# Patient Record
Sex: MALE | Race: WHITE | NOT HISPANIC OR LATINO | Employment: OTHER | URBAN - METROPOLITAN AREA
[De-identification: names, ages, dates, MRNs, and addresses within clinical notes are randomized per-mention and may not be internally consistent; named-entity substitution may affect disease eponyms.]

---

## 2017-01-05 ENCOUNTER — PATIENT MESSAGE (OUTPATIENT)
Dept: FAMILY MEDICINE | Facility: CLINIC | Age: 62
End: 2017-01-05

## 2017-01-05 RX ORDER — CODEINE PHOSPHATE AND GUAIFENESIN 10; 100 MG/5ML; MG/5ML
5 SOLUTION ORAL EVERY 6 HOURS PRN
Qty: 240 ML | Refills: 0 | Status: SHIPPED | OUTPATIENT
Start: 2017-01-05 | End: 2017-01-15

## 2017-03-11 ENCOUNTER — OFFICE VISIT (OUTPATIENT)
Dept: FAMILY MEDICINE | Facility: CLINIC | Age: 62
End: 2017-03-11
Payer: COMMERCIAL

## 2017-03-11 VITALS
TEMPERATURE: 98 F | OXYGEN SATURATION: 98 % | DIASTOLIC BLOOD PRESSURE: 88 MMHG | SYSTOLIC BLOOD PRESSURE: 136 MMHG | HEART RATE: 79 BPM | HEIGHT: 72 IN | WEIGHT: 227.06 LBS | BODY MASS INDEX: 30.75 KG/M2

## 2017-03-11 DIAGNOSIS — J06.9 UPPER RESPIRATORY TRACT INFECTION, UNSPECIFIED TYPE: Primary | ICD-10-CM

## 2017-03-11 PROCEDURE — 99213 OFFICE O/P EST LOW 20 MIN: CPT | Mod: S$GLB,,, | Performed by: FAMILY MEDICINE

## 2017-03-11 PROCEDURE — 99999 PR PBB SHADOW E&M-EST. PATIENT-LVL III: CPT | Mod: PBBFAC,,, | Performed by: FAMILY MEDICINE

## 2017-03-11 PROCEDURE — 1160F RVW MEDS BY RX/DR IN RCRD: CPT | Mod: S$GLB,,, | Performed by: FAMILY MEDICINE

## 2017-03-11 RX ORDER — MONTELUKAST SODIUM 10 MG/1
10 TABLET ORAL NIGHTLY
Qty: 90 TABLET | Refills: 4 | Status: SHIPPED | OUTPATIENT
Start: 2017-03-11 | End: 2018-09-13 | Stop reason: SDUPTHER

## 2017-03-11 NOTE — PROGRESS NOTES
Blayne Mendoza presents with moderate upper respiratory congestion,rhinnorhea,moderate cough past 2-3 days. OTC help slightly. Denies nausea,vomiting,diarrhea or significant fever.    Past Medical History:   Diagnosis Date    Allergy     Cancer     Prostate    Colon polyp     Elevated PSA      Past Surgical History:   Procedure Laterality Date    COLONOSCOPY N/A 12/1/2015    Procedure: COLONOSCOPY;  Surgeon: Rod Rayo MD;  Location: [facility];  Service: Endoscopy;  Laterality: N/A;    PROSTATE SURGERY  2008    radical prostatectomy    VASECTOMY       Review of patient's allergies indicates:   Allergen Reactions    Penicillins      Other reaction(s): Swelling    Sulfa (sulfonamide antibiotics)      Other reaction(s): Rash       Social History     Social History    Marital status:      Spouse name: N/A    Number of children: N/A    Years of education: N/A     Occupational History    Not on file.     Social History Main Topics    Smoking status: Former Smoker     Packs/day: 0.00     Types: Cigars     Quit date: 11/30/1982    Smokeless tobacco: Not on file    Alcohol use 7.0 oz/week     14 Standard drinks or equivalent per week    Drug use: No    Sexual activity: Yes     Partners: Female     Other Topics Concern    Not on file     Social History Narrative     No family history on file.      ROS:  SKIN: No rashes, itching or changes in color or texture of skin.  EYES: Visual acuity fine. No photophobia, ocular pain or diplopia.EARS: Denies ear pain, discharge or vertigo.NOSE: No loss of smell, no epistaxis some postnasal drip.MOUTH & THROAT: No hoarseness or change in voice. No excessive gum bleeding.CHEST: Denies SHAW, cyanosis, wheezing  CARDIOVASCULAR: Denies chest pain, PND, orthopnea or reduced exercise tolerance.  ABDOMEN:  No weight loss.No abdominal pain, no hematemesis or blood in stool.  URINARY: No flank pain, dysuria or hematuria.  PERIPHERAL VASCULAR: No claudication or  cyanosis.  MUSCULOSKELETAL: Negative   NEUROLOGIC: No history of seizures, paralysis, alteration of gait or coordination.    PE: Vital signs as noted  Heent:Normocephalic with no recent cranial trauma,PERRLA,EOMI,conjunctiva clear,fundi reveal no hemmorhage exudate or papilledema.Otic canals clear, tympanic membranes slightly dull bilaterally.Nasal mucosa slightly red and edematous.Posterior pharynx slightly red but without exudate.  Neck:Supple with minimal anterior cervical adenopathy.  Chest:Clear bilateral breath sounds with mild scattered ronchi  Heart:Regular rhthym without murmer  Abdomen:Soft, non tender,no masses, no hepatosplenomegalyExtremeties and Neurologic:Grossly within normal limits  Impression: Upper Respiratory Infection. 465.9  Plan:   Claritin/ Zantac Singulair

## 2017-05-18 ENCOUNTER — PATIENT MESSAGE (OUTPATIENT)
Dept: FAMILY MEDICINE | Facility: CLINIC | Age: 62
End: 2017-05-18

## 2017-05-18 ENCOUNTER — OFFICE VISIT (OUTPATIENT)
Dept: FAMILY MEDICINE | Facility: CLINIC | Age: 62
End: 2017-05-18
Payer: COMMERCIAL

## 2017-05-18 VITALS
HEIGHT: 72 IN | DIASTOLIC BLOOD PRESSURE: 76 MMHG | WEIGHT: 225.5 LBS | SYSTOLIC BLOOD PRESSURE: 133 MMHG | BODY MASS INDEX: 30.54 KG/M2 | HEART RATE: 70 BPM

## 2017-05-18 DIAGNOSIS — L30.9 DERMATITIS: Primary | ICD-10-CM

## 2017-05-18 PROCEDURE — 99213 OFFICE O/P EST LOW 20 MIN: CPT | Mod: S$GLB,,, | Performed by: FAMILY MEDICINE

## 2017-05-18 PROCEDURE — 1160F RVW MEDS BY RX/DR IN RCRD: CPT | Mod: S$GLB,,, | Performed by: FAMILY MEDICINE

## 2017-05-18 PROCEDURE — 99999 PR PBB SHADOW E&M-EST. PATIENT-LVL II: CPT | Mod: PBBFAC,,, | Performed by: FAMILY MEDICINE

## 2017-05-18 NOTE — PROGRESS NOTES
The patient presents with tender painful scalp for the past 3 months  but no fever.  ROS:  General: No fever or sig wt change  HEENT:No other PND eye pain or dc  Respiratory: No cough wheezing  PE: vital signs noted  HEENT: Normocephalic,with no recent trauma,PERRLA,EOMI,conjunctiva injected with no exudate.Nasopharynx is injected and edematous.External otic canal edematous and injected TM dull  Neck:Supple without adenopathy  Chest:Clear bilateral breath sounds    Heart:Regular rhthym without murmer  Abdomen:Soft, non tender,no masses, no hepatosplenomegaly  Extremeties and Neurologic:Grossly within normal limits     Impression:Scalp dermititis   Plan: tOP Steroid shampoo

## 2017-07-31 ENCOUNTER — PATIENT MESSAGE (OUTPATIENT)
Dept: FAMILY MEDICINE | Facility: CLINIC | Age: 62
End: 2017-07-31

## 2017-09-13 RX ORDER — METHYLPREDNISOLONE 4 MG/1
TABLET ORAL
Qty: 1 PACKAGE | Refills: 0 | Status: SHIPPED | OUTPATIENT
Start: 2017-09-13 | End: 2017-10-04

## 2017-09-13 RX ORDER — AZITHROMYCIN 250 MG/1
TABLET, FILM COATED ORAL
Qty: 6 TABLET | Refills: 0 | Status: SHIPPED | OUTPATIENT
Start: 2017-09-13 | End: 2017-12-21

## 2017-12-21 ENCOUNTER — OFFICE VISIT (OUTPATIENT)
Dept: FAMILY MEDICINE | Facility: CLINIC | Age: 62
End: 2017-12-21
Payer: COMMERCIAL

## 2017-12-21 VITALS
TEMPERATURE: 98 F | HEIGHT: 72 IN | SYSTOLIC BLOOD PRESSURE: 128 MMHG | BODY MASS INDEX: 30.93 KG/M2 | HEART RATE: 73 BPM | DIASTOLIC BLOOD PRESSURE: 77 MMHG | WEIGHT: 228.38 LBS

## 2017-12-21 DIAGNOSIS — J06.9 UPPER RESPIRATORY TRACT INFECTION, UNSPECIFIED TYPE: Primary | ICD-10-CM

## 2017-12-21 PROCEDURE — 99213 OFFICE O/P EST LOW 20 MIN: CPT | Mod: 25,S$GLB,, | Performed by: FAMILY MEDICINE

## 2017-12-21 PROCEDURE — 96372 THER/PROPH/DIAG INJ SC/IM: CPT | Mod: S$GLB,,, | Performed by: FAMILY MEDICINE

## 2017-12-21 PROCEDURE — 99999 PR PBB SHADOW E&M-EST. PATIENT-LVL III: CPT | Mod: PBBFAC,,, | Performed by: FAMILY MEDICINE

## 2017-12-21 RX ORDER — TACROLIMUS 0.3 MG/G
OINTMENT TOPICAL
COMMUNITY
Start: 2017-10-31 | End: 2018-05-03

## 2017-12-21 RX ORDER — SELENIUM SULFIDE 2.5 MG/100ML
LOTION TOPICAL
COMMUNITY
Start: 2017-10-31 | End: 2021-07-26

## 2017-12-21 RX ORDER — DOXYCYCLINE 100 MG/1
CAPSULE ORAL
COMMUNITY
Start: 2017-10-31 | End: 2018-05-03

## 2017-12-21 RX ORDER — DEXAMETHASONE SODIUM PHOSPHATE 4 MG/ML
8 INJECTION, SOLUTION INTRA-ARTICULAR; INTRALESIONAL; INTRAMUSCULAR; INTRAVENOUS; SOFT TISSUE ONCE
Status: COMPLETED | OUTPATIENT
Start: 2017-12-21 | End: 2017-12-21

## 2017-12-21 RX ORDER — CODEINE PHOSPHATE AND GUAIFENESIN 10; 100 MG/5ML; MG/5ML
5 SOLUTION ORAL EVERY 6 HOURS PRN
Qty: 240 ML | Refills: 0 | Status: SHIPPED | OUTPATIENT
Start: 2017-12-21 | End: 2017-12-31

## 2017-12-21 RX ORDER — HYDROXYZINE HYDROCHLORIDE 25 MG/1
TABLET, FILM COATED ORAL
COMMUNITY
Start: 2017-11-04 | End: 2019-01-09 | Stop reason: SDUPTHER

## 2017-12-21 RX ADMIN — DEXAMETHASONE SODIUM PHOSPHATE 8 MG: 4 INJECTION, SOLUTION INTRA-ARTICULAR; INTRALESIONAL; INTRAMUSCULAR; INTRAVENOUS; SOFT TISSUE at 11:12

## 2017-12-21 NOTE — PROGRESS NOTES
Blayne Mendoza presents with moderate upper respiratory congestion,rhinnorhea,moderate cough past 2-3 days. OTC help slightly. Denies nausea,vomiting,diarrhea or significant fever.    Past Medical History:   Diagnosis Date    Allergy     Cancer     Prostate    Colon polyp     Elevated PSA      Past Surgical History:   Procedure Laterality Date    COLONOSCOPY N/A 12/1/2015    Procedure: COLONOSCOPY;  Surgeon: Rod Rayo MD;  Location: [facility];  Service: Endoscopy;  Laterality: N/A;    PROSTATE SURGERY  2008    radical prostatectomy    VASECTOMY       Review of patient's allergies indicates:   Allergen Reactions    Penicillins      Other reaction(s): Swelling    Sulfa (sulfonamide antibiotics)      Other reaction(s): Rash       Social History     Social History    Marital status:      Spouse name: N/A    Number of children: N/A    Years of education: N/A     Occupational History    Not on file.     Social History Main Topics    Smoking status: Former Smoker     Packs/day: 0.00     Types: Cigars     Quit date: 11/30/1982    Smokeless tobacco: Not on file    Alcohol use 7.0 oz/week     14 Standard drinks or equivalent per week    Drug use: No    Sexual activity: Yes     Partners: Female     Other Topics Concern    Not on file     Social History Narrative    No narrative on file     History reviewed. No pertinent family history.      ROS:  SKIN: No rashes, itching or changes in color or texture of skin.  EYES: Visual acuity fine. No photophobia, ocular pain or diplopia.EARS: Denies ear pain, discharge or vertigo.NOSE: No loss of smell, no epistaxis some postnasal drip.MOUTH & THROAT: No hoarseness or change in voice. No excessive gum bleeding.CHEST: Denies SHAW, cyanosis, wheezing  CARDIOVASCULAR: Denies chest pain, PND, orthopnea or reduced exercise tolerance.  ABDOMEN:  No weight loss.No abdominal pain, no hematemesis or blood in stool.  URINARY: No flank pain, dysuria or  hematuria.  PERIPHERAL VASCULAR: No claudication or cyanosis.  MUSCULOSKELETAL: Negative   NEUROLOGIC: No history of seizures, paralysis, alteration of gait or coordination.    PE: Vital signs as noted  Heent:Normocephalic with no recent cranial trauma,PERRLA,EOMI,conjunctiva clear,fundi reveal no hemmorhage exudate or papilledema.Otic canals clear, tympanic membranes slightly dull bilaterally.Nasal mucosa slightly red and edematous.Posterior pharynx slightly red but without exudate.  Neck:Supple with minimal anterior cervical adenopathy.  Chest:Clear bilateral breath sounds with mild scattered ronchi  Heart:Regular rhthym without murmer  Abdomen:Soft, non tender,no masses, no hepatosplenomegalyExtremeties and Neurologic:Grossly within normal limits  Impression: Upper Respiratory Infection. 465.9  Plan: Doxy   Dexa 8

## 2018-02-07 ENCOUNTER — PATIENT MESSAGE (OUTPATIENT)
Dept: FAMILY MEDICINE | Facility: CLINIC | Age: 63
End: 2018-02-07

## 2018-05-03 ENCOUNTER — OFFICE VISIT (OUTPATIENT)
Dept: FAMILY MEDICINE | Facility: CLINIC | Age: 63
End: 2018-05-03
Payer: COMMERCIAL

## 2018-05-03 VITALS
SYSTOLIC BLOOD PRESSURE: 118 MMHG | HEART RATE: 78 BPM | WEIGHT: 228 LBS | BODY MASS INDEX: 30.92 KG/M2 | DIASTOLIC BLOOD PRESSURE: 77 MMHG

## 2018-05-03 DIAGNOSIS — J06.9 UPPER RESPIRATORY TRACT INFECTION, UNSPECIFIED TYPE: Primary | ICD-10-CM

## 2018-05-03 DIAGNOSIS — H10.9 CONJUNCTIVITIS, UNSPECIFIED CONJUNCTIVITIS TYPE, UNSPECIFIED LATERALITY: ICD-10-CM

## 2018-05-03 DIAGNOSIS — S61.412A LACERATION OF LEFT HAND WITHOUT FOREIGN BODY, INITIAL ENCOUNTER: ICD-10-CM

## 2018-05-03 PROCEDURE — 99214 OFFICE O/P EST MOD 30 MIN: CPT | Mod: S$GLB,,, | Performed by: FAMILY MEDICINE

## 2018-05-03 PROCEDURE — 3008F BODY MASS INDEX DOCD: CPT | Mod: CPTII,S$GLB,, | Performed by: FAMILY MEDICINE

## 2018-05-03 PROCEDURE — 99999 PR PBB SHADOW E&M-EST. PATIENT-LVL II: CPT | Mod: PBBFAC,,, | Performed by: FAMILY MEDICINE

## 2018-05-03 RX ORDER — TOBRAMYCIN AND DEXAMETHASONE 3; 1 MG/ML; MG/ML
1 SUSPENSION/ DROPS OPHTHALMIC EVERY 6 HOURS
Qty: 5 ML | Refills: 1 | Status: SHIPPED | OUTPATIENT
Start: 2018-05-03 | End: 2018-05-10 | Stop reason: SDUPTHER

## 2018-05-03 NOTE — PROGRESS NOTES
Blayne Mendoza presents co lac finger 1h ago. Also with moderate eye redness and dc wo  upper respiratory congestion,rhinnorhea,moderate cough past 2-3 days Denies nausea,vomiting,diarrhea or significant fever.    Past Medical History:   Diagnosis Date    Allergy     Cancer     Prostate    Colon polyp     Elevated PSA      Past Surgical History:   Procedure Laterality Date    COLONOSCOPY N/A 12/1/2015    Procedure: COLONOSCOPY;  Surgeon: Rod Rayo MD;  Location: [facility];  Service: Endoscopy;  Laterality: N/A;    PROSTATE SURGERY  2008    radical prostatectomy    VASECTOMY       Review of patient's allergies indicates:   Allergen Reactions    Penicillins      Other reaction(s): Swelling    Sulfa (sulfonamide antibiotics)      Other reaction(s): Rash       Social History     Social History    Marital status:      Spouse name: N/A    Number of children: N/A    Years of education: N/A     Occupational History    Not on file.     Social History Main Topics    Smoking status: Former Smoker     Packs/day: 0.00     Types: Cigars     Quit date: 11/30/1982    Smokeless tobacco: Not on file    Alcohol use 7.0 oz/week     14 Standard drinks or equivalent per week    Drug use: No    Sexual activity: Yes     Partners: Female     Other Topics Concern    Not on file     Social History Narrative    No narrative on file     History reviewed. No pertinent family history.      ROS:  SKIN: No rashes, itching or changes in color or texture of skin.  EYES: Visual acuity fine. No photophobia, ocular pain or diplopia.EARS: Denies ear pain, discharge or vertigo.NOSE: No loss of smell, no epistaxis some postnasal drip.MOUTH & THROAT: No hoarseness or change in voice. No excessive gum bleeding.CHEST: Denies SHAW, cyanosis, wheezing  CARDIOVASCULAR: Denies chest pain, PND, orthopnea or reduced exercise tolerance.  ABDOMEN:  No weight loss.No abdominal pain, no hematemesis or blood in stool.  URINARY: No  flank pain, dysuria or hematuria.  PERIPHERAL VASCULAR: No claudication or cyanosis.  MUSCULOSKELETAL: Negative   NEUROLOGIC: No history of seizures, paralysis, alteration of gait or coordination.  1.5 cm lac dorsal hand prox to pip index repaired 4-0 ethilon p sterile prep   PE: Vital signs as noted  Heent:Normocephalic with no recent cranial trauma,PERRLA,EOMI,conjunctiva clear,fundi reveal no hemmorhage exudate or papilledema.Otic canals clear, tympanic membranes slightly dull bilaterally.Nasal mucosa slightly red and edematous.Posterior pharynx slightly red but without exudate.  Neck:Supple with minimal anterior cervical adenopathy.  Chest:Clear bilateral breath sounds with mild scattered ronchi  Heart:Regular rhthym without murmer  Abdomen:Soft, non tender,no masses, no hepatosplenomegalyExtremeties and Neurologic:Grossly within normal limits  Impression: Upper Respiratory Infection. 465.9  Laceration  Plan: Tobradex opth gtts   Wound care and sym fu

## 2018-05-10 RX ORDER — TOBRAMYCIN AND DEXAMETHASONE 3; 1 MG/ML; MG/ML
SUSPENSION/ DROPS OPHTHALMIC
Qty: 5 ML | Refills: 1 | Status: SHIPPED | OUTPATIENT
Start: 2018-05-10 | End: 2019-01-09

## 2018-06-27 ENCOUNTER — LAB VISIT (OUTPATIENT)
Dept: LAB | Facility: HOSPITAL | Age: 63
End: 2018-06-27
Attending: FAMILY MEDICINE
Payer: COMMERCIAL

## 2018-06-27 ENCOUNTER — PATIENT MESSAGE (OUTPATIENT)
Dept: FAMILY MEDICINE | Facility: CLINIC | Age: 63
End: 2018-06-27

## 2018-06-27 DIAGNOSIS — N39.0 UTI (URINARY TRACT INFECTION), UNCOMPLICATED: Primary | ICD-10-CM

## 2018-06-27 DIAGNOSIS — R30.0 DYSURIA: Primary | ICD-10-CM

## 2018-06-27 DIAGNOSIS — R30.0 DYSURIA: ICD-10-CM

## 2018-06-27 LAB
BACTERIA #/AREA URNS HPF: ABNORMAL /HPF
BILIRUB UR QL STRIP: NEGATIVE
CLARITY UR: CLEAR
COLOR UR: YELLOW
GLUCOSE UR QL STRIP: NEGATIVE
HGB UR QL STRIP: ABNORMAL
HYALINE CASTS #/AREA URNS LPF: 0 /LPF
KETONES UR QL STRIP: NEGATIVE
LEUKOCYTE ESTERASE UR QL STRIP: ABNORMAL
MICROSCOPIC COMMENT: ABNORMAL
NITRITE UR QL STRIP: POSITIVE
PH UR STRIP: 6 [PH] (ref 5–8)
PROT UR QL STRIP: ABNORMAL
RBC #/AREA URNS HPF: >100 /HPF (ref 0–4)
SP GR UR STRIP: 1.02 (ref 1–1.03)
SQUAMOUS #/AREA URNS HPF: ABNORMAL /HPF
URN SPEC COLLECT METH UR: ABNORMAL
WBC #/AREA URNS HPF: >100 /HPF (ref 0–5)
WBC CLUMPS URNS QL MICRO: ABNORMAL

## 2018-06-27 PROCEDURE — 81000 URINALYSIS NONAUTO W/SCOPE: CPT | Mod: PO

## 2018-06-27 RX ORDER — CIPROFLOXACIN 500 MG/1
500 TABLET ORAL 2 TIMES DAILY
Qty: 14 TABLET | Refills: 0 | Status: SHIPPED | OUTPATIENT
Start: 2018-06-27 | End: 2018-08-20

## 2018-07-23 ENCOUNTER — PATIENT MESSAGE (OUTPATIENT)
Dept: FAMILY MEDICINE | Facility: CLINIC | Age: 63
End: 2018-07-23

## 2018-07-23 ENCOUNTER — TELEPHONE (OUTPATIENT)
Dept: FAMILY MEDICINE | Facility: CLINIC | Age: 63
End: 2018-07-23

## 2018-07-23 DIAGNOSIS — J30.89 NON-SEASONAL ALLERGIC RHINITIS, UNSPECIFIED TRIGGER: ICD-10-CM

## 2018-07-23 DIAGNOSIS — H91.90 HEARING LOSS, UNSPECIFIED HEARING LOSS TYPE, UNSPECIFIED LATERALITY: Primary | ICD-10-CM

## 2018-07-23 DIAGNOSIS — N52.8 OTHER MALE ERECTILE DYSFUNCTION: Primary | ICD-10-CM

## 2018-07-25 ENCOUNTER — TELEPHONE (OUTPATIENT)
Dept: UROLOGY | Facility: CLINIC | Age: 63
End: 2018-07-25

## 2018-08-08 ENCOUNTER — PATIENT MESSAGE (OUTPATIENT)
Dept: FAMILY MEDICINE | Facility: CLINIC | Age: 63
End: 2018-08-08

## 2018-08-09 ENCOUNTER — CLINICAL SUPPORT (OUTPATIENT)
Dept: AUDIOLOGY | Facility: CLINIC | Age: 63
End: 2018-08-09
Payer: COMMERCIAL

## 2018-08-09 ENCOUNTER — OFFICE VISIT (OUTPATIENT)
Dept: OTOLARYNGOLOGY | Facility: CLINIC | Age: 63
End: 2018-08-09
Payer: COMMERCIAL

## 2018-08-09 VITALS — WEIGHT: 224.44 LBS | BODY MASS INDEX: 30.4 KG/M2 | HEIGHT: 72 IN

## 2018-08-09 DIAGNOSIS — H90.3 BILATERAL SENSORINEURAL HEARING LOSS: Primary | ICD-10-CM

## 2018-08-09 DIAGNOSIS — H90.3 SENSORINEURAL HEARING LOSS (SNHL), BILATERAL: ICD-10-CM

## 2018-08-09 DIAGNOSIS — H91.90 HEARING DIFFICULTY, UNSPECIFIED LATERALITY: Primary | ICD-10-CM

## 2018-08-09 DIAGNOSIS — H69.91 ETD (EUSTACHIAN TUBE DYSFUNCTION), RIGHT: Primary | ICD-10-CM

## 2018-08-09 PROCEDURE — 92557 COMPREHENSIVE HEARING TEST: CPT | Mod: S$GLB,,, | Performed by: AUDIOLOGIST-HEARING AID FITTER

## 2018-08-09 PROCEDURE — 99243 OFF/OP CNSLTJ NEW/EST LOW 30: CPT | Mod: S$GLB,,, | Performed by: OTOLARYNGOLOGY

## 2018-08-09 PROCEDURE — 99999 PR PBB SHADOW E&M-EST. PATIENT-LVL II: CPT | Mod: PBBFAC,,, | Performed by: OTOLARYNGOLOGY

## 2018-08-09 PROCEDURE — 99999 PR PBB SHADOW E&M-EST. PATIENT-LVL I: CPT | Mod: PBBFAC,,,

## 2018-08-09 PROCEDURE — 92567 TYMPANOMETRY: CPT | Mod: S$GLB,,, | Performed by: AUDIOLOGIST-HEARING AID FITTER

## 2018-08-09 NOTE — PROGRESS NOTES
Blaynemegan Mendoza was seen 08/09/2018 for an audiological evaluation. Patient complains of hearing loss. Pt reports a strong Hx of noise exposure with firearms and loud music. He denies tinnitus and family Hx of hearing loss. His family and friends are complaining that he can't hear well. He has noticed that his hearing is particularly bad in the presence of background noise. He actually stated that he does not participate in conversation at a restaurant because it is too difficult for him. He is friends with Dr Christopher Méndez who has told him he needs a hearing test and he also noticed in a recent meeting with Paolo Cordova and Rep Karlo Jose that he repeatedly asked for repetition.     Results reveal a normal through 750Hz sloping to profound sensorineural hearing loss for the right ear, and  Normal through 750Hz sloping to severe sensorineural hearing loss for the left ear.    Speech Reception Thresholds were  25 dBHL for the right ear and 20 dBHL for the left ear.  Pt had poor PTA/SRT agreement despite reinstruction.  Word recognition scores were good for the right ear and fair for the left ear.   Tympanograms were Type A for the right ear and Type A for the left ear.    Audiogram results were reviewed in detail with patient and all questions were answered. Results will be reviewed by ENT at the completion of this note. Recommend binaural amplification pending medical clearance, annual hearing tests to monitor hearing and hearing protection in loud noise. Pt is interested in hearing aids and will call the clinic when he is ready to set up a hearing aid consult.

## 2018-08-09 NOTE — LETTER
August 9, 2018      Christopher Méndez MD  [id number] [facility] [address]           [place] [facility]  [address] [facility] [address] [place] [address]  Phone: [phone]  Fax: [phone]          Patient: Blayne Mendoza   MR Number: [id number]   YOB: 1955   Date of Visit: 8/9/2018       Dear Aaareferral Self:    Thank you for referring Blayne Mendoza to me for evaluation. Attached you will find relevant portions of my assessment and plan of care.    If you have questions, please do not hesitate to call me. I look forward to following Blayne Mendoza along with you.    Sincerely,    Raj Coughlin MD    Enclosure  CC:  No Recipients    If you would like to receive this communication electronically, please contact [email or web address] or [phone] to request more information on [employer] access.    For providers and/or their staff who would like to refer a patient to [facility], please contact us through our one-stop-shop provider referral line, [facility], at [phone].    If you feel you have received this communication in error or would no longer like to receive these types of communications, please e-mail [email or web address]

## 2018-08-09 NOTE — PROGRESS NOTES
Subjective:       Patient ID: Blayne Mendoza is a 62 y.o. male.    Chief Complaint: Hearing Loss and Ear Fullness    Blayne is here for ear issues. Right sided symptoms more than left.   Two issues: hearing loss and aural fullness. He feels increasing aural fullness over time and issues with pressure in the ears which he believe cause some intermittent hearing issues. He does have history of allergies but have been reasonably controlled. Symptoms have been slowly progressive. He does notice that when he swallows of insufflates his ears, he has improvement in hearing.   He has trouble in background noise. He is a busy  and finds himself asking people to repeat themselves often.  Denies fam history of hearing loss, but he does have noise exposure history.       Previous surgery: no ear surgrery, no vertigo.     History   Smoking Status    Former Smoker    Packs/day: 0.00    Types: Cigars    Quit date: 11/30/1982   Smokeless Tobacco    Not on file     History   Alcohol Use    7.0 oz/week    14 Standard drinks or equivalent per week          Review of Systems   Constitutional: Negative for activity change and appetite change.   Eyes: Negative for discharge.   Respiratory: Negative for difficulty breathing and wheezing   Cardiovascular: Negative for chest pain.   Gastrointestinal: Negative for abdominal distention and abdominal pain.   Endocrine: Negative for cold intolerance and heat intolerance.   Genitourinary: Negative for dysuria.   Musculoskeletal: Negative for gait problem and joint swelling.   Skin: Negative for color change and pallor.   Neurological: Negative for syncope and weakness.   Psychiatric/Behavioral: Negative for agitation and confusion.     Objective:        Constitutional:   He is oriented to person, place, and time. He appears well-developed and well-nourished. He appears alert. He is active. Normal speech.      Head:  Normocephalic and atraumatic. Head is without TMJ  tenderness. No scars. Salivary glands normal.  Facial strength is normal.      Ears:    Right Ear: No drainage or swelling. No middle ear effusion.   Left Ear: No drainage or swelling.  No middle ear effusion.     Nose:  Mucosal edema and rhinorrhea (mucoid, thin) present. No sinus tenderness. Turbinate hypertrophy.      Mouth/Throat  Oropharynx clear and moist without lesions or asymmetry, normal uvula midline and mirror exam normal. Normal dentition. No uvula swelling, lacerations or trismus. No oropharyngeal exudate. Tonsillar erythema, tonsillar exudate.      Neck:  Full range of motion with neck supple and no adenopathy. Thyroid tenderness is present. No tracheal deviation, no edema, no erythema, normal range of motion, no stridor, no crepitus and no neck rigidity present. No thyroid mass present.     Cardiovascular:   Intact distal pulses and normal pulses.      Pulmonary/Chest:   Effort normal and breath sounds normal. No stridor.     Psychiatric:   His speech is normal and behavior is normal. His mood appears not anxious. His affect is not labile.     Neurological:   He is alert and oriented to person, place, and time. No sensory deficit.     Skin:   No abrasions, lacerations, lesions, or rashes. No abrasion and no bruising noted.         Tests / Results:  Normal sloping to severe SNHL  Fair to good WRS            Assessment:       1. ETD (Eustachian tube dysfunction), right    2. Sensorineural hearing loss (SNHL), bilateral          Plan:         Suspect hearing loss (SN) related to majority of hearing issues. He is a candidate for hearing aid. Discused limitations regarding his WRS but I think he would do well overall.  His tympanograms and exam are normal, but I supect ETD, right. Discussed medical treatment first (INS and oral AH) given his intermittent allergic issues. I doubt a tube would help but depending on how he does, we can discuss  FU 4-6 weeks.

## 2018-08-20 ENCOUNTER — OFFICE VISIT (OUTPATIENT)
Dept: UROLOGY | Facility: CLINIC | Age: 63
End: 2018-08-20
Payer: COMMERCIAL

## 2018-08-20 ENCOUNTER — PATIENT MESSAGE (OUTPATIENT)
Dept: UROLOGY | Facility: CLINIC | Age: 63
End: 2018-08-20

## 2018-08-20 VITALS
SYSTOLIC BLOOD PRESSURE: 158 MMHG | HEART RATE: 76 BPM | HEIGHT: 72 IN | DIASTOLIC BLOOD PRESSURE: 85 MMHG | WEIGHT: 218.25 LBS | BODY MASS INDEX: 29.56 KG/M2

## 2018-08-20 DIAGNOSIS — N52.31 ERECTILE DYSFUNCTION FOLLOWING RADICAL PROSTATECTOMY: Primary | ICD-10-CM

## 2018-08-20 DIAGNOSIS — C61 PROSTATE CANCER: ICD-10-CM

## 2018-08-20 PROBLEM — N13.8 BPH WITH URINARY OBSTRUCTION: Status: ACTIVE | Noted: 2018-08-20

## 2018-08-20 PROBLEM — N40.1 BPH WITH URINARY OBSTRUCTION: Status: RESOLVED | Noted: 2018-08-20 | Resolved: 2018-08-20

## 2018-08-20 PROBLEM — N52.01 ERECTILE DYSFUNCTION DUE TO ARTERIAL INSUFFICIENCY: Status: RESOLVED | Noted: 2018-08-20 | Resolved: 2018-08-20

## 2018-08-20 PROBLEM — N13.8 BPH WITH URINARY OBSTRUCTION: Status: RESOLVED | Noted: 2018-08-20 | Resolved: 2018-08-20

## 2018-08-20 PROBLEM — N40.1 BPH WITH URINARY OBSTRUCTION: Status: ACTIVE | Noted: 2018-08-20

## 2018-08-20 PROBLEM — N52.01 ERECTILE DYSFUNCTION DUE TO ARTERIAL INSUFFICIENCY: Status: ACTIVE | Noted: 2018-08-20

## 2018-08-20 PROCEDURE — 99204 OFFICE O/P NEW MOD 45 MIN: CPT | Mod: S$GLB,,, | Performed by: UROLOGY

## 2018-08-20 PROCEDURE — 99999 PR PBB SHADOW E&M-EST. PATIENT-LVL III: CPT | Mod: PBBFAC,,, | Performed by: UROLOGY

## 2018-08-20 PROCEDURE — 3008F BODY MASS INDEX DOCD: CPT | Mod: CPTII,S$GLB,, | Performed by: UROLOGY

## 2018-08-20 RX ORDER — SILDENAFIL CITRATE 20 MG/1
TABLET ORAL
Qty: 50 TABLET | Refills: 11 | Status: SHIPPED | OUTPATIENT
Start: 2018-08-20 | End: 2018-08-24 | Stop reason: SDUPTHER

## 2018-08-20 RX ORDER — PAPAVERINE HYDROCHLORIDE 30 MG/ML
INJECTION INTRAMUSCULAR; INTRAVENOUS
Qty: 5 ML | Refills: 0 | Status: SHIPPED | OUTPATIENT
Start: 2018-08-20 | End: 2018-08-24 | Stop reason: ALTCHOICE

## 2018-08-20 NOTE — PROGRESS NOTES
CHIEF COMPLAINT:    Mr. Mendoza is a 62 y.o. male presenting with ED.    PRESENTING ILLNESS:    Blayne Mendoza is a 62 y.o. male with a history of prostate cancer s/p RALP ~  by Dr. Torres.  He was able to have return of his erections.  However, he notes that > 6 months ago, he started having trouble with his erections.  After his RALP, he used ICI with good results.  He can't remember his dose.    He is continent.      No bone pain or weight loss.    REVIEW OF SYSTEMS:    Blayne Mendoza denies headache, blurred vision, fever, nausea, vomiting, chills, abdominal pain, bleeding per rectum, cough, SOB, recent loss of consciousness, recent mental status changes, seizures, dizziness, or upper or lower extremity weakness.    GILLIAN  1. 1  2. 2  3. 1  4. 2  5. 1      PATIENT HISTORY:    Past Medical History:   Diagnosis Date    Allergy     Cancer     Prostate    Colon polyp        Past Surgical History:   Procedure Laterality Date    PROSTATE SURGERY      radical prostatectomy    VASECTOMY         History reviewed. No pertinent family history.    Social History     Socioeconomic History    Marital status:      Spouse name: Not on file    Number of children: Not on file    Years of education: Not on file    Highest education level: Not on file   Social Needs    Financial resource strain: Not on file    Food insecurity - worry: Not on file    Food insecurity - inability: Not on file    Transportation needs - medical: Not on file    Transportation needs - non-medical: Not on file   Occupational History    Not on file   Tobacco Use    Smoking status: Former Smoker     Packs/day: 0.00     Types: Cigars     Last attempt to quit: 1982     Years since quittin.7    Smokeless tobacco: Never Used   Substance and Sexual Activity    Alcohol use: Yes     Alcohol/week: 7.0 oz     Types: 14 Standard drinks or equivalent per week    Drug use: No    Sexual activity: Yes     Partners: Female    Other Topics Concern    Not on file   Social History Narrative    Not on file       Allergies:  Penicillins and Sulfa (sulfonamide antibiotics)    Medications:    Current Outpatient Medications:     alprazolam (XANAX) 0.25 MG tablet, TAKE 1 TO 2 TABLETS BY MOUTH EVERY 4 HOURS AS NEEDED  MAY CAUSEDROWSINESS , Disp: 60 tablet, Rfl: 2    fluocinolone (SYNALAR) 0.01 % Sham, Apply topically once daily at 6am., Disp: 120 mL, Rfl: 3    hydrOXYzine HCl (ATARAX) 25 MG tablet, Take 1/2 to 1 tablet for itching as needed., Disp: , Rfl:     selenium sulfide 2.5 % Lotn, Shampoo lather 3-5 mins nightly for itching., Disp: , Rfl:     tobramycin-dexamethasone 0.3-0.1% (TOBRADEX) 0.3-0.1 % DrpS, INSTILL ONE DROP INTO BOTH EYES EVERY 6 HOURS SHAKE GENTLY BEFORE USE, Disp: 5 mL, Rfl: 1    PHYSICAL EXAMINATION:    The patient generally appears in good health, is appropriately interactive, and is in no apparent distress.     Eyes: anicteric sclerae, moist conjunctivae; no lid-lag; PERRLA     HENT: Atraumatic; oropharynx clear with moist mucous membranes and no mucosal ulcerations;normal hard and soft palate.  No evidence of lymphadenopathy.    Neck: Trachea midline.  No thyromegaly.    Musculoskeletal: No abnormal gait.    Skin: No lesions.    Mental: Cooperative with normal affect.  Is oriented to time, place, and person.    Neuro: Grossly intact.    Chest: Normal inspiratory effort.   No accessory muscles.  No audible wheezes.  Respirations symmetric on inspiration and expiration.    Heart: Regular rhythm.      Abdomen:  Soft, non-tender. No masses or organomegaly. Bladder is not palpable. No evidence of flank discomfort. No evidence of inguinal hernia.    Genitourinary: The penis is not circumcised with no evidence of plaques or induration. The urethral meatus is normal. The testes, epididymides, and cord structures are normal in size and contour bilaterally. The scrotum is normal in size and contour.    Extremities: No  clubbing, cyanosis, or edema      LABS:      Lab Results   Component Value Date    PSA 0.04 12/15/2015    PSA 0.02 12/09/2014    PSA 0.02 01/03/2013       IMPRESSION:    Encounter Diagnoses   Name Primary?    Erectile dysfunction following radical prostatectomy Yes    Prostate cancer          PLAN:    1. Discussed options for his ED.  He'd like to try sildenafil and ICI. Side effects discussed.  A new Rx was given.  Will set up for teaching.  2. Will check a PSA today.  3. RTC 3 months.    Copy to:

## 2018-08-20 NOTE — PATIENT INSTRUCTIONS
Penile Self-Injection Procedure  Self-injection is a good option for many men with erectile dysfunction (ED). A tiny needle is used to inject medication into the penis. This helps your penis get hard and stay that way long enough for sex. And sex and orgasm will feel as good as always. You may be nervous about doing self-injection at first. But with practice, it will get easier. Your healthcare provider will show you how to do self-injection the first time. The simple steps are outlined on this sheet.  Preparing for Injection  · Wash your hands well with soap and water.  · Prepare the medication (if needed).  · Sit or  a comfortable position in a warm, well-lit room. If you need to, sit or  front of a mirror.  · Find an injection site on one side of your penis, in a place with no visible veins. (Dont inject into the top, bottom, or head of the penis.)  · Clean the injection site with an alcohol swab. Grasp the head of your penis firmly with your thumb and forefinger (dont just pinch the skin). Stretch the penis so the skin on the shaft is taut.  Injecting the Medication  · Rest your penis against your inner thigh and pull it gently toward your knee. Dont twist or rotate it. This way youll be sure to inject the medication into the spot you chose and cleaned before.  · Hold the syringe between your thumb and fingers, like youre holding a pen. Rest your forearm on your thigh for support.  · Insert the needle at a 90-degree angle (perpendicular) to the shaft. Do this quickly to reduce discomfort. (The needle should go in easily. If it doesnt, stop right away.)  · Move your thumb to the plunger. Press down to inject the medication, counting to 5.  · Remove the needle and dispose of it safely.     The injection site can be in any part of the shaded area.     Insert the needle straight into the penis.    Gaining an Erection  · Apply pressure to the injection site for a few minutes. This prevents  swelling and bruising and helps spread the medication.   · Stand up. This may help your erection develop. Foreplay often helps, too.  · Your penis should become firm within 10-20 minutes. The erection will last long enough for sex, and maybe longer.  Call Your Doctor If You Have:   · An erection that lasts longer than 3-4  hours  · Bleeding or bruising  · Severe pain  · Scarring or curvature of the penis       © 4227-7464 [facility], [address] [place] [address], [place] [address]. All rights reserved. This information is not intended as a substitute for professional medical care. Always follow your healthcare professional's instructions.

## 2018-08-20 NOTE — LETTER
August 20, 2018      Christopher Méndez MD  [address] [facility] [address]           [facility] - Urology 4th Floor  [address]  [place] [address]  Phone: [phone]          Patient: Blayne Mendoza   MR Number: [id number]   YOB: 1955   Date of Visit: 8/20/2018       Dear Dr. Christopher Méndez:    Thank you for referring Blayne Mendoza to me for evaluation. Attached you will find relevant portions of my assessment and plan of care.    If you have questions, please do not hesitate to call me. I look forward to following Blayne Mendoza along with you.    Sincerely,    Kal Sprague MD    Enclosure  CC:  No Recipients    If you would like to receive this communication electronically, please contact [email or web address] or [phone] to request more information on [employer] Link access.    For providers and/or their staff who would like to refer a patient to [facility], please contact us through our one-stop-shop provider referral line, [facility], at [phone].    If you feel you have received this communication in error or would no longer like to receive these types of communications, please e-mail [email or web address]

## 2018-08-24 ENCOUNTER — PATIENT MESSAGE (OUTPATIENT)
Dept: UROLOGY | Facility: CLINIC | Age: 63
End: 2018-08-24

## 2018-08-24 ENCOUNTER — OFFICE VISIT (OUTPATIENT)
Dept: UROLOGY | Facility: CLINIC | Age: 63
End: 2018-08-24
Payer: COMMERCIAL

## 2018-08-24 VITALS
DIASTOLIC BLOOD PRESSURE: 71 MMHG | HEART RATE: 62 BPM | SYSTOLIC BLOOD PRESSURE: 137 MMHG | BODY MASS INDEX: 29.6 KG/M2 | WEIGHT: 218.25 LBS

## 2018-08-24 DIAGNOSIS — N52.31 ERECTILE DYSFUNCTION FOLLOWING RADICAL PROSTATECTOMY: ICD-10-CM

## 2018-08-24 DIAGNOSIS — C61 PROSTATE CANCER: Primary | ICD-10-CM

## 2018-08-24 DIAGNOSIS — Z90.79 HISTORY OF RADICAL PROSTATECTOMY: ICD-10-CM

## 2018-08-24 PROCEDURE — 3008F BODY MASS INDEX DOCD: CPT | Mod: CPTII,S$GLB,, | Performed by: NURSE PRACTITIONER

## 2018-08-24 PROCEDURE — 99999 PR PBB SHADOW E&M-EST. PATIENT-LVL III: CPT | Mod: PBBFAC,,, | Performed by: NURSE PRACTITIONER

## 2018-08-24 PROCEDURE — 99215 OFFICE O/P EST HI 40 MIN: CPT | Mod: S$GLB,,, | Performed by: NURSE PRACTITIONER

## 2018-08-24 RX ORDER — SILDENAFIL CITRATE 20 MG/1
TABLET ORAL
Qty: 90 TABLET | Refills: 11 | Status: ON HOLD | OUTPATIENT
Start: 2018-08-24 | End: 2020-06-25 | Stop reason: HOSPADM

## 2018-08-24 RX ORDER — PAPAVERINE HYDROCHLORIDE 30 MG/ML
INJECTION INTRAMUSCULAR; INTRAVENOUS
Qty: 10 ML | Refills: 11 | Status: SHIPPED | OUTPATIENT
Start: 2018-08-24 | End: 2021-07-26

## 2018-08-24 RX ORDER — SYRINGE,SAFETY WITH NEEDLE,1ML 25GX1"
SYRINGE (EA) MISCELLANEOUS
Qty: 10 EACH | Refills: 12 | Status: SHIPPED | OUTPATIENT
Start: 2018-08-24 | End: 2021-07-26

## 2018-08-24 NOTE — PROGRESS NOTES
Subjective:       Patient ID: Blayne Mendoza is a 62 y.o. male.    Chief Complaint: Erectile Dysfunction (PEP)    Blayne Mendoza is a 62 y.o. male with a history of prostate cancer s/p RALP ~  by Dr. Torres.    He was able to have return of his erections.  However, he notes that > 6 months ago, he started having trouble with his erections.    After his RALP, he used ICI with good results.  He can't remember his dose.  Testosterone is normal (508 ).    He was last seen in clinic with Dr. Sprague 2018.  He tried the Revatio but got headache and only slight reaction.    Here today for ICI education and 1st injection  He brought in his own medication                   PSA                  0.03                2018                 He is continent.       No bone pain or weight loss.        Past Medical History:  No date: Allergy  No date: Cancer      Comment:  Prostate  No date: Colon polyp    Past Surgical History:  2008: PROSTATE SURGERY      Comment:  radical prostatectomy  No date: VASECTOMY    History reviewed.  No pertinent family history.      Social History    Socioeconomic History      Marital status:       Spouse name: Not on file      Number of children: Not on file      Years of education: Not on file      Highest education level: Not on file    Social Needs      Financial resource strain: Not on file      Food insecurity - worry: Not on file      Food insecurity - inability: Not on file      Transportation needs - medical: Not on file      Transportation needs - non-medical: Not on file    Occupational History      Not on file    Tobacco Use      Smoking status: Former Smoker        Packs/day: 0.00        Types: Cigars        Quit date: 1982        Years since quittin.7      Smokeless tobacco: Never Used    Substance and Sexual Activity      Alcohol use: Yes        Alcohol/week: 7.0 oz        Types: 14 Standard drinks or equivalent per week      Drug use: No      Sexual  activity: Yes        Partners: Female    Other Topics      Concerns:        Not on file    Social History Narrative      Not on file      Allergies:  Penicillins and Sulfa (sulfonamide antibiotics)    Medications:  Current Outpatient Medications:   fluocinolone (SYNALAR) 0.01 % Sham, Apply topically once daily at 6am., Disp: 120 mL, Rfl: 3  hydrOXYzine HCl (ATARAX) 25 MG tablet, Take 1/2 to 1 tablet for itching as needed., Disp: , Rfl:   papaverine 30 mg/mL injection, Add Phentolamine 1 mg/cc Add PGE1 10 mcg/cc  SIG: Bring to office for test dose in physician office, Disp: 5 mL, Rfl: 0  selenium sulfide 2.5 % Lotn, Shampoo lather 3-5 mins nightly for itching., Disp: , Rfl:   sildenafil (REVATIO) 20 mg Tab, Take 5 tablets by mouth 1 hour before sexual activity, Disp: 50 tablet, Rfl: 11  tobramycin-dexamethasone 0.3-0.1% (TOBRADEX) 0.3-0.1 % DrpS, INSTILL ONE DROP INTO BOTH EYES EVERY 6 HOURS SHAKE GENTLY BEFORE USE, Disp: 5 mL, Rfl: 1        Review of Systems   Constitutional: Negative for activity change, appetite change, chills and fever.   HENT: Negative for facial swelling and trouble swallowing.    Eyes: Negative for visual disturbance.   Respiratory: Negative for chest tightness and shortness of breath.    Cardiovascular: Negative for chest pain and palpitations.   Gastrointestinal: Negative.  Negative for abdominal pain, constipation, diarrhea, nausea and vomiting.   Genitourinary: Negative for difficulty urinating, dysuria, flank pain, hematuria, penile pain, penile swelling, scrotal swelling and testicular pain.        Ok with urination;   Musculoskeletal: Negative for back pain, gait problem, myalgias and neck stiffness.   Skin: Negative for rash.   Neurological: Negative for dizziness and speech difficulty.   Hematological: Does not bruise/bleed easily.   Psychiatric/Behavioral: Negative for behavioral problems.       Objective:      Physical Exam   Nursing note and vitals reviewed.  Constitutional:  He is oriented to person, place, and time. Vital signs are normal. He appears well-developed and well-nourished. He is active and cooperative.  Non-toxic appearance. He does not have a sickly appearance.   HENT:   Head: Normocephalic and atraumatic.   Right Ear: External ear normal.   Left Ear: External ear normal.   Nose: Nose normal.   Mouth/Throat: Mucous membranes are normal.   Eyes: Conjunctivae and lids are normal. No scleral icterus.   Neck: Trachea normal, normal range of motion and full passive range of motion without pain. Neck supple. No JVD present. No tracheal deviation present.   Cardiovascular: Normal rate, S1 normal and S2 normal.    Pulmonary/Chest: Effort normal. No respiratory distress. He exhibits no tenderness.   Abdominal: Soft. Normal appearance and bowel sounds are normal. There is no hepatosplenomegaly. There is no tenderness. There is no CVA tenderness.   Genitourinary: Testes normal and penis normal. No penile tenderness.       Musculoskeletal: Normal range of motion.   Neurological: He is alert and oriented to person, place, and time. He has normal strength.   Skin: Skin is warm, dry and intact.     Psychiatric: He has a normal mood and affect. His behavior is normal. Judgment and thought content normal.       Assessment:       1. Prostate cancer    2. History of radical prostatectomy    3. Erectile dysfunction following radical prostatectomy        Plan:         I spent 60 minutes with the patient of which more than half was spent in direct consultation with the patient in regards to our treatment and plan.    Education and recommendations of today's plan of care including home remedies.  Refilled Revatio.    We discussed ED and the contributing factors. We reviewed his personal factors that contribute to ED. Patient was educated on ED treatments. We discussed PDE-5 inhibitors, SARAHI, Muse, and IPP.    He is here today for the Intracorporal injection/ICI education and first injection.   Educational materials were supplied.    ICI is an injectable medication that consists of three different medications to produce an erection. It is known as a Trimix Compound or PEP. The medication is a compound medication and is only mixed up at certain pharmacies known as a compound pharmacy. The medication has to be stored in the refrigerator. Improper storage decreases the effectiveness of the medication.     He was educated that it is an injection. With any injection there is risk for possible pain at the injection site as well as risk for an infection. Clean technique must be followed to help prevent infection. Proper needle disposable is necessary. He voices understanding.    The injection is best given when standing up and the penis is positioned perpendicular to the body. The urethral opening should be facing away from the body. Injection is always given on the lateral or side of the penis. Never give the injection to the top of the penis to avoid possible trauma to arteries and veins. Never give the injection to the bottom of the penis to avoid trauma to the urethra. He should alternate the injection sites to avoid the build up of scar tissue due to multiple injections.    This medication can increase the risk of erections lasting longer than 4 hours. This is known as a priapism and is a medical emergency. This requires immediate medical attention. This is main reason we give the first dose in the office today. We start at low dose and see how well the patients reacts. We can increase the medication if needed depending on the reaction the patient receives today. We discussed the fine line between enough medication for penetration and too much leading to a priapism. He voices understanding.    He witnessed me draw up 10 units (0.10ml) and he injected him in the right lateral aspect of the penis. Within ~10 minutes, he achieved an erection firm enough for intercourse. There was no discomfort, he was pleased  would like a little firmer. We discussed risks with medication. Sexual stimulation helps. Will give more time to see how he does.    He was instructed to keep the dosage at 15 units. If noticing a decrease in reaction he can increase the dosage by 5 units or 0.05ml. He may also refill the Rx.     If have any questions, please give me a call. Educational materials were given to patient and all questions were answered. He voiced understanding and left the office without a priapism.

## 2018-09-05 ENCOUNTER — PATIENT MESSAGE (OUTPATIENT)
Dept: OTOLARYNGOLOGY | Facility: CLINIC | Age: 63
End: 2018-09-05

## 2018-09-13 RX ORDER — MONTELUKAST SODIUM 10 MG/1
10 TABLET ORAL NIGHTLY
Qty: 90 TABLET | Refills: 4 | Status: SHIPPED | OUTPATIENT
Start: 2018-09-13 | End: 2019-10-16 | Stop reason: SDUPTHER

## 2018-09-17 ENCOUNTER — PATIENT MESSAGE (OUTPATIENT)
Dept: FAMILY MEDICINE | Facility: CLINIC | Age: 63
End: 2018-09-17

## 2018-10-01 ENCOUNTER — PATIENT MESSAGE (OUTPATIENT)
Dept: FAMILY MEDICINE | Facility: CLINIC | Age: 63
End: 2018-10-01

## 2018-10-03 ENCOUNTER — OFFICE VISIT (OUTPATIENT)
Dept: OTOLARYNGOLOGY | Facility: CLINIC | Age: 63
End: 2018-10-03
Payer: COMMERCIAL

## 2018-10-03 VITALS — WEIGHT: 215.38 LBS | HEIGHT: 72 IN | BODY MASS INDEX: 29.17 KG/M2

## 2018-10-03 DIAGNOSIS — H69.91 ETD (EUSTACHIAN TUBE DYSFUNCTION), RIGHT: Primary | ICD-10-CM

## 2018-10-03 DIAGNOSIS — J30.1 SEASONAL ALLERGIC RHINITIS DUE TO POLLEN: ICD-10-CM

## 2018-10-03 PROCEDURE — 99999 PR PBB SHADOW E&M-EST. PATIENT-LVL II: CPT | Mod: PBBFAC,,, | Performed by: OTOLARYNGOLOGY

## 2018-10-03 PROCEDURE — 99213 OFFICE O/P EST LOW 20 MIN: CPT | Mod: S$GLB,,, | Performed by: OTOLARYNGOLOGY

## 2018-10-03 PROCEDURE — 3008F BODY MASS INDEX DOCD: CPT | Mod: CPTII,S$GLB,, | Performed by: OTOLARYNGOLOGY

## 2018-10-03 NOTE — PROGRESS NOTES
Subjective:       Patient ID: Blayne Mendoza is a 63 y.o. male.    Chief Complaint: Follow-up and Fluid in ears    Blayne is here for follow-up of bilateral SNHL and subclinical ETD. He has been taking allergy medications with improvement. He recently traveled to a dry environment and felt much better.     He does have a history of AR and had IT as a child.     Review of Systems   Constitutional: Negative for activity change and appetite change.   Respiratory: Negative for difficulty breathing and wheezing   Cardiovascular: Negative for chest pain.      Objective:        Constitutional:   Vital signs are normal. He appears well-developed and well-nourished.     Head:  Normocephalic and atraumatic.     Ears:  Hearing normal to normal and whispered voice; external ear normal without scars, lesions, or masses; ear canal, tympanic membrane, and middle ear normal..     Nose:  Mucosal edema and rhinorrhea (mild) present. Turbinate hypertrophy.          Tests / Results:  None    Assessment:       1. ETD (Eustachian tube dysfunction), right    2. Seasonal allergic rhinitis due to pollen          Plan:       Ears symptomatically improved with allergy regimen.  Continue INS and oral AH and wean as needed to see when meds are needed (seasonal or perennial)  HA candidate. He is declining for now.  Fu 1 year with audio or sooner prn

## 2018-11-21 ENCOUNTER — PATIENT MESSAGE (OUTPATIENT)
Dept: UROLOGY | Facility: CLINIC | Age: 63
End: 2018-11-21

## 2018-12-20 ENCOUNTER — PATIENT MESSAGE (OUTPATIENT)
Dept: FAMILY MEDICINE | Facility: CLINIC | Age: 63
End: 2018-12-20

## 2018-12-20 ENCOUNTER — TELEPHONE (OUTPATIENT)
Dept: FAMILY MEDICINE | Facility: CLINIC | Age: 63
End: 2018-12-20

## 2018-12-20 DIAGNOSIS — C61 PROSTATE CANCER: ICD-10-CM

## 2018-12-20 DIAGNOSIS — Z00.00 ROUTINE HEALTH MAINTENANCE: Primary | ICD-10-CM

## 2019-01-09 ENCOUNTER — OFFICE VISIT (OUTPATIENT)
Dept: FAMILY MEDICINE | Facility: CLINIC | Age: 64
End: 2019-01-09
Payer: COMMERCIAL

## 2019-01-09 VITALS
HEIGHT: 72 IN | HEART RATE: 80 BPM | TEMPERATURE: 99 F | WEIGHT: 218.63 LBS | SYSTOLIC BLOOD PRESSURE: 133 MMHG | BODY MASS INDEX: 29.61 KG/M2 | DIASTOLIC BLOOD PRESSURE: 77 MMHG

## 2019-01-09 DIAGNOSIS — J30.89 NON-SEASONAL ALLERGIC RHINITIS, UNSPECIFIED TRIGGER: Primary | ICD-10-CM

## 2019-01-09 DIAGNOSIS — H69.90 DYSFUNCTION OF EUSTACHIAN TUBE, UNSPECIFIED LATERALITY: ICD-10-CM

## 2019-01-09 DIAGNOSIS — L73.9 FOLLICULITIS: ICD-10-CM

## 2019-01-09 DIAGNOSIS — H91.90 HEARING LOSS, UNSPECIFIED HEARING LOSS TYPE, UNSPECIFIED LATERALITY: ICD-10-CM

## 2019-01-09 PROCEDURE — 3008F BODY MASS INDEX DOCD: CPT | Mod: CPTII,S$GLB,, | Performed by: FAMILY MEDICINE

## 2019-01-09 PROCEDURE — 99214 OFFICE O/P EST MOD 30 MIN: CPT | Mod: S$GLB,,, | Performed by: FAMILY MEDICINE

## 2019-01-09 PROCEDURE — 99999 PR PBB SHADOW E&M-EST. PATIENT-LVL III: CPT | Mod: PBBFAC,,, | Performed by: FAMILY MEDICINE

## 2019-01-09 PROCEDURE — 3008F PR BODY MASS INDEX (BMI) DOCUMENTED: ICD-10-PCS | Mod: CPTII,S$GLB,, | Performed by: FAMILY MEDICINE

## 2019-01-09 PROCEDURE — 99214 PR OFFICE/OUTPT VISIT, EST, LEVL IV, 30-39 MIN: ICD-10-PCS | Mod: S$GLB,,, | Performed by: FAMILY MEDICINE

## 2019-01-09 PROCEDURE — 99999 PR PBB SHADOW E&M-EST. PATIENT-LVL III: ICD-10-PCS | Mod: PBBFAC,,, | Performed by: FAMILY MEDICINE

## 2019-01-09 RX ORDER — MONTELUKAST SODIUM 10 MG/1
10 TABLET ORAL NIGHTLY
Status: ON HOLD | COMMUNITY
End: 2020-06-25 | Stop reason: HOSPADM

## 2019-01-09 RX ORDER — LORATADINE 10 MG/1
10 TABLET ORAL DAILY PRN
COMMUNITY
End: 2021-07-28

## 2019-01-09 RX ORDER — DOXYCYCLINE 100 MG/1
100 CAPSULE ORAL DAILY
Qty: 30 CAPSULE | Refills: 3 | Status: SHIPPED | OUTPATIENT
Start: 2019-01-09 | End: 2019-03-14

## 2019-01-09 RX ORDER — HYDROXYZINE HYDROCHLORIDE 25 MG/1
TABLET, FILM COATED ORAL
Qty: 60 TABLET | Refills: 1 | Status: SHIPPED | OUTPATIENT
Start: 2019-01-09 | End: 2020-04-21 | Stop reason: SDUPTHER

## 2019-01-09 NOTE — PROGRESS NOTES
Blayne Mendoza presents with moderate AR w rhinnorhea and pressure [place] rt ear. ENT eval noted. Has been on H1H2 and montelukast wo control. Hd desensitization as a child.   Also recurrent folliculitis back, prev partial response to topical AB     Past Medical History:   Diagnosis Date    Allergy     Cancer     Prostate    Colon polyp      Past Surgical History:   Procedure Laterality Date    COLONOSCOPY N/A 12/1/2015    [facility]    PROSTATE SURGERY  2008    radical prostatectomy    VASECTOMY       Review of patient's allergies indicates:   Allergen Reactions    Penicillins      Other reaction(s): Swelling    Sulfa (sulfonamide antibiotics)      Other reaction(s): Rash    Bactrim  [sulfamethoxazole-trimethoprim] Rash     Current Outpatient Medications on File Prior to Visit   Medication Sig Dispense Refill    fluocinolone (SYNALAR) 0.01 % Sham Apply topically once daily at 6am. 120 mL 3    hydrOXYzine HCl (ATARAX) 25 MG tablet Take 1/2 to 1 tablet for itching as needed.      insulin syringe-needle U-100 1 mL 31 gauge x 5/16 Syrg Use as directed with ICI Therapy 10 each 12    loratadine (CLARITIN) 10 mg tablet Take 10 mg by mouth once daily.      montelukast (SINGULAIR) 10 mg tablet Take 10 mg by mouth every evening.      papaverine 30 mg/mL injection Add: Phentolamine 10 mg  Add: PGE1 100 mcg    Sig:  Inject 15 units (0.15 mls) as directed 10 mL 11    ranitidine (ZANTAC) 300 MG tablet TAKE ONE TABLET BY MOUTH EVERY EVENING 90 tablet 4    selenium sulfide 2.5 % Lotn Shampoo lather 3-5 mins nightly for itching.      sildenafil (REVATIO) 20 mg Tab Take 5 tablets by mouth 1 hour before sexual activity 90 tablet 11    [DISCONTINUED] tobramycin-dexamethasone 0.3-0.1% (TOBRADEX) 0.3-0.1 % DrpS INSTILL ONE DROP INTO BOTH EYES EVERY 6 HOURS SHAKE GENTLY BEFORE USE 5 mL 1     No current facility-administered medications on file prior to visit.      Social History      Socioeconomic History    Marital status:      Spouse name: Not on file    Number of children: Not on file    Years of education: Not on file    Highest education level: Not on file   Social Needs    Financial resource strain: Not on file    Food insecurity - worry: Not on file    Food insecurity - inability: Not on file    Transportation needs - medical: Not on file    Transportation needs - non-medical: Not on file   Occupational History    Not on file   Tobacco Use    Smoking status: Former Smoker     Packs/day: 0.00     Types: Cigars     Last attempt to quit: 1982     Years since quittin.1    Smokeless tobacco: Never Used   Substance and Sexual Activity    Alcohol use: Yes     Alcohol/week: 7.0 oz     Types: 14 Standard drinks or equivalent per week    Drug use: No    Sexual activity: Yes     Partners: Female   Other Topics Concern    Not on file   Social History Narrative    Not on file     History reviewed. No pertinent family history.      ROS:  SKIN: No rashes, itching or changes in color or texture of skin.  EYES: Visual acuity fine. No photophobia, ocular pain or diplopia.EARS: Denies ear pain, discharge or vertigo.NOSE: No loss of smell, no epistaxis some postnasal drip.MOUTH & THROAT: No hoarseness or change in voice. No excessive gum bleeding.CHEST: Denies SHAW, cyanosis, wheezing  CARDIOVASCULAR: Denies chest pain, PND, orthopnea or reduced exercise tolerance.  ABDOMEN:  No weight loss.No abdominal pain, no hematemesis or blood in stool.  URINARY: No flank pain, dysuria or hematuria.  PERIPHERAL VASCULAR: No claudication or cyanosis.  MUSCULOSKELETAL: Negative   NEUROLOGIC: No history of seizures, paralysis, alteration of gait or coordination.    PE: Vital signs as noted  Heent:Normocephalic with no recent cranial trauma,PERRLA,EOMI,conjunctiva clear,fundi reveal no hemmorhage exudate or papilledema.Otic canals clear, tympanic membranes slightly dull  bilaterally.Nasal mucosa slightly red and edematous.Posterior pharynx slightly red but without exudate.  Neck:Supple with minimal anterior cervical adenopathy.  Chest:Clear bilateral breath sounds   Heart:Regular rhthym without murmer  Abdomen:Soft, non tender,no masses, no hepatosplenomegalyExtremeties and Neurologic:Grossly within normal limits  Skin: Gen papular/pustular eruptions back.   Impression:AR  ETD  Hearing loss  Folliculitis  Plan: Short trial afrin to see if hearing improves if ETD better  Allergy con  Doxy 100 for folliculitis  Hydroxyzine hs prn itching

## 2019-01-21 ENCOUNTER — PATIENT OUTREACH (OUTPATIENT)
Dept: ADMINISTRATIVE | Facility: HOSPITAL | Age: 64
End: 2019-01-21

## 2019-01-21 DIAGNOSIS — Z11.59 NEED FOR HEPATITIS C SCREENING TEST: Primary | ICD-10-CM

## 2019-01-28 ENCOUNTER — OFFICE VISIT (OUTPATIENT)
Dept: ALLERGY | Facility: CLINIC | Age: 64
End: 2019-01-28
Payer: COMMERCIAL

## 2019-01-28 ENCOUNTER — LAB VISIT (OUTPATIENT)
Dept: LAB | Facility: HOSPITAL | Age: 64
End: 2019-01-28
Attending: ALLERGY & IMMUNOLOGY
Payer: COMMERCIAL

## 2019-01-28 VITALS
HEART RATE: 74 BPM | BODY MASS INDEX: 29.77 KG/M2 | WEIGHT: 219.81 LBS | TEMPERATURE: 97 F | HEIGHT: 72 IN | RESPIRATION RATE: 15 BRPM

## 2019-01-28 DIAGNOSIS — J30.9 ALLERGIC RHINITIS, UNSPECIFIED SEASONALITY, UNSPECIFIED TRIGGER: ICD-10-CM

## 2019-01-28 DIAGNOSIS — L29.9 ITCHING: ICD-10-CM

## 2019-01-28 DIAGNOSIS — H69.91 DYSFUNCTION OF RIGHT EUSTACHIAN TUBE: ICD-10-CM

## 2019-01-28 DIAGNOSIS — C61 PROSTATE CANCER: ICD-10-CM

## 2019-01-28 DIAGNOSIS — R05.9 COUGH: ICD-10-CM

## 2019-01-28 DIAGNOSIS — L30.9 DERMATITIS: Primary | ICD-10-CM

## 2019-01-28 DIAGNOSIS — L71.9 ROSACEA: ICD-10-CM

## 2019-01-28 DIAGNOSIS — L30.9 DERMATITIS: ICD-10-CM

## 2019-01-28 LAB
IGA SERPL-MCNC: 150 MG/DL
IGE SERPL-ACNC: 75 IU/ML
IGG SERPL-MCNC: 1092 MG/DL
IGM SERPL-MCNC: 103 MG/DL

## 2019-01-28 PROCEDURE — 86162 COMPLEMENT TOTAL (CH50): CPT

## 2019-01-28 PROCEDURE — 99999 PR PBB SHADOW E&M-EST. PATIENT-LVL III: ICD-10-PCS | Mod: PBBFAC,,, | Performed by: ALLERGY & IMMUNOLOGY

## 2019-01-28 PROCEDURE — 3008F PR BODY MASS INDEX (BMI) DOCUMENTED: ICD-10-PCS | Mod: CPTII,S$GLB,, | Performed by: ALLERGY & IMMUNOLOGY

## 2019-01-28 PROCEDURE — 86003 ALLG SPEC IGE CRUDE XTRC EA: CPT

## 2019-01-28 PROCEDURE — 86774 TETANUS ANTIBODY: CPT

## 2019-01-28 PROCEDURE — 36415 COLL VENOUS BLD VENIPUNCTURE: CPT

## 2019-01-28 PROCEDURE — 99205 OFFICE O/P NEW HI 60 MIN: CPT | Mod: S$GLB,,, | Performed by: ALLERGY & IMMUNOLOGY

## 2019-01-28 PROCEDURE — 82787 IGG 1 2 3 OR 4 EACH: CPT

## 2019-01-28 PROCEDURE — 99205 PR OFFICE/OUTPT VISIT, NEW, LEVL V, 60-74 MIN: ICD-10-PCS | Mod: S$GLB,,, | Performed by: ALLERGY & IMMUNOLOGY

## 2019-01-28 PROCEDURE — 82784 ASSAY IGA/IGD/IGG/IGM EACH: CPT

## 2019-01-28 PROCEDURE — 99999 PR PBB SHADOW E&M-EST. PATIENT-LVL III: CPT | Mod: PBBFAC,,, | Performed by: ALLERGY & IMMUNOLOGY

## 2019-01-28 PROCEDURE — 3008F BODY MASS INDEX DOCD: CPT | Mod: CPTII,S$GLB,, | Performed by: ALLERGY & IMMUNOLOGY

## 2019-01-28 PROCEDURE — 82785 ASSAY OF IGE: CPT

## 2019-01-28 PROCEDURE — 86003 ALLG SPEC IGE CRUDE XTRC EA: CPT | Mod: 59

## 2019-01-28 NOTE — PROGRESS NOTES
Chief complaint:  Concerned about possible allergy, skin inflammation, and immune system    This note was dictated using voice recognition software and may contain errors.    History:    He had a 10:00 a.m. appointment on Monday January 28, 2019.    Information in his medical record regarding his past medical history family history and social history was reviewed and updated today.  Significant addition see if any are as noted below.    He has a number of health concerns.  He is concerned regarding possible respiratory allergy.  He has a history of nasal symptoms dating back to his childhood.  He stated probably around age 5 or 7 an allergy evaluation was performed.  He received allergy injections for 10 years.  In recent years he has been aware of the development of respiratory infections typically occurring in September in April.  He is concerned about a possible allergic contribution to some of his symptoms.    In the past his dermatologist diagnosed him with rosacea.  He stated he develops inflammatory skin lesions in his scalp and also on the skin of the back.    He experiences itching of the skin of the scrotum without the development of any dermatitis.    He stated in 2017 he had significant respiratory symptoms in developed pneumonia.    He has no history of osteomyelitis or meningitis.    Family history is positive for rhinitis.  He stated his brother received immunotherapy injections.    Social history:  He works for [employer] and has done so for the past 42 years.  He indoor room unit air filters, purifier hours, or ionizers.    Review of systems:  See above.  At this time he is taking antihistamines on a daily basis and also Singulair on a daily basis.  Upon occasion he experiences a sensation of his right ear feeling stopped.    Exam:    In general he is in no distress.  He is alert oriented well-developed in good mood and attentive  Gait steady  Skin small areas of erythema approximately 0.5  cm to 1 cm circular her oval in shape are present on the skin of the back.  No urticaria is noted inspection of the skin of the inguinal areas did not reveal inflammation or changes suggestive of a dermatophyte  Head no swelling noted  Eyes scleral white conjunctiva pink  Nose patent no polyp seen  Mouth no inflammation or swelling of the lips tongue or in the throat noted  Ears not inflamed tympanic membranes not inflamed  Neck no masses or thyromegaly noted  Lymph nodes no significant cervical or epitrochlear lymphadenopathy noted  Heart regular rhythm no murmurs heard  Lungs clear to auscultation  Extremities no swelling or inflammation of the hands legs noted    Impression:    1.  Rosacea  2.  Dermatitis of the skin of the back, exact cause uncertain  3.  Itching  4.  Chronic rhinitis, history of allergic rhinitis  5.  Prostate cancer status post treatment  6.  Other health concerns as noted in his medical record  7.  Eustachian tube dysfunction    Assessment and plan:    His appointment was 60 minutes in duration spent entirely in face-to-face contact.  More than 50% of the visit was spent in counseling and coordination of care.    He is taking oral antihistamines at this time.  I did not recommend immediate hypersensitivity skin testing be performed in view of his use of oral antihistamines.  I did suggest that blood be drawn for measurement of IgE directed against selected allergens and also for the performance of quantitative immunoglobulins and antigen specific antibody levels.  A total complement level will be measured.  When the results of the diagnostic tests are available to review with him I will do so.  Additional recommendations may be made at that time.    I recommended that he continue to take medications as directed.    I suggested that he consider using topical Hibiclens on the skin of his back.  I emphasized the importance of being certain that the Hibiclens, when used, be allowed to remain in  contact with his skin for at least 3 minutes prior to washing off the topical Hibiclens.  I discussed with him the rationale for using this topical product for treatment of the dermatitis of his back.    In view of his history of right-sided sensation of his ear being stopped up, in addition to possible eustachian tube dysfunction, I told him it would be important not to overlook pathology of the right temporomandibular joint.    He was given the office phone number.  Should he have additional questions or concerns she was instructed to call.

## 2019-01-28 NOTE — LETTER
January 28, 2019      Christopher Méndez MD  [address] [facility] [address]           [facility] Allergy/ Immunology  [address] [facility] [address]  Phone: [phone]  Fax: [phone]          Patient: Blayne Mendoza   MR Number: [id number]   YOB: 1955   Date of Visit: 1/28/2019       Dear :    Thank you for referring Blayne Mendoza to me for evaluation. Attached you will find relevant portions of my assessment and plan of care.    If you have questions, please do not hesitate to call me. I look forward to following Blayne Mendoza along with you.    Sincerely,    Josep Gregory MD    Enclosure  CC:  No Recipients    If you would like to receive this communication electronically, please contact [email or web address] or [phone] to request more information on [employer] Link access.    For providers and/or their staff who would like to refer a patient to [facility], please contact us through our one-stop-shop provider referral line, [facility], at [phone].    If you feel you have received this communication in error or would no longer like to receive these types of communications, please e-mail [email or web address]

## 2019-01-29 ENCOUNTER — PATIENT MESSAGE (OUTPATIENT)
Dept: ALLERGY | Facility: CLINIC | Age: 64
End: 2019-01-29

## 2019-01-31 LAB
A ALTERNATA IGE QN: <0.35 KU/L
A FUMIGATUS IGE QN: <0.35 KU/L
BAHIA GRASS IGE QN: <0.35 KU/L
BALD CYPRESS IGE QN: <0.35 KU/L
BERMUDA GRASS IGE QN: <0.35 KU/L
C HERBARUM IGE QN: <0.35 KU/L
COMMON RAGWEED IGE QN: <0.35 KU/L
D FARINAE IGE QN: 0.61 KU/L
D PTERONYSS IGE QN: 0.54 KU/L
DEPRECATED A ALTERNATA IGE RAST QL: NORMAL
DEPRECATED A FUMIGATUS IGE RAST QL: NORMAL
DEPRECATED BAHIA GRASS IGE RAST QL: NORMAL
DEPRECATED BALD CYPRESS IGE RAST QL: NORMAL
DEPRECATED BERMUDA GRASS IGE RAST QL: NORMAL
DEPRECATED C HERBARUM IGE RAST QL: NORMAL
DEPRECATED COMMON RAGWEED IGE RAST QL: NORMAL
DEPRECATED D FARINAE IGE RAST QL: ABNORMAL
DEPRECATED D PTERONYSS IGE RAST QL: ABNORMAL
DEPRECATED DOG DANDER IGE RAST QL: NORMAL
DEPRECATED ELDER IGE RAST QL: NORMAL
DEPRECATED PECAN/HICK TREE IGE RAST QL: NORMAL
DEPRECATED TIMOTHY IGE RAST QL: NORMAL
DEPRECATED WHITE OAK IGE RAST QL: NORMAL
DEPRECATED WILLOW IGE RAST QL: NORMAL
DOG DANDER IGE QN: <0.35 KU/L
ELDER IGE QN: <0.35 KU/L
IGG1 SER-MCNC: 482 MG/DL
IGG2 SER-MCNC: 524 MG/DL
IGG3 SER-MCNC: 58 MG/DL
IGG4 SER-MCNC: 31 MG/DL
PECAN/HICK TREE IGE QN: <0.35 KU/L
TIMOTHY IGE QN: <0.35 KU/L
WHITE OAK IGE QN: <0.35 KU/L
WILLOW IGE QN: <0.35 KU/L

## 2019-02-01 ENCOUNTER — LAB VISIT (OUTPATIENT)
Dept: LAB | Facility: HOSPITAL | Age: 64
End: 2019-02-01
Attending: FAMILY MEDICINE
Payer: COMMERCIAL

## 2019-02-01 DIAGNOSIS — Z00.00 ROUTINE HEALTH MAINTENANCE: ICD-10-CM

## 2019-02-01 DIAGNOSIS — C61 PROSTATE CANCER: ICD-10-CM

## 2019-02-01 DIAGNOSIS — Z11.59 NEED FOR HEPATITIS C SCREENING TEST: ICD-10-CM

## 2019-02-01 LAB
ALBUMIN SERPL BCP-MCNC: 4 G/DL
ALP SERPL-CCNC: 81 U/L
ALT SERPL W/O P-5'-P-CCNC: 22 U/L
ANION GAP SERPL CALC-SCNC: 6 MMOL/L
AST SERPL-CCNC: 18 U/L
BASOPHILS # BLD AUTO: 0.04 K/UL
BASOPHILS NFR BLD: 0.7 %
BILIRUB SERPL-MCNC: 0.5 MG/DL
BUN SERPL-MCNC: 20 MG/DL
CALCIUM SERPL-MCNC: 10.2 MG/DL
CH50 SERPL-ACNC: 74 U/ML
CHLORIDE SERPL-SCNC: 106 MMOL/L
CHOLEST SERPL-MCNC: 187 MG/DL
CHOLEST/HDLC SERPL: 4.2 {RATIO}
CO2 SERPL-SCNC: 30 MMOL/L
CREAT SERPL-MCNC: 0.9 MG/DL
DIFFERENTIAL METHOD: ABNORMAL
EOSINOPHIL # BLD AUTO: 0.5 K/UL
EOSINOPHIL NFR BLD: 8.7 %
ERYTHROCYTE [DISTWIDTH] IN BLOOD BY AUTOMATED COUNT: 13.6 %
EST. GFR  (AFRICAN AMERICAN): >60 ML/MIN/1.73 M^2
EST. GFR  (NON AFRICAN AMERICAN): >60 ML/MIN/1.73 M^2
GLUCOSE SERPL-MCNC: 105 MG/DL
HCT VFR BLD AUTO: 44.4 %
HDLC SERPL-MCNC: 45 MG/DL
HDLC SERPL: 24.1 %
HGB BLD-MCNC: 14.2 G/DL
IMM GRANULOCYTES # BLD AUTO: 0.04 K/UL
IMM GRANULOCYTES NFR BLD AUTO: 0.7 %
LDLC SERPL CALC-MCNC: 121.8 MG/DL
LYMPHOCYTES # BLD AUTO: 1.4 K/UL
LYMPHOCYTES NFR BLD: 23.6 %
MCH RBC QN AUTO: 31 PG
MCHC RBC AUTO-ENTMCNC: 32 G/DL
MCV RBC AUTO: 97 FL
MONOCYTES # BLD AUTO: 0.7 K/UL
MONOCYTES NFR BLD: 11.3 %
NEUTROPHILS # BLD AUTO: 3.2 K/UL
NEUTROPHILS NFR BLD: 55 %
NONHDLC SERPL-MCNC: 142 MG/DL
NRBC BLD-RTO: 0 /100 WBC
PLATELET # BLD AUTO: 220 K/UL
PMV BLD AUTO: 10.7 FL
POTASSIUM SERPL-SCNC: 4.8 MMOL/L
PROSTATE SPECIFIC ANTIGEN, TOTAL: 0.01 NG/ML
PROT SERPL-MCNC: 7.5 G/DL
PSA FREE MFR SERPL: 100 %
PSA FREE SERPL-MCNC: 0.01 NG/ML
RBC # BLD AUTO: 4.58 M/UL
SODIUM SERPL-SCNC: 142 MMOL/L
TRIGL SERPL-MCNC: 101 MG/DL
TSH SERPL DL<=0.005 MIU/L-ACNC: 1.61 UIU/ML
WBC # BLD AUTO: 5.73 K/UL

## 2019-02-01 PROCEDURE — 80053 COMPREHEN METABOLIC PANEL: CPT

## 2019-02-01 PROCEDURE — 80061 LIPID PANEL: CPT

## 2019-02-01 PROCEDURE — 36415 COLL VENOUS BLD VENIPUNCTURE: CPT | Mod: PO

## 2019-02-01 PROCEDURE — 84154 ASSAY OF PSA FREE: CPT

## 2019-02-01 PROCEDURE — 85025 COMPLETE CBC W/AUTO DIFF WBC: CPT

## 2019-02-01 PROCEDURE — 86803 HEPATITIS C AB TEST: CPT

## 2019-02-01 PROCEDURE — 84443 ASSAY THYROID STIM HORMONE: CPT

## 2019-02-02 ENCOUNTER — TELEPHONE (OUTPATIENT)
Dept: ALLERGY | Facility: CLINIC | Age: 64
End: 2019-02-02

## 2019-02-02 NOTE — TELEPHONE ENCOUNTER
I completed my telephone conversation with him at 4:26 p.m. on Saturday February 2, 2019.  I reviewed with him results of laboratory tests available to review at this time.  He stated the last 2 evenings he used Hibiclens.  When the outstanding lab tests are available to review with him I will do so.    This note was dictated using voice recognition software and may contain errors.

## 2019-02-04 ENCOUNTER — OFFICE VISIT (OUTPATIENT)
Dept: FAMILY MEDICINE | Facility: CLINIC | Age: 64
End: 2019-02-04
Payer: COMMERCIAL

## 2019-02-04 VITALS
DIASTOLIC BLOOD PRESSURE: 65 MMHG | SYSTOLIC BLOOD PRESSURE: 120 MMHG | WEIGHT: 221.19 LBS | TEMPERATURE: 98 F | HEIGHT: 72 IN | BODY MASS INDEX: 29.96 KG/M2 | HEART RATE: 71 BPM

## 2019-02-04 DIAGNOSIS — Z00.00 ROUTINE CHECK-UP: Primary | ICD-10-CM

## 2019-02-04 LAB
C DIPHTHERIAE AB SER IA-ACNC: 0.18 IU/ML
C TETANI AB SER-ACNC: 1.52 IU/ML
DEPRECATED S PNEUM 1 IGG SER-MCNC: 0.8 MCG/ML
DEPRECATED S PNEUM12 IGG SER-MCNC: <0.3 MCG/ML
DEPRECATED S PNEUM14 IGG SER-MCNC: 3.8 MCG/ML
DEPRECATED S PNEUM19 IGG SER-MCNC: 1.7 MCG/ML
DEPRECATED S PNEUM23 IGG SER-MCNC: <0.3 MCG/ML
DEPRECATED S PNEUM3 IGG SER-MCNC: 0.7 MCG/ML
DEPRECATED S PNEUM4 IGG SER-MCNC: <0.3 MCG/ML
DEPRECATED S PNEUM5 IGG SER-MCNC: 2.1 MCG/ML
DEPRECATED S PNEUM8 IGG SER-MCNC: 1 MCG/ML
DEPRECATED S PNEUM9 IGG SER-MCNC: <0.3 MCG/ML
HAEM INFLU B IGG SER-MCNC: >9 MG/L
HCV AB SERPL QL IA: NEGATIVE
S PNEUM DA 18C IGG SER-MCNC: 0.5 MCG/ML
S PNEUM DA 6B IGG SER-MCNC: 0.4 MCG/ML
S PNEUM DA 7F IGG SER-MCNC: <0.3 MCG/ML
S PNEUM DA 9V IGG SER-MCNC: <0.3 MCG/ML

## 2019-02-04 PROCEDURE — 90670 PCV13 VACCINE IM: CPT | Mod: S$GLB,,, | Performed by: FAMILY MEDICINE

## 2019-02-04 PROCEDURE — 99396 PR PREVENTIVE VISIT,EST,40-64: ICD-10-PCS | Mod: 25,S$GLB,, | Performed by: FAMILY MEDICINE

## 2019-02-04 PROCEDURE — 99999 PR PBB SHADOW E&M-EST. PATIENT-LVL III: ICD-10-PCS | Mod: PBBFAC,,, | Performed by: FAMILY MEDICINE

## 2019-02-04 PROCEDURE — 90670 PNEUMOCOCCAL CONJUGATE VACCINE 13-VALENT LESS THAN 5YO & GREATER THAN: ICD-10-PCS | Mod: S$GLB,,, | Performed by: FAMILY MEDICINE

## 2019-02-04 PROCEDURE — 90471 IMMUNIZATION ADMIN: CPT | Mod: S$GLB,,, | Performed by: FAMILY MEDICINE

## 2019-02-04 PROCEDURE — 90471 PNEUMOCOCCAL CONJUGATE VACCINE 13-VALENT LESS THAN 5YO & GREATER THAN: ICD-10-PCS | Mod: S$GLB,,, | Performed by: FAMILY MEDICINE

## 2019-02-04 PROCEDURE — 99396 PREV VISIT EST AGE 40-64: CPT | Mod: 25,S$GLB,, | Performed by: FAMILY MEDICINE

## 2019-02-04 PROCEDURE — 99999 PR PBB SHADOW E&M-EST. PATIENT-LVL III: CPT | Mod: PBBFAC,,, | Performed by: FAMILY MEDICINE

## 2019-02-04 NOTE — PROGRESS NOTES
The patient presents today for general health evaluation and counseling      Past Medical History:  Past Medical History:   Diagnosis Date    Allergy     Cancer     Prostate    Colon polyp      Past Surgical History:   Procedure Laterality Date    COLONOSCOPY N/A 12/1/2015    Performed by Rod Rayo MD at [facility]    PROSTATE SURGERY  2008    radical prostatectomy    VASECTOMY       Review of patient's allergies indicates:   Allergen Reactions    Penicillins      Other reaction(s): Swelling    Sulfa (sulfonamide antibiotics)      Other reaction(s): Rash    Bactrim  [sulfamethoxazole-trimethoprim] Rash     Current Outpatient Medications on File Prior to Visit   Medication Sig Dispense Refill    doxycycline (MONODOX) 100 MG capsule Take 1 capsule (100 mg total) by mouth once daily. 30 capsule 3    hydrOXYzine HCl (ATARAX) 25 MG tablet Take 1/2 to 1 tablet for itching as needed. 60 tablet 1    insulin syringe-needle U-100 1 mL 31 gauge x 5/16 Syrg Use as directed with ICI Therapy 10 each 12    loratadine (CLARITIN) 10 mg tablet Take 10 mg by mouth once daily.      montelukast (SINGULAIR) 10 mg tablet Take 10 mg by mouth every evening.      papaverine 30 mg/mL injection Add: Phentolamine 10 mg  Add: PGE1 100 mcg    Sig:  Inject 15 units (0.15 mls) as directed 10 mL 11    ranitidine (ZANTAC) 300 MG tablet TAKE ONE TABLET BY MOUTH EVERY EVENING 90 tablet 4    sildenafil (REVATIO) 20 mg Tab Take 5 tablets by mouth 1 hour before sexual activity 90 tablet 11    fluocinolone (SYNALAR) 0.01 % Sham Apply topically once daily at 6am. 120 mL 3    selenium sulfide 2.5 % Lotn Shampoo lather 3-5 mins nightly for itching.       No current facility-administered medications on file prior to visit.      Social History     Socioeconomic History    Marital status:      Spouse name: Not on file    Number of children: Not on file    Years of education: Not on file    Highest education level: Not on file    Social Needs    Financial resource strain: Not on file    Food insecurity - worry: Not on file    Food insecurity - inability: Not on file    Transportation needs - medical: Not on file    Transportation needs - non-medical: Not on file   Occupational History    Not on file   Tobacco Use    Smoking status: Former Smoker     Packs/day: 0.00     Types: Cigars     Last attempt to quit: 1982     Years since quittin.2    Smokeless tobacco: Never Used   Substance and Sexual Activity    Alcohol use: Yes     Alcohol/week: 7.0 oz     Types: 14 Standard drinks or equivalent per week    Drug use: No    Sexual activity: Yes     Partners: Female   Other Topics Concern    Not on file   Social History Narrative    Not on file     History reviewed. No pertinent family history.      ROS:GENERAL: No fever, chills, fatigability or weight loss.  SKIN: No rashes, itching or changes in color or texture of skin.  HEAD: No headaches or recent head trauma.EYES: Visual acuity fine. No photophobia, ocular pain or diplopia.EARS: Denies ear pain, discharge or vertigo.NOSE: No loss of smell, no epistaxis or postnasal drip.MOUTH & THROAT: No hoarseness or change in voice. No excessive gum bleeding.NODES: Denies swollen glands.  CHEST: Denies SHAW, cyanosis, wheezing, cough and sputum production.  CARDIOVASCULAR: Denies chest pain, PND, orthopnea or reduced exercise tolerance.  ABDOMEN: Appetite fine. No weight loss. Denies diarrhea, abdominal pain, hematemesis or blood in stool.  URINARY: No flank pain, dysuria or hematuria.  PERIPHERAL VASCULAR: No claudication or cyanosis.  MUSCULOSKELETAL: See above.  NEUROLOGIC: No history of seizures, paralysis, alteration of gait or coordination.  PE:   HEAD: Normocephalic, atraumatic.EYES: PERRL. EOMI.   EARS: TM's intact. Light reflex normal. No retraction or perforation.   NOSE: Mucosa pink. Airway clear.MOUTH & THROAT: No tonsillar enlargement. No pharyngeal erythema or exudate.  No stridor.  NODES: No cervical, axillary or inguinal lymph node enlargement.  CHEST: Lungs clear to auscultation.  CARDIOVASCULAR: Normal S1, S2. No rubs, murmurs or gallops.  ABDOMEN: Bowel sounds normal. Not distended. Soft. No tenderness or masses.  MUSCULOSKELETAL: No palpable abnormality  NEUROLOGIC: Cranial Nerves: II-XII grossly intact.  Motor: 5/5 strength major flexors/extensors.  DTR's: Knees, Ankles 2+ and equal bilaterally; downgoing toes.  Sensory: Intact to light touch distally.  Gait & Posture: Normal gait and fine motion. No cerebellar signs.     Impression:Routine health check  Plan:Lab eval  Rec diet and ex recs  Rev age appropriate screenings    Health Maintenance Due   Topic Date Due    Hepatitis C Screening  1955    Pneumococcal Vaccine (Highest Risk) (1 of 3 - PCV13) 09/27/1973    Zoster Vaccine  09/27/2014

## 2019-02-06 ENCOUNTER — TELEPHONE (OUTPATIENT)
Dept: FAMILY MEDICINE | Facility: CLINIC | Age: 64
End: 2019-02-06

## 2019-02-06 ENCOUNTER — PATIENT MESSAGE (OUTPATIENT)
Dept: FAMILY MEDICINE | Facility: CLINIC | Age: 64
End: 2019-02-06

## 2019-02-06 DIAGNOSIS — L98.9 SKIN LESION: Primary | ICD-10-CM

## 2019-02-07 ENCOUNTER — INITIAL CONSULT (OUTPATIENT)
Dept: DERMATOLOGY | Facility: CLINIC | Age: 64
End: 2019-02-07
Payer: COMMERCIAL

## 2019-02-07 DIAGNOSIS — L57.0 AK (ACTINIC KERATOSIS): ICD-10-CM

## 2019-02-07 DIAGNOSIS — D48.9 NEOPLASM OF UNCERTAIN BEHAVIOR: Primary | ICD-10-CM

## 2019-02-07 DIAGNOSIS — D22.9 MULTIPLE BENIGN NEVI: ICD-10-CM

## 2019-02-07 DIAGNOSIS — Z85.828 HISTORY OF NONMELANOMA SKIN CANCER: ICD-10-CM

## 2019-02-07 DIAGNOSIS — L70.2 ACNE NECROTICA: ICD-10-CM

## 2019-02-07 DIAGNOSIS — L98.9 SKIN EROSION: ICD-10-CM

## 2019-02-07 DIAGNOSIS — L73.9 FOLLICULITIS: ICD-10-CM

## 2019-02-07 DIAGNOSIS — L82.1 SEBORRHEIC KERATOSES: ICD-10-CM

## 2019-02-07 PROCEDURE — 99999 PR PBB SHADOW E&M-EST. PATIENT-LVL II: ICD-10-PCS | Mod: PBBFAC,,, | Performed by: DERMATOLOGY

## 2019-02-07 PROCEDURE — 17000 DESTRUCT PREMALG LESION: CPT | Mod: 59,S$GLB,, | Performed by: DERMATOLOGY

## 2019-02-07 PROCEDURE — 99203 OFFICE O/P NEW LOW 30 MIN: CPT | Mod: 25,S$GLB,, | Performed by: DERMATOLOGY

## 2019-02-07 PROCEDURE — 99203 PR OFFICE/OUTPT VISIT, NEW, LEVL III, 30-44 MIN: ICD-10-PCS | Mod: 25,S$GLB,, | Performed by: DERMATOLOGY

## 2019-02-07 PROCEDURE — 88305 TISSUE EXAM BY PATHOLOGIST: CPT | Performed by: PATHOLOGY

## 2019-02-07 PROCEDURE — 17003 DESTRUCT PREMALG LES 2-14: CPT | Mod: S$GLB,,, | Performed by: DERMATOLOGY

## 2019-02-07 PROCEDURE — 99999 PR PBB SHADOW E&M-EST. PATIENT-LVL II: CPT | Mod: PBBFAC,,, | Performed by: DERMATOLOGY

## 2019-02-07 PROCEDURE — 11102 PR TANGENTIAL BIOPSY, SKIN, SINGLE LESION: ICD-10-PCS | Mod: S$GLB,,, | Performed by: DERMATOLOGY

## 2019-02-07 PROCEDURE — 17000 PR DESTRUCTION(LASER SURGERY,CRYOSURGERY,CHEMOSURGERY),PREMALIGNANT LESIONS,FIRST LESION: ICD-10-PCS | Mod: 59,S$GLB,, | Performed by: DERMATOLOGY

## 2019-02-07 PROCEDURE — 88305 TISSUE SPECIMEN TO PATHOLOGY, DERMATOLOGY: ICD-10-PCS | Mod: 26,,, | Performed by: PATHOLOGY

## 2019-02-07 PROCEDURE — 17003 DESTRUCTION, PREMALIGNANT LESIONS; SECOND THROUGH 14 LESIONS: ICD-10-PCS | Mod: S$GLB,,, | Performed by: DERMATOLOGY

## 2019-02-07 PROCEDURE — 11102 TANGNTL BX SKIN SINGLE LES: CPT | Mod: S$GLB,,, | Performed by: DERMATOLOGY

## 2019-02-07 RX ORDER — CLINDAMYCIN PHOSPHATE 11.9 MG/ML
SOLUTION TOPICAL
Qty: 60 ML | Refills: 3 | Status: SHIPPED | OUTPATIENT
Start: 2019-02-07 | End: 2020-07-28

## 2019-02-07 NOTE — PROGRESS NOTES
Subjective:       Patient ID:  Blayne Mendoza is a 63 y.o. male who presents for   Chief Complaint   Patient presents with    Lesion     Lesion to center of forehead x 6 months. Cryo'd by Dr. Bangura about 1 week ago. Desires removal.  Allergist seen about 1 week ago. Has bumps to back of scalp and back x 1 year. Tx with ketoconazole shampoo for folliculitis with no relief. Allergist stated he believed it was bacterial - being treated with doxycyline.     Previously seen by Dr. Bangura  Previously hx of MOHS BCC on columella 2017        Review of Systems   Constitutional: Negative for fever and chills.   HENT: Negative for sore throat.    Respiratory: Negative for cough.    Gastrointestinal: Negative for nausea and vomiting.   Skin: Positive for activity-related sunscreen use. Negative for itching, rash, dry skin, daily sunscreen use and recent sunburn.   Hematologic/Lymphatic: Does not bruise/bleed easily.        Objective:    Physical Exam   Skin:   Areas Examined (abnormalities noted in diagram):   Scalp / Hair Palpated and Inspected  Head / Face Inspection Performed  Neck Inspection Performed  Chest / Axilla Inspection Performed  Abdomen Inspection Performed  Back Inspection Performed  RUE Inspected  LUE Inspection Performed                   Diagram Legend     Erythematous scaling macule/papule c/w actinic keratosis       Vascular papule c/w angioma      Pigmented verrucoid papule/plaque c/w seborrheic keratosis      Yellow umbilicated papule c/w sebaceous hyperplasia      Irregularly shaped tan macule c/w lentigo     1-2 mm smooth white papules consistent with Milia      Movable subcutaneous cyst with punctum c/w epidermal inclusion cyst      Subcutaneous movable cyst c/w pilar cyst      Firm pink to brown papule c/w dermatofibroma      Pedunculated fleshy papule(s) c/w skin tag(s)      Evenly pigmented macule c/w junctional nevus     Mildly variegated pigmented, slightly irregular-bordered macule c/w mildly  atypical nevus      Flesh colored to evenly pigmented papule c/w intradermal nevus       Pink pearly papule/plaque c/w basal cell carcinoma      Erythematous hyperkeratotic cursted plaque c/w SCC      Surgical scar with no sign of skin cancer recurrence      Open and closed comedones      Inflammatory papules and pustules      Verrucoid papule consistent consistent with wart     Erythematous eczematous patches and plaques     Dystrophic onycholytic nail with subungual debris c/w onychomycosis     Umbilicated papule    Erythematous-base heme-crusted tan verrucoid plaque consistent with inflamed seborrheic keratosis     Erythematous Silvery Scaling Plaque c/w Psoriasis     See annotation          Assessment / Plan:      Pathology Orders:     Normal Orders This Visit    Tissue Specimen To Pathology, Dermatology     Questions:    Directional Terms:  Other(comment)    Clinical Information:  BCC    Specific Site:  Left lower back        Neoplasm of uncertain behavior  -     Tissue Specimen To Pathology, Dermatology    Shave biopsy procedure note:    Shave biopsy performed after verbal consent including risk of infection, scar, recurrence, need for additional treatment of site. Area prepped with alcohol, anesthetized with approximately 1.0cc of 1% lidocaine with epinephrine. Lesional tissue shaved with razor blade. Hemostasis achieved with application of aluminum chloride followed by hyfrecation. No complications. Dressing applied. Wound care explained.    If biopsy positive, schedule procedure for definitive excision vs ED&C    AK (actinic keratosis) on nose and right dorsal hand  Premalignant nature discussed     Cryosurgery Procedure Note    Verbal consent from the patient is obtained including, but not limited to, risk of hypopigmentation/hyperpigmentation, scar, recurrence of lesion. The patient is aware of the precancerous quality and need for treatment of these lesions. Liquid nitrogen cryosurgery is applied to the  2 actinic keratoses, as detailed in the physical exam, to produce a freeze injury. The patient is aware that blisters may form and is instructed on wound care with gentle cleansing and use of vaseline ointment to keep moist until healed. The patient is supplied a handout on cryosurgery and is instructed to call if lesions do not completely resolve.    Seborrheic keratoses  These are benign inherited growths without a malignant potential. Reassurance given to patient. No treatment is necessary.     Multiple benign nevi  Upper body skin examination performed today including at least 6 points as noted in physical examination. Reassurance provided.  Instructed patient to observe lesion(s) for changes and follow up in clinic if changes are noted. Discussed ABCDE's of moles and brochure provided.    Folliculitis   I prescribed clindamycin 1% lotion to be used twice daily to affected areas.   Provided reassurance that this is a benign condition that may wax and wane and can be aggravated by friction and heat.   We also recommend benzoyl peroxide wash each morning.    Acne necrotica  -     clindamycin (CLEOCIN T) 1 % external solution; AAA bid  Dispense: 60 mL; Refill: 3    - The patient was counseled on the chronic waxing and waning nature for this condition.  - Initiate topical clindamycin lotion 1% at least twice daily up to six times per day.   - He can use Ketoconazole 2% shampoo to be applied to the affected areas once daily, lather, wait for 5 minutes, then rinse. Instructed to do this daily for 1 week. Then 1-2 times weekly thereafter as maintenance therapy.   - May alternate with Head & Shoulders, Selsun Blue, or Neutrogena T-Gel shampoo as desired.    Skin erosion on central forehead  - Lesion is healing well s/p cryotherapy one week ago. Recommend giving lesion time to completely heal before any additional procedures.     History of NMSC  Area(s) of previous NMSC evaluated with no signs of recurrence.    Upper  body skin examination performed today including at least 6 points as noted in physical examination. Suspicious lesions noted.             Follow-up in about 6 months (around 8/7/2019).       1. Skin, left lower back, shave biopsy:  - BASAL CELL CARCINOMA WITH NODULAR GROWTH PATTERN.  - THE TUMOR FOCALLY APPROACHES THE DEEP BIOPSY MARGIN.  MICROSCOPIC DESCRIPTION: Sections shows a nodular tumor composed of basaloid cells exhibiting peripheral  palisading, retraction artifact and apoptosis.  Diagnosed by: Sylwia Dunn M.D.    Schedule ED&C with me

## 2019-02-15 ENCOUNTER — TELEPHONE (OUTPATIENT)
Dept: DERMATOLOGY | Facility: CLINIC | Age: 64
End: 2019-02-15

## 2019-02-15 ENCOUNTER — PATIENT MESSAGE (OUTPATIENT)
Dept: DERMATOLOGY | Facility: CLINIC | Age: 64
End: 2019-02-15

## 2019-02-19 ENCOUNTER — PATIENT MESSAGE (OUTPATIENT)
Dept: FAMILY MEDICINE | Facility: CLINIC | Age: 64
End: 2019-02-19

## 2019-02-19 RX ORDER — FLUOCINONIDE GEL 0.5 MG/G
GEL TOPICAL DAILY
Qty: 60 G | Refills: 3 | Status: SHIPPED | OUTPATIENT
Start: 2019-02-19 | End: 2019-03-14 | Stop reason: SDUPTHER

## 2019-02-25 RX ORDER — ALPRAZOLAM 0.25 MG/1
TABLET ORAL
Qty: 60 TABLET | Refills: 1 | Status: SHIPPED | OUTPATIENT
Start: 2019-02-25 | End: 2020-07-28

## 2019-03-07 ENCOUNTER — PATIENT MESSAGE (OUTPATIENT)
Dept: FAMILY MEDICINE | Facility: CLINIC | Age: 64
End: 2019-03-07

## 2019-03-12 ENCOUNTER — PATIENT MESSAGE (OUTPATIENT)
Dept: FAMILY MEDICINE | Facility: CLINIC | Age: 64
End: 2019-03-12

## 2019-03-14 ENCOUNTER — OFFICE VISIT (OUTPATIENT)
Dept: FAMILY MEDICINE | Facility: CLINIC | Age: 64
End: 2019-03-14
Payer: COMMERCIAL

## 2019-03-14 ENCOUNTER — PATIENT MESSAGE (OUTPATIENT)
Dept: FAMILY MEDICINE | Facility: CLINIC | Age: 64
End: 2019-03-14

## 2019-03-14 VITALS
HEIGHT: 72 IN | HEART RATE: 74 BPM | BODY MASS INDEX: 30.34 KG/M2 | WEIGHT: 224 LBS | TEMPERATURE: 98 F | SYSTOLIC BLOOD PRESSURE: 125 MMHG | DIASTOLIC BLOOD PRESSURE: 67 MMHG

## 2019-03-14 DIAGNOSIS — L50.9 URTICARIA: Primary | ICD-10-CM

## 2019-03-14 PROCEDURE — 99999 PR PBB SHADOW E&M-EST. PATIENT-LVL III: ICD-10-PCS | Mod: PBBFAC,,, | Performed by: FAMILY MEDICINE

## 2019-03-14 PROCEDURE — 3008F PR BODY MASS INDEX (BMI) DOCUMENTED: ICD-10-PCS | Mod: CPTII,S$GLB,, | Performed by: FAMILY MEDICINE

## 2019-03-14 PROCEDURE — 99999 PR PBB SHADOW E&M-EST. PATIENT-LVL III: CPT | Mod: PBBFAC,,, | Performed by: FAMILY MEDICINE

## 2019-03-14 PROCEDURE — 3008F BODY MASS INDEX DOCD: CPT | Mod: CPTII,S$GLB,, | Performed by: FAMILY MEDICINE

## 2019-03-14 PROCEDURE — 99213 PR OFFICE/OUTPT VISIT, EST, LEVL III, 20-29 MIN: ICD-10-PCS | Mod: S$GLB,,, | Performed by: FAMILY MEDICINE

## 2019-03-14 PROCEDURE — 99213 OFFICE O/P EST LOW 20 MIN: CPT | Mod: S$GLB,,, | Performed by: FAMILY MEDICINE

## 2019-03-14 RX ORDER — FLUOCINONIDE GEL 0.5 MG/G
GEL TOPICAL DAILY
Qty: 60 G | Refills: 3 | Status: SHIPPED | OUTPATIENT
Start: 2019-03-14 | End: 2019-07-08 | Stop reason: SDUPTHER

## 2019-03-14 NOTE — PROGRESS NOTES
Blayne Mendoza presents with moderate itchy rash primarily trunk. Denies nausea,vomiting,diarrhea or significant fever.    Past Medical History:   Diagnosis Date    Allergy     Cancer     Prostate    Colon polyp      Past Surgical History:   Procedure Laterality Date    COLONOSCOPY N/A 2015    Performed by Rod Rayo MD at [facility]    PROSTATE SURGERY  2008    radical prostatectomy    VASECTOMY       Review of patient's allergies indicates:   Allergen Reactions    Penicillins      Other reaction(s): Swelling    Sulfa (sulfonamide antibiotics)      Other reaction(s): Rash    Bactrim  [sulfamethoxazole-trimethoprim] Rash       Social History     Socioeconomic History    Marital status:      Spouse name: Not on file    Number of children: Not on file    Years of education: Not on file    Highest education level: Not on file   Social Needs    Financial resource strain: Not on file    Food insecurity - worry: Not on file    Food insecurity - inability: Not on file    Transportation needs - medical: Not on file    Transportation needs - non-medical: Not on file   Occupational History    Not on file   Tobacco Use    Smoking status: Former Smoker     Packs/day: 0.00     Types: Cigars     Last attempt to quit: 1982     Years since quittin.3    Smokeless tobacco: Never Used   Substance and Sexual Activity    Alcohol use: Yes     Alcohol/week: 7.0 oz     Types: 14 Standard drinks or equivalent per week    Drug use: No    Sexual activity: Yes     Partners: Female   Other Topics Concern    Not on file   Social History Narrative    Not on file     History reviewed. No pertinent family history.      ROS:  SKIN: No rashes, itching or changes in color or texture of skin.  EYES: Visual acuity fine. No photophobia, ocular pain or diplopia.EARS: Denies ear pain, discharge or vertigo.NOSE: No loss of smell, no epistaxis some postnasal drip.MOUTH & THROAT: No hoarseness or change  in voice. No excessive gum bleeding.CHEST: Denies SHAW, cyanosis, wheezing  CARDIOVASCULAR: Denies chest pain, PND, orthopnea or reduced exercise tolerance.  ABDOMEN:  No weight loss.No abdominal pain, no hematemesis or blood in stool.  URINARY: No flank pain, dysuria or hematuria.  PERIPHERAL VASCULAR: No claudication or cyanosis.  MUSCULOSKELETAL: Negative   NEUROLOGIC: No history of seizures, paralysis, alteration of gait or coordination.    PE: Vital signs as noted  Heent:Normocephalic with no recent cranial trauma,PERRLA,EOMI,conjunctiva clear,fundi reveal no hemmorhage exudate or papilledema.Otic canals clear, tympanic membranes slightly dull bilaterally.Nasal mucosa slightly red and edematous.Posterior pharynx slightly red but without exudate.  Neck:Supple with minimal anterior cervical adenopathy.  Chest:Clear bilateral breath sounds   Heart:Regular rhthym without murmer  Abdomen:Soft, non tender,no masses, no hepatosplenomegalyExtremeties and Neurologic:Grossly within normal limits  Skin: Gen maily back urticaria, 5-6 hard palate lesions Impression:Urticaria   Plan: Incr zantac 300 to bid

## 2019-04-01 ENCOUNTER — PATIENT MESSAGE (OUTPATIENT)
Dept: DERMATOLOGY | Facility: CLINIC | Age: 64
End: 2019-04-01

## 2019-04-22 ENCOUNTER — PROCEDURE VISIT (OUTPATIENT)
Dept: DERMATOLOGY | Facility: CLINIC | Age: 64
End: 2019-04-22
Payer: COMMERCIAL

## 2019-04-22 DIAGNOSIS — C44.519 BASAL CELL CARCINOMA, TRUNK: Primary | ICD-10-CM

## 2019-04-22 DIAGNOSIS — L73.9 FOLLICULITIS: ICD-10-CM

## 2019-04-22 DIAGNOSIS — L57.0 AK (ACTINIC KERATOSIS): ICD-10-CM

## 2019-04-22 PROCEDURE — 99213 OFFICE O/P EST LOW 20 MIN: CPT | Mod: 25,S$GLB,, | Performed by: DERMATOLOGY

## 2019-04-22 PROCEDURE — 17003 DESTRUCT PREMALG LES 2-14: CPT | Mod: S$GLB,,, | Performed by: DERMATOLOGY

## 2019-04-22 PROCEDURE — 17000 PR DESTRUCTION(LASER SURGERY,CRYOSURGERY,CHEMOSURGERY),PREMALIGNANT LESIONS,FIRST LESION: ICD-10-PCS | Mod: 59,S$GLB,, | Performed by: DERMATOLOGY

## 2019-04-22 PROCEDURE — 17262 PR DESTR MALIG TRUNK,EXTREM 1.1-2 CM: ICD-10-PCS | Mod: 59,S$GLB,, | Performed by: DERMATOLOGY

## 2019-04-22 PROCEDURE — 17262 DSTRJ MAL LES T/A/L 1.1-2.0: CPT | Mod: 59,S$GLB,, | Performed by: DERMATOLOGY

## 2019-04-22 PROCEDURE — 17000 DESTRUCT PREMALG LESION: CPT | Mod: 59,S$GLB,, | Performed by: DERMATOLOGY

## 2019-04-22 PROCEDURE — 99213 PR OFFICE/OUTPT VISIT, EST, LEVL III, 20-29 MIN: ICD-10-PCS | Mod: 25,S$GLB,, | Performed by: DERMATOLOGY

## 2019-04-22 PROCEDURE — 17003 DESTRUCTION, PREMALIGNANT LESIONS; SECOND THROUGH 14 LESIONS: ICD-10-PCS | Mod: S$GLB,,, | Performed by: DERMATOLOGY

## 2019-04-22 RX ORDER — CLINDAMYCIN PHOSPHATE 11.9 MG/ML
SOLUTION TOPICAL 2 TIMES DAILY
Qty: 60 ML | Refills: 3 | Status: SHIPPED | OUTPATIENT
Start: 2019-04-22 | End: 2020-07-28

## 2019-04-22 NOTE — PROGRESS NOTES
Subjective:       Patient ID:  Blayne Mendoza is a 63 y.o. male who presents for   Chief Complaint   Patient presents with    Lesion     Blayne Mendoza a 62yo male presents today for ED&C to Left Lower Back d/t diagnosis of BCC    Lesion         Review of Systems   Constitutional: Negative for fever and chills.   HENT: Negative for sore throat.    Respiratory: Negative for cough.    Gastrointestinal: Negative for nausea and vomiting.   Skin: Positive for activity-related sunscreen use. Negative for itching, rash, dry skin, daily sunscreen use and recent sunburn.   Hematologic/Lymphatic: Does not bruise/bleed easily.        Objective:    Physical Exam       Diagram Legend     Erythematous scaling macule/papule c/w actinic keratosis       Vascular papule c/w angioma      Pigmented verrucoid papule/plaque c/w seborrheic keratosis      Yellow umbilicated papule c/w sebaceous hyperplasia      Irregularly shaped tan macule c/w lentigo     1-2 mm smooth white papules consistent with Milia      Movable subcutaneous cyst with punctum c/w epidermal inclusion cyst      Subcutaneous movable cyst c/w pilar cyst      Firm pink to brown papule c/w dermatofibroma      Pedunculated fleshy papule(s) c/w skin tag(s)      Evenly pigmented macule c/w junctional nevus     Mildly variegated pigmented, slightly irregular-bordered macule c/w mildly atypical nevus      Flesh colored to evenly pigmented papule c/w intradermal nevus       Pink pearly papule/plaque c/w basal cell carcinoma      Erythematous hyperkeratotic cursted plaque c/w SCC      Surgical scar with no sign of skin cancer recurrence      Open and closed comedones      Inflammatory papules and pustules      Verrucoid papule consistent consistent with wart     Erythematous eczematous patches and plaques     Dystrophic onycholytic nail with subungual debris c/w onychomycosis     Umbilicated papule    Erythematous-base heme-crusted tan verrucoid plaque consistent with inflamed  seborrheic keratosis     Erythematous Silvery Scaling Plaque c/w Psoriasis     See annotation      Assessment / Plan:        There are no diagnoses linked to this encounter.         No follow-ups on file.

## 2019-04-22 NOTE — PROGRESS NOTES
Subjective:       Patient ID:  Blayne Mendoza is a 63 y.o. male who presents for   Chief Complaint   Patient presents with    Lesion     Here for electrodesiccation and curettage of BCC on the left lower back. bx done on 2/27/19:    1. Skin, left lower back, shave biopsy:  - BASAL CELL CARCINOMA WITH NODULAR GROWTH PATTERN.  - THE TUMOR FOCALLY APPROACHES THE DEEP BIOPSY MARGIN.  MICROSCOPIC DESCRIPTION: Sections shows a nodular tumor composed of basaloid cells exhibiting peripheral  palisading, retraction artifact and apoptosis.  Diagnosed by: Sylwia Dunn M.D.          Review of Systems     Objective:    Physical Exam   Constitutional: He appears well-developed and well-nourished. No distress.   Neurological: He is alert and oriented to person, place, and time. He is not disoriented.   Psychiatric: He has a normal mood and affect.   Skin:   Areas Examined (abnormalities noted in diagram):   Scalp / Hair Palpated and Inspected  Abdomen Inspection Performed  Back Inspection Performed                   Diagram Legend     Erythematous scaling macule/papule c/w actinic keratosis       Vascular papule c/w angioma      Pigmented verrucoid papule/plaque c/w seborrheic keratosis      Yellow umbilicated papule c/w sebaceous hyperplasia      Irregularly shaped tan macule c/w lentigo     1-2 mm smooth white papules consistent with Milia      Movable subcutaneous cyst with punctum c/w epidermal inclusion cyst      Subcutaneous movable cyst c/w pilar cyst      Firm pink to brown papule c/w dermatofibroma      Pedunculated fleshy papule(s) c/w skin tag(s)      Evenly pigmented macule c/w junctional nevus     Mildly variegated pigmented, slightly irregular-bordered macule c/w mildly atypical nevus      Flesh colored to evenly pigmented papule c/w intradermal nevus       Pink pearly papule/plaque c/w basal cell carcinoma      Erythematous hyperkeratotic cursted plaque c/w SCC      Surgical scar with no sign of skin cancer  recurrence      Open and closed comedones      Inflammatory papules and pustules      Verrucoid papule consistent consistent with wart     Erythematous eczematous patches and plaques     Dystrophic onycholytic nail with subungual debris c/w onychomycosis     Umbilicated papule    Erythematous-base heme-crusted tan verrucoid plaque consistent with inflamed seborrheic keratosis     Erythematous Silvery Scaling Plaque c/w Psoriasis     See annotation      Assessment / Plan:        Basal cell carcinoma, trunk  Electrodessication and Curettage Procedure note:    Verbal consent obtained. Lesional tissue marked and prepped with alcohol. Lesion anesthetized with 1% lidocaine with epinephrine. Curettage and Desiccation x 3 cycles to base. Aluminum chloride for hemostasis. Lesion size after primary curettage: 1.4 cm    Area bandaged and wound care explained.    AK (actinic keratosis)  Premalignant nature discussed     Cryosurgery Procedure Note    Verbal consent from the patient is obtained including, but not limited to, risk of hypopigmentation/hyperpigmentation, scar, recurrence of lesion. The patient is aware of the precancerous quality and need for treatment of these lesions. Liquid nitrogen cryosurgery is applied to the 3 actinic keratoses, as detailed in the physical exam, to produce a freeze injury. The patient is aware that blisters may form and is instructed on wound care with gentle cleansing and use of vaseline ointment to keep moist until healed. The patient is supplied a handout on cryosurgery and is instructed to call if lesions do not completely resolve.      Folliculitis on trunk and acne necrotica on scalp  I prescribed clindamycin 1% solution to be used twice daily to affected areas.   Provided reassurance that this is a benign condition that may wax and wane and can be aggravated by friction and heat.   I also recommend benzoyl peroxide wash each morning.           Follow up in about 3 months (around  7/22/2019) for follow up ED&C .

## 2019-07-08 RX ORDER — FLUOCINONIDE GEL 0.5 MG/G
GEL TOPICAL
Qty: 60 G | Refills: 3 | Status: SHIPPED | OUTPATIENT
Start: 2019-07-08 | End: 2021-07-28

## 2019-07-22 ENCOUNTER — OFFICE VISIT (OUTPATIENT)
Dept: DERMATOLOGY | Facility: CLINIC | Age: 64
End: 2019-07-22
Payer: COMMERCIAL

## 2019-07-22 VITALS — WEIGHT: 224 LBS | RESPIRATION RATE: 18 BRPM | BODY MASS INDEX: 30.34 KG/M2 | HEIGHT: 72 IN

## 2019-07-22 DIAGNOSIS — L28.0 LICHEN SIMPLEX CHRONICUS: ICD-10-CM

## 2019-07-22 DIAGNOSIS — D18.00 ANGIOMA: ICD-10-CM

## 2019-07-22 DIAGNOSIS — Z85.828 HISTORY OF NONMELANOMA SKIN CANCER: ICD-10-CM

## 2019-07-22 DIAGNOSIS — L98.9 DISEASE OF SKIN AND SUBCUTANEOUS TISSUE: ICD-10-CM

## 2019-07-22 DIAGNOSIS — L57.0 AK (ACTINIC KERATOSIS): Primary | ICD-10-CM

## 2019-07-22 DIAGNOSIS — D22.9 MULTIPLE BENIGN NEVI: ICD-10-CM

## 2019-07-22 PROCEDURE — 17000 DESTRUCT PREMALG LESION: CPT | Mod: S$GLB,,, | Performed by: DERMATOLOGY

## 2019-07-22 PROCEDURE — 3008F PR BODY MASS INDEX (BMI) DOCUMENTED: ICD-10-PCS | Mod: CPTII,S$GLB,, | Performed by: DERMATOLOGY

## 2019-07-22 PROCEDURE — 99214 PR OFFICE/OUTPT VISIT, EST, LEVL IV, 30-39 MIN: ICD-10-PCS | Mod: 25,S$GLB,, | Performed by: DERMATOLOGY

## 2019-07-22 PROCEDURE — 99214 OFFICE O/P EST MOD 30 MIN: CPT | Mod: 25,S$GLB,, | Performed by: DERMATOLOGY

## 2019-07-22 PROCEDURE — 17003 DESTRUCT PREMALG LES 2-14: CPT | Mod: S$GLB,,, | Performed by: DERMATOLOGY

## 2019-07-22 PROCEDURE — 17000 PR DESTRUCTION(LASER SURGERY,CRYOSURGERY,CHEMOSURGERY),PREMALIGNANT LESIONS,FIRST LESION: ICD-10-PCS | Mod: S$GLB,,, | Performed by: DERMATOLOGY

## 2019-07-22 PROCEDURE — 17003 DESTRUCTION, PREMALIGNANT LESIONS; SECOND THROUGH 14 LESIONS: ICD-10-PCS | Mod: S$GLB,,, | Performed by: DERMATOLOGY

## 2019-07-22 PROCEDURE — 99999 PR PBB SHADOW E&M-EST. PATIENT-LVL II: CPT | Mod: PBBFAC,,, | Performed by: DERMATOLOGY

## 2019-07-22 PROCEDURE — 99999 PR PBB SHADOW E&M-EST. PATIENT-LVL II: ICD-10-PCS | Mod: PBBFAC,,, | Performed by: DERMATOLOGY

## 2019-07-22 PROCEDURE — 3008F BODY MASS INDEX DOCD: CPT | Mod: CPTII,S$GLB,, | Performed by: DERMATOLOGY

## 2019-07-22 RX ORDER — HYDROCORTISONE 25 MG/G
CREAM TOPICAL 2 TIMES DAILY
Qty: 28 G | Refills: 3 | Status: SHIPPED | OUTPATIENT
Start: 2019-07-22 | End: 2019-09-18 | Stop reason: SDUPTHER

## 2019-07-22 NOTE — PROGRESS NOTES
Subjective:       Patient ID:  Blayne Mendoza is a 63 y.o. male who presents for   Chief Complaint   Patient presents with    Skin Check     Present for FBSE. Last seen 4/22/19 for ED&C of BCC L lower back.  C/o lesion to left upper leg x 6 months. States area will occasionally get irritated especially when pants rub it. Not treating.    Folliculitis- tx with clindamycin 1% lotion with no improvement (per patient). Currently treating with hydroxyzine 25 mg and ketoconazole shampoo with minimal relief. Believes breakout to be stress related. Using Hibiclens once weekly    Phx AKs s/p cryotherapy  Phx of BCC L lower back- ED&C 4/22  No fhx of melanoma    Past Medical History:  No date: Allergy  No date: Basal cell carcinoma  No date: Cancer      Comment:  Prostate  No date: Colon polyp        Review of Systems   Constitutional: Negative for fever and chills.   HENT: Negative for sore throat.    Respiratory: Negative for cough.    Gastrointestinal: Negative for nausea and vomiting.   Skin: Positive for itching and activity-related sunscreen use. Negative for rash, dry skin, daily sunscreen use and recent sunburn.   Hematologic/Lymphatic: Does not bruise/bleed easily.        Objective:    Physical Exam   Constitutional: He appears well-developed and well-nourished. No distress.   Neurological: He is alert and oriented to person, place, and time. He is not disoriented.   Psychiatric: He has a normal mood and affect.   Skin:   Areas Examined (abnormalities noted in diagram):   Scalp / Hair Palpated and Inspected  Head / Face Inspection Performed  Neck Inspection Performed  Chest / Axilla Inspection Performed  Abdomen Inspection Performed  Genitals / Buttocks / Groin Inspection Performed  Back Inspection Performed  RUE Inspected  LUE Inspection Performed  RLE Inspected  LLE Inspection Performed  Nails and Digits Inspection Performed                   Diagram Legend     Erythematous scaling macule/papule c/w actinic  keratosis       Vascular papule c/w angioma      Pigmented verrucoid papule/plaque c/w seborrheic keratosis      Yellow umbilicated papule c/w sebaceous hyperplasia      Irregularly shaped tan macule c/w lentigo     1-2 mm smooth white papules consistent with Milia      Movable subcutaneous cyst with punctum c/w epidermal inclusion cyst      Subcutaneous movable cyst c/w pilar cyst      Firm pink to brown papule c/w dermatofibroma      Pedunculated fleshy papule(s) c/w skin tag(s)      Evenly pigmented macule c/w junctional nevus     Mildly variegated pigmented, slightly irregular-bordered macule c/w mildly atypical nevus      Flesh colored to evenly pigmented papule c/w intradermal nevus       Pink pearly papule/plaque c/w basal cell carcinoma      Erythematous hyperkeratotic cursted plaque c/w SCC      Surgical scar with no sign of skin cancer recurrence      Open and closed comedones      Inflammatory papules and pustules      Verrucoid papule consistent consistent with wart     Erythematous eczematous patches and plaques     Dystrophic onycholytic nail with subungual debris c/w onychomycosis     Umbilicated papule    Erythematous-base heme-crusted tan verrucoid plaque consistent with inflamed seborrheic keratosis     Erythematous Silvery Scaling Plaque c/w Psoriasis     See annotation      Assessment / Plan:        AK (actinic keratosis)  Premalignant nature discussed     Cryosurgery Procedure Note    Verbal consent from the patient is obtained including, but not limited to, risk of hypopigmentation/hyperpigmentation, scar, recurrence of lesion. The patient is aware of the precancerous quality and need for treatment of these lesions. Liquid nitrogen cryosurgery is applied to the 8 actinic keratoses, as detailed in the physical exam, to produce a freeze injury. The patient is aware that blisters may form and is instructed on wound care with gentle cleansing and use of vaseline ointment to keep moist until healed.  The patient is supplied a handout on cryosurgery and is instructed to call if lesions do not completely resolve.      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  Dispense: 28 g; Refill: 3      - I recommended a mild soap and to avoid anti-bacterial soaps which may be too irritating.   - The patient should also use fragrance-free, color-free laundry detergents and avoid dryer sheets which can be irritating.   - The patients skin should be well-moisturized, using a recommended moisturizer multiple times a day as needed.   - I prescribed HC 2.5% cream to be applied twice daily to affected areas for two weeks and then tapered to weekends only.   - Side effects of topical steroids were reviewed with the patient including skin atrophy, striae, telangectasias and tachyphylaxis.   - I recommend he not used any baby wipes in the area.       Disease of skin and subcutaneous tissue - Rash on back - Favor [place]'s with a component of folliculitis  - Dicussed possible punch biopsy vs trial of topical steroid. He will do a trial of topical steroid (already has fluocinonide cream). Recommend he apply this BID x 2 weeks then off x 2 weeks. Repeat as needed. Discussed benign nature of [place]'s but this can be difficult to treat.   - If no improvement, consider punch at next visit.     Angioma  This is a benign vascular lesion. Reassurance given. No treatment required.     Multiple benign nevi  total body skin examination performed today including at least 12 points as noted in physical examination. No lesions suspicious for malignancy noted.  Reassurance provided.  Instructed patient to observe lesion(s) for changes and follow up in clinic if changes are noted. Discussed ABCDE's of moles and brochure provided.      History of nonmelanoma skin cancer  Area of previous NMSC examined. Site well healed with no signs of recurrence.    Total body skin examination performed  today including at least 12 points as noted in physical examination. No lesions suspicious for malignancy noted.               Follow up in about 6 months (around 1/22/2020) for NMSC skin check.

## 2019-07-27 ENCOUNTER — PATIENT MESSAGE (OUTPATIENT)
Dept: UROLOGY | Facility: CLINIC | Age: 64
End: 2019-07-27

## 2019-07-29 ENCOUNTER — TELEPHONE (OUTPATIENT)
Dept: UROLOGY | Facility: CLINIC | Age: 64
End: 2019-07-29

## 2019-07-29 NOTE — TELEPHONE ENCOUNTER
----- Message from Susan Escalante MA sent at 7/29/2019  2:28 PM CDT -----  Contact: [phone]  Needs Advice    Reason for call: asking to speak with Lashonda about an appt. I offered to assist but he declined and  will wait to talk to lashonda     Communication Preference: [phone]    Additional Information:  Please call

## 2019-08-12 ENCOUNTER — OFFICE VISIT (OUTPATIENT)
Dept: UROLOGY | Facility: CLINIC | Age: 64
End: 2019-08-12
Payer: COMMERCIAL

## 2019-08-12 ENCOUNTER — PATIENT MESSAGE (OUTPATIENT)
Dept: UROLOGY | Facility: CLINIC | Age: 64
End: 2019-08-12

## 2019-08-12 VITALS
WEIGHT: 218.25 LBS | BODY MASS INDEX: 29.56 KG/M2 | DIASTOLIC BLOOD PRESSURE: 74 MMHG | HEIGHT: 72 IN | HEART RATE: 88 BPM | SYSTOLIC BLOOD PRESSURE: 144 MMHG

## 2019-08-12 DIAGNOSIS — C61 PROSTATE CANCER: ICD-10-CM

## 2019-08-12 DIAGNOSIS — N52.31 ERECTILE DYSFUNCTION FOLLOWING RADICAL PROSTATECTOMY: Primary | ICD-10-CM

## 2019-08-12 PROCEDURE — 99214 OFFICE O/P EST MOD 30 MIN: CPT | Mod: S$GLB,,, | Performed by: UROLOGY

## 2019-08-12 PROCEDURE — 99214 PR OFFICE/OUTPT VISIT, EST, LEVL IV, 30-39 MIN: ICD-10-PCS | Mod: S$GLB,,, | Performed by: UROLOGY

## 2019-08-12 PROCEDURE — 3008F BODY MASS INDEX DOCD: CPT | Mod: CPTII,S$GLB,, | Performed by: UROLOGY

## 2019-08-12 PROCEDURE — 99999 PR PBB SHADOW E&M-EST. PATIENT-LVL III: ICD-10-PCS | Mod: PBBFAC,,, | Performed by: UROLOGY

## 2019-08-12 PROCEDURE — 3008F PR BODY MASS INDEX (BMI) DOCUMENTED: ICD-10-PCS | Mod: CPTII,S$GLB,, | Performed by: UROLOGY

## 2019-08-12 PROCEDURE — 99999 PR PBB SHADOW E&M-EST. PATIENT-LVL III: CPT | Mod: PBBFAC,,, | Performed by: UROLOGY

## 2019-08-12 NOTE — PROGRESS NOTES
CHIEF COMPLAINT:    Mr. Mendoza is a 63 y.o. male presenting with ED.    PRESENTING ILLNESS:    Blayne Mendoza is a 63 y.o. male with a history of prostate cancer s/p RALP ~  by Dr. Torres.  He was able to have return of his erections.  However, he notes that > 1 year ago, he started having trouble with his erections.  He's tried and failed Viagra.  He's currently using ICI with mixed results.      He is continent.      No bone pain or weight loss.    REVIEW OF SYSTEMS:    Blayne Mendoza denies headache, blurred vision, fever, nausea, vomiting, chills, abdominal pain, bleeding per rectum, cough, SOB, recent loss of consciousness, recent mental status changes, seizures, dizziness, or upper or lower extremity weakness.    GILLIAN  1. 1  2. 2  3. 1  4. 2  5. 2      PATIENT HISTORY:    Past Medical History:   Diagnosis Date    Allergy     Basal cell carcinoma     Cancer     Prostate    Colon polyp        Past Surgical History:   Procedure Laterality Date    COLONOSCOPY N/A 2015    Performed by [facility] at [facility]    PROSTATE SURGERY      radical prostatectomy    VASECTOMY         History reviewed. No pertinent family history.    Social History     Socioeconomic History    Marital status:      Spouse name: Not on file    Number of children: Not on file    Years of education: Not on file    Highest education level: Not on file   Occupational History    Not on file   Social Needs    Financial resource strain: Not on file    Food insecurity:     Worry: Not on file     Inability: Not on file    Transportation needs:     Medical: Not on file     Non-medical: Not on file   Tobacco Use    Smoking status: Former Smoker     Packs/day: 0.00     Types: Cigars     Last attempt to quit: 1982     Years since quittin.7    Smokeless tobacco: Never Used   Substance and Sexual Activity    Alcohol use: Yes     Alcohol/week: 7.0 oz     Types: 14 Standard drinks or equivalent per  week    Drug use: No    Sexual activity: Yes     Partners: Female   Lifestyle    Physical activity:     Days per week: Not on file     Minutes per session: Not on file    Stress: Not on file   Relationships    Social connections:     Talks on phone: Not on file     Gets together: Not on file     Attends Mandaen service: Not on file     Active member of club or organization: Not on file     Attends meetings of clubs or organizations: Not on file     Relationship status: Not on file   Other Topics Concern    Not on file   Social History Narrative    Not on file       Allergies:  Penicillins; Sulfa (sulfonamide antibiotics); and Bactrim  [sulfamethoxazole-trimethoprim]    Medications:    Current Outpatient Medications:     alprazolam (XANAX) 0.25 MG tablet, TAKE 1 TO 2 TABLETS BY MOUTH EVERY 4 HOURS AS NEEDED  MAY CAUSEDROWSINESS , Disp: 60 tablet, Rfl: 2    fluocinolone (SYNALAR) 0.01 % Sham, Apply topically once daily at 6am., Disp: 120 mL, Rfl: 3    hydrOXYzine HCl (ATARAX) 25 MG tablet, Take 1/2 to 1 tablet for itching as needed., Disp: , Rfl:     selenium sulfide 2.5 % Lotn, Shampoo lather 3-5 mins nightly for itching., Disp: , Rfl:     tobramycin-dexamethasone 0.3-0.1% (TOBRADEX) 0.3-0.1 % DrpS, INSTILL ONE DROP INTO BOTH EYES EVERY 6 HOURS SHAKE GENTLY BEFORE USE, Disp: 5 mL, Rfl: 1    PHYSICAL EXAMINATION:    The patient generally appears in good health, is appropriately interactive, and is in no apparent distress.     Eyes: anicteric sclerae, moist conjunctivae; no lid-lag; PERRLA     HENT: Atraumatic; oropharynx clear with moist mucous membranes and no mucosal ulcerations;normal hard and soft palate.  No evidence of lymphadenopathy.    Neck: Trachea midline.  No thyromegaly.    Musculoskeletal: No abnormal gait.    Skin: No lesions.    Mental: Cooperative with normal affect.  Is oriented to time, place, and person.    Neuro: Grossly intact.    Chest: Normal inspiratory effort.   No accessory  muscles.  No audible wheezes.  Respirations symmetric on inspiration and expiration.    Heart: Regular rhythm.      Abdomen:  Soft, non-tender. No masses or organomegaly. Bladder is not palpable. No evidence of flank discomfort. No evidence of inguinal hernia.    Genitourinary: The penis is not circumcised with no evidence of plaques or induration. The urethral meatus is normal. The testes, epididymides, and cord structures are normal in size and contour bilaterally. The scrotum is normal in size and contour.    Extremities: No clubbing, cyanosis, or edema      LABS:    UA dipped negative today  Lab Results   Component Value Date    PSA 0.04 12/15/2015    PSA 0.02 12/09/2014    PSA 0.02 01/03/2013    PSADIAG 0.03 08/20/2018    PSATOTAL 0.01 02/01/2019    PSAFREE 0.01 02/01/2019    PSAFREEPCT 100.00 02/01/2019       IMPRESSION:    Encounter Diagnoses   Name Primary?    Erectile dysfunction following radical prostatectomy Yes    Prostate cancer          PLAN:    1. Discussed options for his ED.  He'd like an IPP.   2. Patient read the IPP handout and understands the risks associated with this procedure. He knows the risk of infection as well as surgery requirements if it were to become infected. He understands the impact on spontaneous and nocturnal erections, as well as need for replacement and repair, which was clearly outlined in verbal and written form. He was given the chance to ask questions and alternatives were discussed.  3. Risks discussed were You have elected to undergo placement of a penile prosthesis. Several devices are currently available in the marketplace, including those which are non-inflatable, versus two- or three-piece inflatable prostheses. All of these devices have the capacity to give you the opportunity to have a rigid penis on demand and to be used as frequently and as long as you would like. There are, therefore, many advantages to the use of the prosthesis which you have already  discussed with your physician. The goal of this informed consent form is to identify the potential problems that have been recognized to occur with penile prosthesis placement and that you understand the risks of undergoing prosthetic placement. It is important to recognize that as a result of improvements in design as well as better surgical technique, that all of the risks listed below are less than they were even 10 years ago. It is also important to recognize that most of the available studies report on historical data for devices which are now either not manufactured or have undergone structural improvements to reduce those side effects. The complications are simply noted in random order and do not reflect their frequency.    1. Prosthesis mechanical failure occurs as a result of leakage of fluid from the inflatable types of devices. This typically occurs as a result of a fracture in the tubing, most commonly as it emerges out of the scrotal pump, but it can also be due to a leak from the erectile cylinders in the penis. It is felt that this occurs as a result of repetitive mechanical trauma, which weakens the tubing and causes it to crack. When the fluid leaks, it is not something you would be aware of. It does not cause pain. The fluid contained within the device is typically a sterile saline solution that will simply be reabsorbed by the body. What you will recognize is that when you squeeze the pump, there will be no transfer of fluid and no rigidity or inadequate rigidity. The most recent long-term data looking at 5-10 year success reports mechanical failure in the 5-10% range over that period of time. Looked at another way, 90-95% of men will have a functional prosthesis in 10 years. These devices are designed to be used for life. Each company has its own warrantee which you should discuss with your physician.    2. Infection is a disastrous complication which usually requires the removal of the  "prosthesis, as it is rare to be able to clear infection so long as the prosthesis is within the body. Occasionally, the infected prosthesis can be removed, the location of the device can be washed out vigorously with a special antibiotic salvage procedure and then a new device may be able to be immediately placed. When this is not possible, the infected device is removed and then a period of healing will follow where the device can be replaced after a period of healing (6 weeks to 6 months). Delayed replacement of a prosthesis after initial removal is a more complicated operation associated with the potential for not being able to replace the device because of scarring but other problems such as shortening of the penis or change in sensation and shape may also occur.    As a result of improved surgical technique and device design, infection rates are now markedly reduced, typically being reported in the <1-2% range for new prosthesis placements and up to 3% when the prosthesis is uninfected and has mechanically failed.    3. Bleeding and hematoma are rarely a problem. There is likely to be localized swelling as well as "black and blue" of the penis, groin area, and scrotal sack. These will resolve without treatment over 1-3 weeks. Rarely a scrotal hematoma (collection of blood) will develop which can be treated with rest; it will reabsorb with time or, if it is growing in size or painful, it may need to be evacuated surgically (opened up and washed out). The risk of needing a blood transfusion after penile prosthesis placement is extremely rare to nonexistent.    4. Post-operative pain is variable and can be minimal, but in most men it is quite significant. Your physician will provide you with adequate pain medication to help control the pain, but not necessarily eliminate it entirely. As the prosthesis heals, there will be complete resolution of the pain, such that you will typically not even be aware that the " prosthesis is within your body unless you were to touch it directly. Complete pain resolution will most commonly occur within 4-12 weeks postoperatively. Post-operative pain is typically managed with oral narcotic agents. You should be aware that these drugs can cause constipation as well as sleepiness; therefore, you should not be in any position where you might need to make important decision or driving until the pain is under better control without the need for narcotic pain killers. It is recommended that during the first several days after the operation, that you spend as little time as possible on your feet, as this will encourage healing, reduce swelling, and result in more rapid resolution of pain.    5. Loss of length -  Penile shortening is probably the reason that most men are disappointed with the outcome from their prosthesis surgery. Studies have shown that when the flaccid penile stretched length is measured preoperatively, that the actual loss of length following placement of the prosthesis is on average no more than one centimeter (1/3 inch). To reduce the likelihood of loss of length, the surgeon will do his best to place the proper size device that fits your penis. You can expect that the length of your penis will be much like what you see when you grab the head of the penis and pull it straight out away from your body. Many men who believe they have lost length in their penis following prosthesis surgery in fact did not really lose length because of the surgery, but rather because they have not had had a full erection for some time during which there may have been some loss of tissue elasticity and thereby shortening of the penis. In addition, they may have gained some weight in the pubic area which will cover the penis and make it look shorter. Lastly, they may be expecting that the postoperative result will be like the erections they had when they were a much younger man. Although the goal is  to make the penis as long as possible, one can expect that the rigidity of the penis will be much like the erections obtained as a younger man.    6. Decreased girth - Girth is typically not substantially changed as most cylinders can expand and fill the penis satisfactorily, but if there has been scarring within the tissues of the penis, this can prevent expansion and result in a narrower appearing penis. The surgeon will do his best to put the largest cylinder in the penis, but there are limitations to which the tissues can expand. Decreased girth is not a common complaint.    7. Sensory loss - By and large the surgical techniques being used to avoid injury to the sensory nerves of the penis. Therefore, there is rarely any significant change in the sexual sensation of the penis. Some men find that it takes longer for them to experience orgasm. This may occur because they can simply inflate the penis without any sexuall arousal, and then it will seem to take longer for them to become aroused, resulting in the prolonged time to orgasm. Therefore, proper sexual stimulation is important to enjoy the entire sexual experience with a prosthesis.    8. Change in shape - The overall shape or configuration of the penis is rarely altered as a result of placement of any type of prosthesis, but if there is some unidentified scarring involving the penis such as that which occurs with Peyronie's disease, some curvature or indentations including hourglass narrowing can be identified along the shaft. Typically, in the postoperative period, the prosthesis will cause an internal tissue expansion which will correct these deformities. This may take 6-9 months occur.    9. Erosion or cylinder extrusion - If the tissues in the tips of the penis are weakened either by previous internal penile disease or as a result of the surgery, the prosthetic cylinder may migrate distally into the head of the penis and may appear to be ready to poke  "through the skin or the urethra. By all means, if such an irregularity is seen, one should address it with your doctor before the prosthesis is exposed so that it can be corrected without developing infection. This problem occurs in <1% of cases.    10. Pump problems - These include difficulty in activating or deactivating the pump as well as change of pump location due to migration or fixation of the pump to the scrotal skin. These problems are also quite unusual but can happen as a result of an altered healing process or a malposition of the pump by the implanting surgeon. If the pump were to migrate or become fixed or difficult to manipulate because of its position, a simple outpatient scrotal procedure can be performed to reposition the pump which is almost universally successful. This procedure is performed in no more than 1% of cases.    Prosthesis care - In the postoperative period, it is best to take the antibiotics prescribed by your physician, reduce your physical activity to reduce swelling and enhance healing, take pain medicine for your comfort, and avoid submergingthe incision during bathing for a minimum of 1-4 weeks. Specific bathing instructions will be issued by your physician. It is not uncommon to have an inflatable prosthesis partially fill during the postoperative period involuntarily. This is called "auto-inflation." To prevent this problem, it is important that once the prosthesis is activated, which is typically 4-6 weeks after surgery, that you perform what is known as "cycling" of the device. Cycling means complete inflation and then complete deflation of the penile cylinders twice per day for one month. In doing this, the tissues around the prosthesis are softened and stretched allowing complete deflation of the device into the reservoir. It also will allow you to become more familiar with operating the device and make it easier for you to activate it quickly and inconspicuously when you " want to engage in sexual relations. If you have an inflatable device with a scrotal pump, it is best to inflate it without twisting it. The repetitive twisting of the pump can weaken the connections between the tubing and the pump and is the most common cause for mechanical failure of the prosthesis.    It is hoped that this review will inform you of the potential problems associated with your prosthesis and as a result, will make for more realistic expectations regarding the outcome.      Copy to:

## 2019-09-18 ENCOUNTER — TELEPHONE (OUTPATIENT)
Dept: DERMATOLOGY | Facility: CLINIC | Age: 64
End: 2019-09-18

## 2019-09-18 DIAGNOSIS — L28.0 LICHEN SIMPLEX CHRONICUS: ICD-10-CM

## 2019-09-18 RX ORDER — HYDROCORTISONE 25 MG/G
CREAM TOPICAL
Qty: 28 G | Refills: 3 | Status: SHIPPED | OUTPATIENT
Start: 2019-09-18 | End: 2020-01-03

## 2019-09-18 NOTE — TELEPHONE ENCOUNTER
----- Message from Clau Estes sent at 9/18/2019  3:20 PM CDT -----  Contact: [facility]  Pharmacy need to know what area hydrocortisone 2.5 % cream is being applied        [facility] - [place] - [address] [place], [place] [address] [id number]  Phone: [phone] Fax: [phone]

## 2019-09-30 ENCOUNTER — PATIENT MESSAGE (OUTPATIENT)
Dept: FAMILY MEDICINE | Facility: CLINIC | Age: 64
End: 2019-09-30

## 2019-10-01 ENCOUNTER — OFFICE VISIT (OUTPATIENT)
Dept: FAMILY MEDICINE | Facility: CLINIC | Age: 64
End: 2019-10-01
Payer: COMMERCIAL

## 2019-10-01 VITALS
HEART RATE: 83 BPM | DIASTOLIC BLOOD PRESSURE: 72 MMHG | BODY MASS INDEX: 29.93 KG/M2 | HEIGHT: 72 IN | WEIGHT: 221 LBS | SYSTOLIC BLOOD PRESSURE: 131 MMHG

## 2019-10-01 DIAGNOSIS — S46.911A STRAIN OF RIGHT SHOULDER, INITIAL ENCOUNTER: Primary | ICD-10-CM

## 2019-10-01 PROCEDURE — 1101F PR PT FALLS ASSESS DOC 0-1 FALLS W/OUT INJ PAST YR: ICD-10-PCS | Mod: CPTII,S$GLB,, | Performed by: FAMILY MEDICINE

## 2019-10-01 PROCEDURE — 99999 PR PBB SHADOW E&M-EST. PATIENT-LVL III: CPT | Mod: PBBFAC,,, | Performed by: FAMILY MEDICINE

## 2019-10-01 PROCEDURE — 1101F PT FALLS ASSESS-DOCD LE1/YR: CPT | Mod: CPTII,S$GLB,, | Performed by: FAMILY MEDICINE

## 2019-10-01 PROCEDURE — 99213 PR OFFICE/OUTPT VISIT, EST, LEVL III, 20-29 MIN: ICD-10-PCS | Mod: S$GLB,,, | Performed by: FAMILY MEDICINE

## 2019-10-01 PROCEDURE — 99999 PR PBB SHADOW E&M-EST. PATIENT-LVL III: ICD-10-PCS | Mod: PBBFAC,,, | Performed by: FAMILY MEDICINE

## 2019-10-01 PROCEDURE — 3008F PR BODY MASS INDEX (BMI) DOCUMENTED: ICD-10-PCS | Mod: CPTII,S$GLB,, | Performed by: FAMILY MEDICINE

## 2019-10-01 PROCEDURE — 3008F BODY MASS INDEX DOCD: CPT | Mod: CPTII,S$GLB,, | Performed by: FAMILY MEDICINE

## 2019-10-01 PROCEDURE — 99213 OFFICE O/P EST LOW 20 MIN: CPT | Mod: S$GLB,,, | Performed by: FAMILY MEDICINE

## 2019-10-01 RX ORDER — METHYLPREDNISOLONE 4 MG/1
TABLET ORAL
Qty: 1 PACKAGE | Refills: 0 | Status: ON HOLD | OUTPATIENT
Start: 2019-10-01 | End: 2020-06-25 | Stop reason: HOSPADM

## 2019-10-01 NOTE — PROGRESS NOTES
The patient presents with tender painful shoulder for the past 3 weeks   with puklling ROS:  General: No fever or sig wt change  HEENT:No other PND eye pain or dc  Respiratory: No cough wheezing  PE: vital signs noted  HEENT: Normocephalic,with no recent trauma,PERRLA,EOMI,conjunctiva injected with no exudate.Nasopharynx is injected and edematous.External otic canal edematous and injected TM dull  Neck:Supple without adenopathy  Chest:Clear bilateral breath sounds    Heart:Regular rhthym without murmer  Abdomen:Soft, non tender,no masses, no hepatosplenomegaly  Extremeties: Tender rt post shoulder   Neurologic:Grossly within normal limits     Impression:Shoulder  Plan:

## 2019-10-16 RX ORDER — MONTELUKAST SODIUM 10 MG/1
TABLET ORAL
Qty: 90 TABLET | Refills: 4 | Status: SHIPPED | OUTPATIENT
Start: 2019-10-16 | End: 2021-07-26

## 2019-10-21 ENCOUNTER — PATIENT MESSAGE (OUTPATIENT)
Dept: FAMILY MEDICINE | Facility: CLINIC | Age: 64
End: 2019-10-21

## 2019-10-21 DIAGNOSIS — M77.40 METATARSALGIA, UNSPECIFIED LATERALITY: Primary | ICD-10-CM

## 2019-10-30 ENCOUNTER — PATIENT MESSAGE (OUTPATIENT)
Dept: FAMILY MEDICINE | Facility: CLINIC | Age: 64
End: 2019-10-30

## 2019-10-30 DIAGNOSIS — S43.401A SPRAIN OF RIGHT SHOULDER, UNSPECIFIED SHOULDER SPRAIN TYPE, INITIAL ENCOUNTER: Primary | ICD-10-CM

## 2019-11-06 ENCOUNTER — TELEPHONE (OUTPATIENT)
Dept: FAMILY MEDICINE | Facility: CLINIC | Age: 64
End: 2019-11-06

## 2019-11-06 ENCOUNTER — CLINICAL SUPPORT (OUTPATIENT)
Dept: FAMILY MEDICINE | Facility: CLINIC | Age: 64
End: 2019-11-06
Payer: COMMERCIAL

## 2019-11-06 DIAGNOSIS — J30.89 NON-SEASONAL ALLERGIC RHINITIS, UNSPECIFIED TRIGGER: Primary | ICD-10-CM

## 2019-11-06 PROCEDURE — 99999 PR PBB SHADOW E&M-EST. PATIENT-LVL III: ICD-10-PCS | Mod: PBBFAC,,,

## 2019-11-06 PROCEDURE — 96372 THER/PROPH/DIAG INJ SC/IM: CPT | Mod: S$GLB,,, | Performed by: FAMILY MEDICINE

## 2019-11-06 PROCEDURE — 96372 PR INJECTION,THERAP/PROPH/DIAG2ST, IM OR SUBCUT: ICD-10-PCS | Mod: S$GLB,,, | Performed by: FAMILY MEDICINE

## 2019-11-06 PROCEDURE — 99999 PR PBB SHADOW E&M-EST. PATIENT-LVL III: CPT | Mod: PBBFAC,,,

## 2019-11-06 RX ORDER — DEXAMETHASONE SODIUM PHOSPHATE 4 MG/ML
8 INJECTION, SOLUTION INTRA-ARTICULAR; INTRALESIONAL; INTRAMUSCULAR; INTRAVENOUS; SOFT TISSUE ONCE
Status: COMPLETED | OUTPATIENT
Start: 2019-11-06 | End: 2019-11-06

## 2019-11-06 RX ADMIN — DEXAMETHASONE SODIUM PHOSPHATE 8 MG: 4 INJECTION, SOLUTION INTRA-ARTICULAR; INTRALESIONAL; INTRAMUSCULAR; INTRAVENOUS; SOFT TISSUE at 09:11

## 2019-11-06 NOTE — TELEPHONE ENCOUNTER
----- Message from Loli Leonard MA sent at 11/6/2019  9:17 AM CST -----  Pt is requesting a deca inj for sinus infection. Pt would like to come in today.

## 2019-12-06 ENCOUNTER — PATIENT MESSAGE (OUTPATIENT)
Dept: FAMILY MEDICINE | Facility: CLINIC | Age: 64
End: 2019-12-06

## 2020-01-03 DIAGNOSIS — L28.0 LICHEN SIMPLEX CHRONICUS: ICD-10-CM

## 2020-01-03 RX ORDER — HYDROCORTISONE 25 MG/G
CREAM TOPICAL
Qty: 30 G | Refills: 3 | Status: SHIPPED | OUTPATIENT
Start: 2020-01-03 | End: 2021-07-28

## 2020-02-27 ENCOUNTER — PATIENT MESSAGE (OUTPATIENT)
Dept: FAMILY MEDICINE | Facility: CLINIC | Age: 65
End: 2020-02-27

## 2020-04-21 ENCOUNTER — OFFICE VISIT (OUTPATIENT)
Dept: FAMILY MEDICINE | Facility: CLINIC | Age: 65
End: 2020-04-21
Payer: COMMERCIAL

## 2020-04-21 ENCOUNTER — PATIENT MESSAGE (OUTPATIENT)
Dept: FAMILY MEDICINE | Facility: CLINIC | Age: 65
End: 2020-04-21

## 2020-04-21 ENCOUNTER — TELEPHONE (OUTPATIENT)
Dept: FAMILY MEDICINE | Facility: CLINIC | Age: 65
End: 2020-04-21

## 2020-04-21 DIAGNOSIS — M79.673 PAIN OF FOOT, UNSPECIFIED LATERALITY: Primary | ICD-10-CM

## 2020-04-21 PROCEDURE — 99213 OFFICE O/P EST LOW 20 MIN: CPT | Mod: 95,,, | Performed by: FAMILY MEDICINE

## 2020-04-21 PROCEDURE — 1101F PT FALLS ASSESS-DOCD LE1/YR: CPT | Mod: CPTII,,, | Performed by: FAMILY MEDICINE

## 2020-04-21 PROCEDURE — 99213 PR OFFICE/OUTPT VISIT, EST, LEVL III, 20-29 MIN: ICD-10-PCS | Mod: 95,,, | Performed by: FAMILY MEDICINE

## 2020-04-21 PROCEDURE — 1101F PR PT FALLS ASSESS DOC 0-1 FALLS W/OUT INJ PAST YR: ICD-10-PCS | Mod: CPTII,,, | Performed by: FAMILY MEDICINE

## 2020-04-21 RX ORDER — HYDROXYZINE HYDROCHLORIDE 25 MG/1
TABLET, FILM COATED ORAL
Qty: 60 TABLET | Refills: 11 | Status: SHIPPED | OUTPATIENT
Start: 2020-04-21 | End: 2022-03-01 | Stop reason: SDUPTHER

## 2020-04-21 RX ORDER — MELOXICAM 15 MG/1
15 TABLET ORAL DAILY
Qty: 30 TABLET | Refills: 1 | Status: ON HOLD | OUTPATIENT
Start: 2020-04-21 | End: 2020-06-25 | Stop reason: HOSPADM

## 2020-04-21 NOTE — PROGRESS NOTES
The patient location is: Home  The chief complaint leading to consultation is:foot painVisit type: Virtual visit with synchronous audio and video  Total time spent with patient: 10 minutes  Each patient to whom he or she provides medical services by telemedicine is:  (1) informed of the relationship between the physician and patient and the respective role of any other health care provider with respect to management of the patient; and (2) notified that he or she may decline to receive medical services by telemedicine and may withdraw from such care at any time.    Notes:   Blayne Mendoza presents with moderate full feeling for sevral months recent tingling in front of foot    Past Medical History:   Diagnosis Date    Allergy     Basal cell carcinoma     Cancer     Prostate    Colon polyp      Past Surgical History:   Procedure Laterality Date    COLONOSCOPY N/A 2015    Procedure: COLONOSCOPY;  Surgeon: Rod Rayo MD;  Location: [facility];  Service: Endoscopy;  Laterality: N/A;    PROSTATE SURGERY  2008    radical prostatectomy    VASECTOMY       Review of patient's allergies indicates:   Allergen Reactions    Penicillins      Other reaction(s): Swelling    Sulfa (sulfonamide antibiotics)      Other reaction(s): Rash    Bactrim  [sulfamethoxazole-trimethoprim] Rash       Social History     Socioeconomic History    Marital status:      Spouse name: Not on file    Number of children: Not on file    Years of education: Not on file    Highest education level: Not on file   Occupational History    Not on file   Social Needs    Financial resource strain: Not on file    Food insecurity:     Worry: Not on file     Inability: Not on file    Transportation needs:     Medical: Not on file     Non-medical: Not on file   Tobacco Use    Smoking status: Former Smoker     Packs/day: 0.00     Types: Cigars     Last attempt to quit: 1982     Years since quittin.4    Smokeless tobacco:  Never Used   Substance and Sexual Activity    Alcohol use: Yes     Alcohol/week: 11.7 standard drinks     Types: 14 Standard drinks or equivalent per week    Drug use: No    Sexual activity: Yes     Partners: Female   Lifestyle    Physical activity:     Days per week: Not on file     Minutes per session: Not on file    Stress: Not on file   Relationships    Social connections:     Talks on phone: Not on file     Gets together: Not on file     Attends Roman [employer] service: Not on file     Active member of club or organization: Not on file     Attends meetings of clubs or organizations: Not on file     Relationship status: Not on file   Other Topics Concern    Not on file   Social History Narrative    Not on file     No family history on file.      ROS:  SKIN: No rashes, itching or changes in color or texture of skin.  EYES: Visual acuity fine. No photophobia, ocular pain or diplopia.EARS: Denies ear pain, discharge or vertigo.NOSE: No loss of smell, no epistaxis some postnasal drip.MOUTH & THROAT: No hoarseness or change in voice. No excessive gum bleeding.CHEST: Denies SHAW, cyanosis, wheezing  CARDIOVASCULAR: Denies chest pain, PND, orthopnea or reduced exercise tolerance.  ABDOMEN:  No weight loss.No abdominal pain, no hematemesis or blood in stool.  URINARY: No flank pain, dysuria or hematuria.  PERIPHERAL VASCULAR: No claudication or cyanosis.  MUSCULOSKELETAL: Negative   NEUROLOGIC: No history of seizures, paralysis, alteration of gait or coordination.    PE: Heent:Normocephalic with no recent cranial trauma,PERRLA,EOMI,conjunctiva clear, Chest:No tachypnea. No wheezing, rhonchi on forced expiration   Ext: Area of discomfort non tender  Impression:MTP inflammation vs neuroma  Plan: Arch support meloxicam and if ftp Pod con

## 2020-05-16 ENCOUNTER — PATIENT MESSAGE (OUTPATIENT)
Dept: UROLOGY | Facility: CLINIC | Age: 65
End: 2020-05-16

## 2020-05-19 ENCOUNTER — OFFICE VISIT (OUTPATIENT)
Dept: FAMILY MEDICINE | Facility: CLINIC | Age: 65
End: 2020-05-19
Payer: COMMERCIAL

## 2020-05-19 DIAGNOSIS — K29.70 GASTRITIS, PRESENCE OF BLEEDING UNSPECIFIED, UNSPECIFIED CHRONICITY, UNSPECIFIED GASTRITIS TYPE: Primary | ICD-10-CM

## 2020-05-19 DIAGNOSIS — K63.5 POLYP OF COLON, UNSPECIFIED PART OF COLON, UNSPECIFIED TYPE: ICD-10-CM

## 2020-05-19 DIAGNOSIS — K62.5 RECTAL BLEEDING: ICD-10-CM

## 2020-05-19 PROCEDURE — 1101F PT FALLS ASSESS-DOCD LE1/YR: CPT | Mod: CPTII,,, | Performed by: FAMILY MEDICINE

## 2020-05-19 PROCEDURE — 1101F PR PT FALLS ASSESS DOC 0-1 FALLS W/OUT INJ PAST YR: ICD-10-PCS | Mod: CPTII,,, | Performed by: FAMILY MEDICINE

## 2020-05-19 PROCEDURE — 99214 PR OFFICE/OUTPT VISIT, EST, LEVL IV, 30-39 MIN: ICD-10-PCS | Mod: 95,,, | Performed by: FAMILY MEDICINE

## 2020-05-19 PROCEDURE — 99214 OFFICE O/P EST MOD 30 MIN: CPT | Mod: 95,,, | Performed by: FAMILY MEDICINE

## 2020-05-19 RX ORDER — PANTOPRAZOLE SODIUM 40 MG/1
40 TABLET, DELAYED RELEASE ORAL DAILY
Qty: 90 TABLET | Refills: 3 | Status: SHIPPED | OUTPATIENT
Start: 2020-05-19 | End: 2021-07-28

## 2020-05-19 NOTE — PROGRESS NOTES
The patient location is: Home  The chief complaint leading to consultation is:Upper rsp congestion   Visit type: Virtual visit with synchronous audio and video  Total time spent with patient: 20 minutes  Each patient to whom he or she provides medical services by telemedicine is:  (1) informed of the relationship between the physician and patient and the respective role of any other health care provider with respect to management of the patient; and (2) notified that he or she may decline to receive medical services by telemedicine and may withdraw from such care at any time.    Notes:   Blayne Mendoza presents with moderate upper abdominal pain incr belching,no reflux  past 2-3 weeks. No dyspnea Denies nausea,vomiting,diarrhea or fever.Also small amount brb per rectum and suspicious for int hem. Hx serrated cecal polyp 12/15    Past Medical History:   Diagnosis Date    Allergy     Basal cell carcinoma     Cancer     Prostate    Colon polyp      Past Surgical History:   Procedure Laterality Date    COLONOSCOPY N/A 12/1/2015    Procedure: COLONOSCOPY;  Surgeon: Rod Rayo MD;  Location: [facility];  Service: Endoscopy;  Laterality: N/A;    PROSTATE SURGERY  2008    radical prostatectomy    VASECTOMY       Review of patient's allergies indicates:   Allergen Reactions    Penicillins      Other reaction(s): Swelling    Sulfa (sulfonamide antibiotics)      Other reaction(s): Rash    Bactrim  [sulfamethoxazole-trimethoprim] Rash       Social History     Socioeconomic History    Marital status:      Spouse name: Not on file    Number of children: Not on file    Years of education: Not on file    Highest education level: Not on file   Occupational History    Not on file   Social Needs    Financial resource strain: Not on file    Food insecurity:     Worry: Not on file     Inability: Not on file    Transportation needs:     Medical: Not on file     Non-medical: Not on file   Tobacco Use     Smoking status: Former Smoker     Packs/day: 0.00     Types: Cigars     Last attempt to quit: 1982     Years since quittin.4    Smokeless tobacco: Never Used   Substance and Sexual Activity    Alcohol use: Yes     Alcohol/week: 11.7 standard drinks     Types: 14 Standard drinks or equivalent per week    Drug use: No    Sexual activity: Yes     Partners: Female   Lifestyle    Physical activity:     Days per week: Not on file     Minutes per session: Not on file    Stress: Not on file   Relationships    Social connections:     Talks on phone: Not on file     Gets together: Not on file     Attends Confucianist service: Not on file     Active member of club or organization: Not on file     Attends meetings of clubs or organizations: Not on file     Relationship status: Not on file   Other Topics Concern    Not on file   Social History Narrative    Not on file     No family history on file.      ROS:  SKIN: No rashes, itching or changes in color or texture of skin.  EYES: Visual acuity fine. No photophobia, ocular pain or diplopia.EARS: Denies ear pain, discharge or vertigo.NOSE: No loss of smell, no epistaxis some postnasal drip.MOUTH & THROAT: No hoarseness or change in voice. No excessive gum bleeding.CHEST: Denies SHAW, cyanosis, wheezing  CARDIOVASCULAR: Denies chest pain, PND, orthopnea or reduced exercise tolerance.  ABDOMEN:  No weight loss.No abdominal pain, no hematemesis or blood in stool.  URINARY: No flank pain, dysuria or hematuria.  PERIPHERAL VASCULAR: No claudication or cyanosis.  MUSCULOSKELETAL: Negative   NEUROLOGIC: No history of seizures, paralysis, alteration of gait or coordination.    PE: Heent:Normocephalic with no recent cranial trauma,PERRLA,EOMI,conjunctiva clear,Nasal mucosa slightly red and edematous.Posterior pharynx slightly red but without exudate.  Chest:No tachypnea. No wheezing, rhonchi on forced expiration  Abdomen:Soft, non tender to patient palpation  Impression:   Gastritis  Rectal bleeding  Hx serrated polyp 12/2015  Plan: DC zantac-if needed as H2 antagonist rec pepcd  Pantoprazole  Reduce cffeine  If rectal bleeding pesists rec c scope now otherwise will be due in Dec     Answers for HPI/ROS submitted by the patient on 5/19/2020   Abdominal pain  Chronicity: new  Onset: 1 to 4 weeks ago  Onset quality: gradual  Frequency: intermittently  Episode duration: 2 hours  Progression since onset: gradually worsening  Pain location: epigastric region, right flank  Pain quality: a sensation of fullness  Radiates to: periumbilical region  anorexia: No  arthralgias: No  belching: Yes  constipation: No  diarrhea: No  dysuria: No  fever: No  flatus: Yes  headaches: Yes  hematochezia: Yes  hematuria: No  melena: No  myalgias: No  nausea: Yes  weight loss: No  vomiting: No  Aggravated by: drinking alcohol  Relieved by: bowel movements  Pain severity: mild  Treatments tried: nothing  abdominal surgery: No  colon cancer: No  Crohn's disease: No  gallstones: No  GERD: No  irritable bowel syndrome: No  kidney stones: No  pancreatitis: No  PUD: No  ulcerative colitis: No  UTI: Yes

## 2020-05-20 RX ORDER — DICYCLOMINE HYDROCHLORIDE 10 MG/1
10 CAPSULE ORAL
Qty: 60 CAPSULE | Refills: 1 | Status: SHIPPED | OUTPATIENT
Start: 2020-05-20 | End: 2020-06-19

## 2020-05-30 ENCOUNTER — PATIENT MESSAGE (OUTPATIENT)
Dept: FAMILY MEDICINE | Facility: CLINIC | Age: 65
End: 2020-05-30

## 2020-06-01 NOTE — TELEPHONE ENCOUNTER
OK schedule EGD and colonoscopy dx rectal bleeding abdominal pain and history of serrated polyp-Dr GALLAGHER

## 2020-06-17 ENCOUNTER — TELEPHONE (OUTPATIENT)
Dept: ENDOSCOPY | Facility: HOSPITAL | Age: 65
End: 2020-06-17

## 2020-06-23 ENCOUNTER — LAB VISIT (OUTPATIENT)
Dept: LAB | Facility: HOSPITAL | Age: 65
End: 2020-06-23
Attending: FAMILY MEDICINE
Payer: COMMERCIAL

## 2020-06-23 ENCOUNTER — CLINICAL SUPPORT (OUTPATIENT)
Dept: FAMILY MEDICINE | Facility: CLINIC | Age: 65
End: 2020-06-23
Payer: COMMERCIAL

## 2020-06-23 DIAGNOSIS — K21.9 GASTROESOPHAGEAL REFLUX DISEASE, ESOPHAGITIS PRESENCE NOT SPECIFIED: ICD-10-CM

## 2020-06-23 DIAGNOSIS — K62.5 RECTAL BLEEDING: Primary | ICD-10-CM

## 2020-06-23 DIAGNOSIS — Z00.00 ROUTINE HEALTH MAINTENANCE: ICD-10-CM

## 2020-06-23 PROCEDURE — 36415 COLL VENOUS BLD VENIPUNCTURE: CPT | Mod: PO

## 2020-06-23 PROCEDURE — U0003 INFECTIOUS AGENT DETECTION BY NUCLEIC ACID (DNA OR RNA); SEVERE ACUTE RESPIRATORY SYNDROME CORONAVIRUS 2 (SARS-COV-2) (CORONAVIRUS DISEASE [COVID-19]), AMPLIFIED PROBE TECHNIQUE, MAKING USE OF HIGH THROUGHPUT TECHNOLOGIES AS DESCRIBED BY CMS-2020-01-R: HCPCS

## 2020-06-23 PROCEDURE — 80061 LIPID PANEL: CPT

## 2020-06-24 LAB
CHOLEST SERPL-MCNC: 209 MG/DL (ref 120–199)
CHOLEST/HDLC SERPL: 4.8 {RATIO} (ref 2–5)
HDLC SERPL-MCNC: 44 MG/DL (ref 40–75)
HDLC SERPL: 21.1 % (ref 20–50)
LDLC SERPL CALC-MCNC: 138.2 MG/DL (ref 63–159)
NONHDLC SERPL-MCNC: 165 MG/DL
SARS-COV-2 RNA RESP QL NAA+PROBE: NOT DETECTED
TRIGL SERPL-MCNC: 134 MG/DL (ref 30–150)

## 2020-06-25 ENCOUNTER — HOSPITAL ENCOUNTER (OUTPATIENT)
Facility: HOSPITAL | Age: 65
Discharge: HOME | End: 2020-06-25
Attending: FAMILY MEDICINE | Admitting: FAMILY MEDICINE
Payer: COMMERCIAL

## 2020-06-25 ENCOUNTER — ANESTHESIA EVENT (OUTPATIENT)
Dept: ENDOSCOPY | Facility: HOSPITAL | Age: 65
End: 2020-06-25
Payer: COMMERCIAL

## 2020-06-25 ENCOUNTER — ANESTHESIA (OUTPATIENT)
Dept: ENDOSCOPY | Facility: HOSPITAL | Age: 65
End: 2020-06-25
Payer: COMMERCIAL

## 2020-06-25 VITALS
WEIGHT: 220.44 LBS | BODY MASS INDEX: 29.86 KG/M2 | TEMPERATURE: 98 F | HEIGHT: 72 IN | RESPIRATION RATE: 15 BRPM | OXYGEN SATURATION: 97 % | SYSTOLIC BLOOD PRESSURE: 118 MMHG | DIASTOLIC BLOOD PRESSURE: 58 MMHG | HEART RATE: 61 BPM

## 2020-06-25 DIAGNOSIS — K62.5 RECTAL BLEEDING: ICD-10-CM

## 2020-06-25 DIAGNOSIS — R10.13 EPIGASTRIC PAIN: Primary | ICD-10-CM

## 2020-06-25 DIAGNOSIS — K63.5 POLYP OF COLON, UNSPECIFIED PART OF COLON, UNSPECIFIED TYPE: ICD-10-CM

## 2020-06-25 DIAGNOSIS — K52.9 COLITIS: ICD-10-CM

## 2020-06-25 DIAGNOSIS — K29.60 EROSIVE GASTRITIS: ICD-10-CM

## 2020-06-25 DIAGNOSIS — K57.30 DIVERTICULOSIS OF LARGE INTESTINE WITHOUT HEMORRHAGE: ICD-10-CM

## 2020-06-25 PROBLEM — K21.9 GERD (GASTROESOPHAGEAL REFLUX DISEASE): Status: ACTIVE | Noted: 2020-06-25

## 2020-06-25 PROCEDURE — 37000008 HC ANESTHESIA 1ST 15 MINUTES: Performed by: FAMILY MEDICINE

## 2020-06-25 PROCEDURE — 88342 IMHCHEM/IMCYTCHM 1ST ANTB: CPT | Mod: 26,,, | Performed by: PATHOLOGY

## 2020-06-25 PROCEDURE — 27201012 HC FORCEPS, HOT/COLD, DISP: Performed by: FAMILY MEDICINE

## 2020-06-25 PROCEDURE — 45380 COLONOSCOPY AND BIOPSY: CPT | Performed by: FAMILY MEDICINE

## 2020-06-25 PROCEDURE — 63600175 PHARM REV CODE 636 W HCPCS: Performed by: NURSE ANESTHETIST, CERTIFIED REGISTERED

## 2020-06-25 PROCEDURE — 88342 CHG IMMUNOCYTOCHEMISTRY: ICD-10-PCS | Mod: 26,,, | Performed by: PATHOLOGY

## 2020-06-25 PROCEDURE — 88342 IMHCHEM/IMCYTCHM 1ST ANTB: CPT | Performed by: PATHOLOGY

## 2020-06-25 PROCEDURE — 88305 TISSUE EXAM BY PATHOLOGIST: CPT | Mod: 59 | Performed by: PATHOLOGY

## 2020-06-25 PROCEDURE — 43239 EGD BIOPSY SINGLE/MULTIPLE: CPT | Mod: 51,,, | Performed by: FAMILY MEDICINE

## 2020-06-25 PROCEDURE — 88305 TISSUE EXAM BY PATHOLOGIST: CPT | Mod: 26,,, | Performed by: PATHOLOGY

## 2020-06-25 PROCEDURE — 43239 PR EGD, FLEX, W/BIOPSY, SGL/MULTI: ICD-10-PCS | Mod: 51,,, | Performed by: FAMILY MEDICINE

## 2020-06-25 PROCEDURE — 45380 COLONOSCOPY AND BIOPSY: CPT | Mod: ,,, | Performed by: FAMILY MEDICINE

## 2020-06-25 PROCEDURE — 37000009 HC ANESTHESIA EA ADD 15 MINS: Performed by: FAMILY MEDICINE

## 2020-06-25 PROCEDURE — 25000003 PHARM REV CODE 250: Performed by: NURSE ANESTHETIST, CERTIFIED REGISTERED

## 2020-06-25 PROCEDURE — 88305 TISSUE EXAM BY PATHOLOGIST: ICD-10-PCS | Mod: 26,,, | Performed by: PATHOLOGY

## 2020-06-25 PROCEDURE — 45380 PR COLONOSCOPY,BIOPSY: ICD-10-PCS | Mod: ,,, | Performed by: FAMILY MEDICINE

## 2020-06-25 PROCEDURE — 43239 EGD BIOPSY SINGLE/MULTIPLE: CPT | Performed by: FAMILY MEDICINE

## 2020-06-25 RX ORDER — LIDOCAINE HYDROCHLORIDE 10 MG/ML
INJECTION, SOLUTION EPIDURAL; INFILTRATION; INTRACAUDAL; PERINEURAL
Status: DISCONTINUED | OUTPATIENT
Start: 2020-06-25 | End: 2020-06-25

## 2020-06-25 RX ORDER — SODIUM CHLORIDE, SODIUM LACTATE, POTASSIUM CHLORIDE, CALCIUM CHLORIDE 600; 310; 30; 20 MG/100ML; MG/100ML; MG/100ML; MG/100ML
INJECTION, SOLUTION INTRAVENOUS CONTINUOUS PRN
Status: DISCONTINUED | OUTPATIENT
Start: 2020-06-25 | End: 2020-06-25

## 2020-06-25 RX ORDER — PROPOFOL 10 MG/ML
VIAL (ML) INTRAVENOUS
Status: DISCONTINUED | OUTPATIENT
Start: 2020-06-25 | End: 2020-06-25

## 2020-06-25 RX ORDER — SUCRALFATE 1 G/1
1 TABLET ORAL 4 TIMES DAILY
Qty: 120 TABLET | Refills: 1 | Status: SHIPPED | OUTPATIENT
Start: 2020-06-25 | End: 2020-08-24

## 2020-06-25 RX ORDER — SODIUM CHLORIDE 0.9 % (FLUSH) 0.9 %
10 SYRINGE (ML) INJECTION
Status: DISCONTINUED | OUTPATIENT
Start: 2020-06-25 | End: 2021-07-28

## 2020-06-25 RX ADMIN — SODIUM CHLORIDE, SODIUM LACTATE, POTASSIUM CHLORIDE, AND CALCIUM CHLORIDE: .6; .31; .03; .02 INJECTION, SOLUTION INTRAVENOUS at 01:06

## 2020-06-25 RX ADMIN — PROPOFOL 50 MG: 10 INJECTION, EMULSION INTRAVENOUS at 01:06

## 2020-06-25 RX ADMIN — PROPOFOL 150 MG: 10 INJECTION, EMULSION INTRAVENOUS at 01:06

## 2020-06-25 RX ADMIN — LIDOCAINE HYDROCHLORIDE 50 MG: 10 INJECTION, SOLUTION EPIDURAL; INFILTRATION; INTRACAUDAL; PERINEURAL at 01:06

## 2020-06-25 NOTE — ANESTHESIA POSTPROCEDURE EVALUATION
Anesthesia Post Evaluation    Patient: Blayne PRIYANKA Reji    Procedure(s) Performed: Procedure(s) (LRB):  ESOPHAGOGASTRODUODENOSCOPY (EGD) (N/A)  COLONOSCOPY (N/A)    Final Anesthesia Type: MAC    Patient location during evaluation: GI PACU  Patient participation: Yes- Able to Participate  Level of consciousness: awake and alert and oriented  Post-procedure vital signs: reviewed and stable  Pain management: adequate  Airway patency: patent    PONV status at discharge: No PONV  Anesthetic complications: no      Cardiovascular status: blood pressure returned to baseline and hemodynamically stable  Respiratory status: unassisted, room air and spontaneous ventilation  Hydration status: euvolemic  Follow-up not needed.          Vitals Value Taken Time   BP  06/25/20 1353   Temp  06/25/20 1353   Pulse  06/25/20 1353   Resp  06/25/20 1353   SpO2  06/25/20 1353         No case tracking events are documented in the log.      Pain/Maryellen Score: No data recorded

## 2020-06-25 NOTE — PROVATION PATIENT INSTRUCTIONS
Discharge Summary/Instructions after an Endoscopic Procedure  Patient Name: Blayne Mendoza  Patient MRN: [id number]  Patient YOB: 1955 Thursday, June 25, 2020 Rod Rayo MD  RESTRICTIONS:  During your procedure today, you received medications for sedation.  These   medications may affect your judgment, balance and coordination.  Therefore,   for 24 hours, you have the following restrictions:   - DO NOT drive a car, operate machinery, make legal/financial decisions,   sign important papers or drink alcohol.    ACTIVITY:  Today: no heavy lifting, straining or running due to procedural   sedation/anesthesia.  The following day: return to full activity including work.  DIET:  Eat and drink normally unless instructed otherwise.     TREATMENT FOR COMMON SIDE EFFECTS:  - Mild abdominal pain, nausea, belching, bloating or excessive gas:  rest,   eat lightly and use a heating pad.  - Sore Throat: treat with throat lozenges and/or gargle with warm salt   water.  - Because air was used during the procedure, expelling large amounts of air   from your rectum or belching is normal.  - If a bowel prep was taken, you may not have a bowel movement for 1-3 days.    This is normal.  SYMPTOMS TO WATCH FOR AND REPORT TO YOUR PHYSICIAN:  1. Abdominal pain or bloating, other than gas cramps.  2. Chest pain.  3. Back pain.  4. Signs of infection such as: chills or fever occurring within 24 hours   after the procedure.  5. Rectal bleeding, which would show as bright red, maroon, or black stools.   (A tablespoon of blood from the rectum is not serious, especially if   hemorrhoids are present.)  6. Vomiting.  7. Weakness or dizziness.  GO DIRECTLY TO THE NEAREST EMERGENCY ROOM IF YOU HAVE ANY OF THE FOLLOWING:      Difficulty breathing              Chills and/or fever over 101 F   Persistent vomiting and/or vomiting blood   Severe abdominal pain   Severe chest pain   Black, tarry stools   Bleeding- more than one  tablespoon   Any other symptom or condition that you feel may need urgent attention  Your doctor recommends these additional instructions:  If any biopsies were taken, your doctors clinic will contact you in 1 to 2   weeks with any results.  - Patient has a contact number available for emergencies.  The signs and   symptoms of potential delayed complications were discussed with the   patient.  Return to normal activities tomorrow.  Written discharge   instructions were provided to the patient.   - Resume previous diet.   - Continue present medications.   - No aspirin, ibuprofen, naproxen, or other non-steroidal anti-inflammatory   drugs.   - Return to my office in 2 weeks.   - Repeat colonoscopy in 5 years for surveillance.   - Discharge patient to home (via wheelchair).  For questions, problems or results please call your physician Rod Rayo MD at Work:  [phone]  If you have any questions about the above instructions, call the GI   department at [phone] or call the endoscopy unit at [phone]   from 7am until 3 pm.  [facility] [place], EMERGENCY ROOM PHONE NUMBER:   [phone]  IF A COMPLICATION OR EMERGENCY SITUATION ARISES AND YOU ARE UNABLE TO REACH   YOUR PHYSICIAN - GO DIRECTLY TO THE EMERGENCY ROOM.  I have read or have had read to me these discharge instructions for my   procedure and have received a written copy.  I understand these   instructions and will follow-up with my physician if I have any questions.     __________________________________       _____________________________________  Nurse Signature                                          Patient/Designated   Responsible Party Signature  Rod Rayo MD  6/25/2020 1:59:57 PM  This report has been verified and signed electronically.  [employer]ATION

## 2020-06-25 NOTE — H&P
Short Stay Endoscopy History and Physical    PCP - Christopher Méndez MD    Procedure - EGD and colonoscopy  ASA - 2  Mallampati - per anesthesia  History of Anesthesia problems - no  Family history Anesthesia problems -  no     HPI:  This is a 64 y.o. male here for evaluation of :   Active Hospital Problems    Diagnosis  POA    *Epigastric pain [R10.13]  Yes    Rectal bleeding [K62.5]  Yes      Resolved Hospital Problems   No resolved problems to display.         Health Maintenance       Date Due Completion Date    HIV Screening 09/27/1969 ---    Shingles Vaccine (1 of 2) 09/27/2004 ---    Pneumococcal Vaccine (65+ High/Highest Risk) (2 of 2 - PPSV23) 09/27/2019 2/4/2019    Abdominal Aortic Aneurysm Screening 09/27/2019 ---    Colorectal Cancer Screening 12/01/2020 12/1/2015    Override on 2/22/2011: Done    Override on 12/16/2008: Done    Override on 10/17/2005: Done    Lipid Panel 06/23/2021 6/23/2020    TETANUS VACCINE 11/25/2025 11/25/2015          EGD  Reflux - no  Dysphagia - no  Abdominal pain - 7yes  Diarrhea - no  Anemia - no  GI bleeding - no  Other - no    Colonoscopy  Screening - no  History of polyps - yes  Diarrhea - no  Anemia - no  Blood in stools - yes  Abdominal pain - no  Other - no    ROS:  CONSTITUTIONAL: Denies weight change,  fatigue, fevers, chills, night sweats.  CARDIOVASCULAR: Denies chest pain, shortness of breath, orthopnea and edema.  RESPIRATORY: Denies cough, hemoptysis, dyspnea, and wheezing.  GI: See HPI.    Medical History:   Past Medical History:   Diagnosis Date    Allergy     Basal cell carcinoma     Cancer     Prostate    Colon polyp        Surgical History:   Past Surgical History:   Procedure Laterality Date    COLONOSCOPY N/A 12/1/2015    Procedure: COLONOSCOPY;  Surgeon: Rod Rayo MD;  Location: [facility];  Service: Endoscopy;  Laterality: N/A;    PROSTATE SURGERY  2008    radical prostatectomy    VASECTOMY         Family History:   History reviewed. No  pertinent family history.    Social History:   Social History     Tobacco Use    Smoking status: Former Smoker     Packs/day: 0.00     Types: Cigars     Quit date: 1982     Years since quittin.5    Smokeless tobacco: Never Used   Substance Use Topics    Alcohol use: Yes     Alcohol/week: 11.7 standard drinks     Types: 14 Standard drinks or equivalent per week     Comment: couple cocktails/night    Drug use: No       Allergies:   Review of patient's allergies indicates:   Allergen Reactions    Penicillins      Other reaction(s): Swelling    Sulfa (sulfonamide antibiotics)      Other reaction(s): Rash    Bactrim  [sulfamethoxazole-trimethoprim] Rash       Medications:   No current facility-administered medications on file prior to encounter.      Current Outpatient Medications on File Prior to Encounter   Medication Sig Dispense Refill    fluocinolone (SYNALAR) 0.01 % Sham Apply topically once daily at 6am. 120 mL 3    hydrocortisone 2.5 % cream APPLY TOPICALLY TWICE DAILY 30 g 3    selenium sulfide 2.5 % Lotn Shampoo lather 3-5 mins nightly for itching.      ALPRAZolam (XANAX) 0.25 MG tablet TAKE 1 TO 2 TABLETS BY MOUTH EVERY 4 HOURS AS NEEDED   MAY CAUSE DROWSINESS   (Patient not taking: Reported on 10/1/2019) 60 tablet 1    clindamycin (CLEOCIN T) 1 % external solution AAA bid 60 mL 3    clindamycin (CLEOCIN T) 1 % external solution Apply topically 2 (two) times daily. To scalp and trunk 60 mL 3    fluocinonide (LIDEX) 0.05 % gel APPLY TO AFFECTED AREA ONCE DAILY 60 g 3    hydroxyzine HCL (ATARAX) 25 MG tablet Take 1/2 to 1 tablet for itching as needed. 60 tablet 11    insulin syringe-needle U-100 1 mL 31 gauge x 5/16 Syrg Use as directed with ICI Therapy 10 each 12    loratadine (CLARITIN) 10 mg tablet Take 10 mg by mouth once daily.      meloxicam (MOBIC) 15 MG tablet Take 1 tablet (15 mg total) by mouth once daily. 30 tablet 1    methylPREDNISolone (MEDROL DOSEPACK) 4 mg tablet As  directed 1 Package 0    montelukast (SINGULAIR) 10 mg tablet Take 10 mg by mouth every evening.      montelukast (SINGULAIR) 10 mg tablet TAKE 1 TABLET BY MOUTH EVERY EVENING 90 tablet 4    pantoprazole (PROTONIX) 40 MG tablet Take 1 tablet (40 mg total) by mouth once daily. 90 tablet 3    papaverine 30 mg/mL injection Add: Phentolamine 10 mg  Add: PGE1 100 mcg    Sig:  Inject 15 units (0.15 mls) as directed 10 mL 11    ranitidine (ZANTAC) 300 MG tablet TAKE ONE TABLET BY MOUTH EVERY EVENING 90 tablet 4    sildenafil (REVATIO) 20 mg Tab Take 5 tablets by mouth 1 hour before sexual activity 90 tablet 11    sodium,potassium,mag sulfates (SUPREP BOWEL PREP KIT) 17.5-3.13-1.6 gram SolR Take as instructed on prep sheet 354 mL 0       Physical Exam:  Vital Signs:   Vitals:    06/25/20 1211   BP: (!) 143/75   Pulse: 66   Resp: 16   Temp: 97.7 °F (36.5 °C)     General Appearance: Well appearing in no acute distress  ENT: OP clear  Chest: CTA B  CV: RRR, no m/r/g  Abd: s/nt/nd/nabs  Ext: no edema    Labs:Reviewed    IMP:  Active Hospital Problems    Diagnosis  POA    *Epigastric pain [R10.13]  Yes    Rectal bleeding [K62.5]  Yes      Resolved Hospital Problems   No resolved problems to display.         Plan:   I have explained the risks and benefits of upper endoscopy and colonoscopy to the patient including but not limited to bleeding, perforation, infection, and death. The patient wishes to proceed.

## 2020-06-25 NOTE — PROVATION PATIENT INSTRUCTIONS
Discharge Summary/Instructions after an Endoscopic Procedure  Patient Name: Blayne Mendoza  Patient MRN: [id number]  Patient YOB: 1955 Thursday, June 25, 2020 Rod Rayo MD  RESTRICTIONS:  During your procedure today, you received medications for sedation.  These   medications may affect your judgment, balance and coordination.  Therefore,   for 24 hours, you have the following restrictions:   - DO NOT drive a car, operate machinery, make legal/financial decisions,   sign important papers or drink alcohol.    ACTIVITY:  Today: no heavy lifting, straining or running due to procedural   sedation/anesthesia.  The following day: return to full activity including work.  DIET:  Eat and drink normally unless instructed otherwise.     TREATMENT FOR COMMON SIDE EFFECTS:  - Mild abdominal pain, nausea, belching, bloating or excessive gas:  rest,   eat lightly and use a heating pad.  - Sore Throat: treat with throat lozenges and/or gargle with warm salt   water.  - Because air was used during the procedure, expelling large amounts of air   from your rectum or belching is normal.  - If a bowel prep was taken, you may not have a bowel movement for 1-3 days.    This is normal.  SYMPTOMS TO WATCH FOR AND REPORT TO YOUR PHYSICIAN:  1. Abdominal pain or bloating, other than gas cramps.  2. Chest pain.  3. Back pain.  4. Signs of infection such as: chills or fever occurring within 24 hours   after the procedure.  5. Rectal bleeding, which would show as bright red, maroon, or black stools.   (A tablespoon of blood from the rectum is not serious, especially if   hemorrhoids are present.)  6. Vomiting.  7. Weakness or dizziness.  GO DIRECTLY TO THE NEAREST EMERGENCY ROOM IF YOU HAVE ANY OF THE FOLLOWING:      Difficulty breathing              Chills and/or fever over 101 F   Persistent vomiting and/or vomiting blood   Severe abdominal pain   Severe chest pain   Black, tarry stools   Bleeding- more than one  tablespoon   Any other symptom or condition that you feel may need urgent attention  Your doctor recommends these additional instructions:  If any biopsies were taken, your doctors clinic will contact you in 1 to 2   weeks with any results.  - Patient has a contact number available for emergencies.  The signs and   symptoms of potential delayed complications were discussed with the   patient.  Return to normal activities tomorrow.  Written discharge   instructions were provided to the patient.   - The patient should eat a bland diet and avoid caffeine, carbonation,   alcohol, nicotine, aspirin and NSAID's including ibuprofen and advil.    - Use sucralfate tablets 1 gram PO QID for 2 months.   - Return to my office in 2 weeks.   - Discharge patient to home (via wheelchair).  For questions, problems or results please call your physician Rod Rayo MD at Work:  [phone]  If you have any questions about the above instructions, call the GI   department at [phone] or call the endoscopy unit at [phone]   from 7am until 3 pm.  [facility] [place], EMERGENCY ROOM PHONE NUMBER:   [phone]  IF A COMPLICATION OR EMERGENCY SITUATION ARISES AND YOU ARE UNABLE TO REACH   YOUR PHYSICIAN - GO DIRECTLY TO THE EMERGENCY ROOM.  I have read or have had read to me these discharge instructions for my   procedure and have received a written copy.  I understand these   instructions and will follow-up with my physician if I have any questions.     __________________________________       _____________________________________  Nurse Signature                                          Patient/Designated   Responsible Party Signature  Rod Rayo MD  6/25/2020 2:03:14 PM  This report has been verified and signed electronically.  [employer]

## 2020-06-25 NOTE — DISCHARGE INSTRUCTIONS
Ischemic Colitis     Ischemic colitis happens if blood flow to a part of the colon is reduced.   Ischemic colitis happens when blood flow to the colon is reduced or blocked. Bloody diarrhea and severe belly pain are the most common symptoms. Other symptoms include vomiting, fever, and fainting. Diarrhea can lead to severe dehydration. This is the rapid loss of the fluids your body needs to function. Because of the severe pain and the risk for dehydration, ischemic colitis should be treated right away.  Causes of ischemic colitis  The cause of the reduction or blockage of blood flow to the colon is not well understood. In some cases, a sudden drop in blood pressure, or dehydration, leads to an episode. Ischemic colitis is more likely in people with blood clotting problems or heart and blood vessel disease.  Diagnosing ischemic colitis  The presence of severe belly pain and bloody diarrhea is often enough to diagnose ischemic colitis. After these symptoms are treated, a test called a colonoscopy will likely be done. This helps rule out other colon problems. The test uses a thin, flexible scope with a light and camera on the end. The scope is inserted through the rectum into the colon. The scope sends pictures from inside the colon to a video screen. A small sample of tissue (biopsy) from the colon may be taken for further testing in a lab.  Treating ischemic colitis  Episodes are treated in the hospital. You may remain in the hospital for several days or longer:  · An IV line is put into a vein in your hand or arm. You will be given fluids through the IV to treat dehydration. You are also given IV pain medicines if you need them.  · You may be given IV antibiotics (medicines that treat infection).  · To rest the bowel, you will not eat or drink for a few days. In rare cases, when symptoms are very severe, you will be given nutrition through the IV.  · If you lost a lot of blood during the episode, you may receive a  blood transfusion.  · In rare cases, an episode causes severe damage to the colon. In this case, surgery may need to be done to remove the damaged section. Your healthcare provider can tell you more if this is needed.  Follow-up  While you are being treated, your healthcare provider will work to find the cause of your ischemic colitis. After you recover, you may need to make lifestyle changes, such as quitting smoking, or take medicines to decrease your risk of another episode. Call 911 or emergency services or go to the emergency room right away if your symptoms return.  Date Last Reviewed: 7/1/2016  © 6776-3117 [employer]. [address] [place] [address], [place] [address]. All rights reserved. This information is not intended as a substitute for professional medical care. Always follow your healthcare professional's instructions.        Diverticulosis    Diverticulosis means that small pouches have formed in the wall of your large intestine (colon). Most often, this problem causes no symptoms and is common as people age. But the pouches in the colon are at risk of becoming infected. When this happens, the condition is called diverticulitis. Although most people with diverticulosis never develop diverticulitis, it is still not uncommon. Rectal bleeding can also occur and in less common situations, a type of colon inflammation called colitis.  While most people do not have symptoms, some people with diverticulosis may have:  · Abdominal cramps and pain  · Bloating  · Constipation  · Change in bowel habits  Causes  The exact cause of diverticulosis (and diverticulitis) has not been proved, but a few things are associated with the condition:  · Low-fiber diet  · Constipation  · Lack of exercise  Your healthcare provider will talk with you about how to manage your condition. Diet changes may be all that are needed to help control diverticulosis and prevent progression to diverticulitis. If you develop  diverticulitis, you will likely need other treatments.  Home care  You may be told to take fiber supplements daily. Fiber adds bulk to the stool so that it passes through the colon more easily. Stool softeners may be recommended. You may also be given medications for pain relief. Be sure to take all medications as directed.  In the past, people were told to avoid corn, nuts, and seeds. This is no longer necessary.  Follow these guidelines when caring for yourself at home:  · Eat unprocessed foods that are high in fiber. Whole grains, fruits, and vegetables are good choices.  · Drink 6 to 8 glasses of water every day unless your healthcare provider has you limit how much fluid you should have.  · Watch for changes in your bowel movements. Tell your provider if you notice any changes.  · Begin an exercise program. Ask your provider how to get started. Generally, walking is the best.  · Get plenty of rest and sleep.  Follow-up care  Follow up with your healthcare provider, or as advised. Regular visits may be needed to check on your health. Sometimes special procedures such as colonoscopy, are needed after an episode of diverticulitis or blooding. Be sure to keep all your appointments.  If a stool sample was taken, or cultures were done, you should be told if they are positive, or if your treatment needs to be changed. You can call as directed for the results.  If X-rays were done, a radiologist will look at them. You will be told if there is a change in your treatment.  If antibiotics were prescribed, be sure to finish them all.  When to seek medical advice  Call your healthcare provider right away if any of these occur:  · Fever of 100.4°F (38°C) or higher, or as directed by your healthcare provider  · Severe cramps in the lower left side of the abdomen or pain that is getting worse  · Tenderness in the lower left side of the abdomen or worsening pain throughout the abdomen  · Diarrhea or constipation that doesn't  get better within 24 hours  · Nausea and vomiting  · Bleeding from the rectum  Call 911  Call emergency services if any of the following occur:  · Trouble breathing  · Confusion  · Very drowsy or trouble awakening  · Fainting or loss of consciousness  · Rapid heart rate  · Chest pain  Date Last Reviewed: 12/30/2015  © 4166-2348 [employer]. [address] [place], [place] [id number]. All rights reserved. This information is not intended as a substitute for professional medical care. Always follow your healthcare professional's instructions.        Understanding Colon and Rectal Polyps    The colon (also called the large intestine) is a muscular tube that forms the last part of the digestive tract. It absorbs water and stores food waste. The colon is about 4 to 6 feet long. The rectum is the last 6 inches of the colon. The colon and rectum have a smooth lining composed of millions of cells. Changes in these cells can lead to growths in the colon that can become cancerous and should be removed. Multiple tests are available to screen for colon cancer, but the colonoscopy is the most recommended test. During colonoscopy, these polyps can be removed. How often you need this test depends on many things including your condition, your family history, symptoms, and what the findings were at the previous colonoscopy.   When the colon lining changes  Changes that happen in the cells that line the colon or rectum can lead to growths called polyps. Over a period of years, polyps can turn cancerous. Removing polyps early may prevent cancer from ever forming.  Polyps  Polyps are fleshy clumps of tissue that form on the lining of the colon or rectum. Small polyps are usually benign (not cancerous). However, over time, cells in a polyp can change and become cancerous. Certain types of polyps known as adenomatous polyps are premalignant. The risk for invasive cancer increases with the size of the polyp and certain cell  and gene features. This means that they can become cancerous if they're not removed. Hyperplastic polyps are benign. They can grow quite large and not turn cancerous.   Cancer  Almost all colorectal cancers start when polyp cells begin growing abnormally. As a cancerous tumor grows, it may involve more and more of the colon or rectum. In time, cancer can also grow beyond the colon or rectum and spread to nearby organs or to glands called lymph nodes. The cells can also travel to other parts of the body. This is known as metastasis. The earlier a cancerous tumor is removed, the better the chance of preventing its spread.    Date Last Reviewed: 8/1/2016  © 0771-5501 [employer]. [address] [place] [address], [place] [address]. All rights reserved. This information is not intended as a substitute for professional medical care. Always follow your healthcare professional's instructions.        Gastritis (Adult)    Gastritis is inflammation and irritation of the stomach lining. It can be present for a short time (acute) or be long lasting (chronic). Gastritis is often caused by infection with bacteria called H pylori. More than a third of people in the  have this bacteria in their bodies. In many cases, H pylori causes no problems or symptoms. In some people, though, the infection irritates the stomach lining and causes gastritis. Other causes of stomach irritation include drinking alcohol or taking pain-relieving medicines called NSAIDs (such as aspirin or ibuprofen).   Symptoms of gastritis can include:  · Abdominal pain or bloating  · Loss of appetite  · Nausea or vomiting  · Vomiting blood or having black stools  · Feeling more tired than usual  An inflamed and irritated stomach lining is more likely to develop a sore called an ulcer. To help prevent this, gastritis should be treated.  Home care  If needed, medicines may be prescribed. If you have H pylori infection, treating it will likely relieve your  symptoms. Other changes can help reduce stomach irritation and help it heal.  · If you have been prescribed medicines for H pylori infection, take them as directed. Take all of the medicine until it is finished or your healthcare provider tells you to stop, even if you feel better.  · Your healthcare provider may recommend avoiding NSAIDs. If you take daily aspirin for your heart or other medical reasons, do not stop without talking to your healthcare provider first.  · Avoid drinking alcohol.  · Stop smoking. Smoking can irritate the stomach and delay healing. As much as possible, stay away from second hand smoke.  Follow-up care  Follow up with your healthcare provider, or as advised by our staff. Testing may be needed to check for inflammation or an ulcer.  When to seek medical advice  Call your healthcare provider for any of the following:  · Stomach pain that gets worse or moves to the lower right abdomen (appendix area)  · Chest pain that appears or gets worse, or spreads to the back, neck, shoulder, or arm  · Frequent vomiting (cant keep down liquids)  · Blood in the stool or vomit (red or black in color)  · Feeling weak or dizzy  · Fever of 100.4ºF (38ºC) or higher, or as directed by your healthcare provider  Date Last Reviewed: 6/22/2015  © 8146-8020 [employer]. [address] [place] [address], [place] [address]. All rights reserved. This information is not intended as a substitute for professional medical care. Always follow your healthcare professional's instructions.

## 2020-06-25 NOTE — TRANSFER OF CARE
Anesthesia Transfer of Care Note    Patient: Blayne Mendoza    Procedure(s) Performed: Procedure(s) (LRB):  ESOPHAGOGASTRODUODENOSCOPY (EGD) (N/A)  COLONOSCOPY (N/A)    Patient location: PACU    Anesthesia Type: MAC    Transport from OR: Transported from OR on room air with adequate spontaneous ventilation    Post pain: adequate analgesia    Post assessment: no apparent anesthetic complications and tolerated procedure well    Post vital signs: stable    Level of consciousness: alert, awake and oriented    Nausea/Vomiting: no nausea/vomiting    Complications: none    Transfer of care protocol was followed      Last vitals:   Visit Vitals  BP (!) 143/75 (BP Location: Left arm, Patient Position: Lying)   Pulse 66   Temp 36.5 °C (97.7 °F)   Resp 16   Ht 6' (1.829 m)   Wt 100 kg (220 lb 7.4 oz)   BMI 29.90 kg/m²

## 2020-06-25 NOTE — ANESTHESIA PREPROCEDURE EVALUATION
06/25/2020  Blayne Mendoza is a 64 y.o., male.    Anesthesia Evaluation    I have reviewed the Patient Summary Reports.    I have reviewed the Nursing Notes. I have reviewed the NPO Status.   I have reviewed the Medications.     Review of Systems  Anesthesia Hx:  No problems with previous Anesthesia    Social:  Former Smoker, Alcohol Use    Hematology/Oncology:         -- Cancer in past history: surgery  Oncology Comments: Prostate cancer - radical prostatectomy     Hepatic/GI:   Bowel prep:    GERD, well controlled Diverticulosis    Colon polyps    Rectal bleeding       Physical Exam  General:  Well nourished    Airway/Jaw/Neck:  Airway Findings: Mouth Opening: Normal Tongue: Normal  General Airway Assessment: Adult  Mallampati: II  Improves to II with phonation.  TM Distance: Normal, at least 6 cm  Jaw/Neck Findings:  Neck ROM: Normal ROM      Dental:  Dental Findings: In tact        Mental Status:  Mental Status Findings:  Cooperative, Alert and Oriented         Anesthesia Plan  Type of Anesthesia, risks & benefits discussed:  Anesthesia Type:  MAC  Patient's Preference:   Intra-op Monitoring Plan:   Intra-op Monitoring Plan Comments:   Post Op Pain Control Plan: multimodal analgesia  Post Op Pain Control Plan Comments:   Induction:   IV  Beta Blocker:  Patient is not currently on a Beta-Blocker (No further documentation required).       Informed Consent: Patient understands risks and agrees with Anesthesia plan.  Questions answered. Anesthesia consent signed with patient.  ASA Score: 3     Day of Surgery Review of History & Physical: I have interviewed and examined the patient. I have reviewed the patient's H&P dated:            Ready For Surgery From Anesthesia Perspective.

## 2020-06-25 NOTE — DISCHARGE SUMMARY
Endoscopy Discharge Summary      Admit Date: 6/25/2020    Discharge Date and Time:  6/25/2020 2:05 PM    Attending Physician: Rod Rayo MD     Discharge Physician: Rod Rayo MD     Principal Admitting Diagnoses: Epigastric pain         Discharge Diagnosis: The primary encounter diagnosis was Epigastric pain. Diagnoses of Polyp of colon, unspecified part of colon, unspecified type, Diverticulosis of large intestine without hemorrhage, Rectal bleeding, Erosive gastritis, and Colitis were also pertinent to this visit.     Discharged Condition: Good    Indication for Admission: Epigastric pain     Hospital Course: Patient was admitted for an inpatient procedure and tolerated the procedure well with no complications.    Significant Diagnostic Studies: EGD with cold biopsy, Colonoscopy with cold biopsy and Colonoscopy with cold biopsy polypectomy    Pathology (if any):  Specimen (12h ago, onward)    None          Estimated Blood Loss: 1 ml.    Discussed with: patient and family.    Disposition: Home.    Follow Up/Patient Instructions:   Current Discharge Medication List      START taking these medications    Details   sucralfate (CARAFATE) 1 gram tablet Take 1 tablet (1 g total) by mouth 4 (four) times daily.  Qty: 120 tablet, Refills: 1         CONTINUE these medications which have NOT CHANGED    Details   fluocinolone (SYNALAR) 0.01 % Sham Apply topically once daily at 6am.  Qty: 120 mL, Refills: 3      hydrocortisone 2.5 % cream APPLY TOPICALLY TWICE DAILY  Qty: 30 g, Refills: 3    Comments: This prescription was filled on 1/3/2020. Any refills authorized will be placed on file.  Associated Diagnoses: Lichen simplex chronicus      selenium sulfide 2.5 % Lotn Shampoo lather 3-5 mins nightly for itching.      ALPRAZolam (XANAX) 0.25 MG tablet TAKE 1 TO 2 TABLETS BY MOUTH EVERY 4 HOURS AS NEEDED   MAY CAUSE DROWSINESS    Qty: 60 tablet, Refills: 1      !! clindamycin (CLEOCIN T) 1 % external solution AAA  bid  Qty: 60 mL, Refills: 3    Associated Diagnoses: Acne necrotica      !! clindamycin (CLEOCIN T) 1 % external solution Apply topically 2 (two) times daily. To scalp and trunk  Qty: 60 mL, Refills: 3    Comments: Generic okay  Associated Diagnoses: Folliculitis      fluocinonide (LIDEX) 0.05 % gel APPLY TO AFFECTED AREA ONCE DAILY  Qty: 60 g, Refills: 3    Comments: PT REQUESTING THE SAME MEDICATION BUT IN A CREAM DUE TO THE GEL STAINING HIS CLOTHES      hydroxyzine HCL (ATARAX) 25 MG tablet Take 1/2 to 1 tablet for itching as needed.  Qty: 60 tablet, Refills: 11      insulin syringe-needle U-100 1 mL 31 gauge x 5/16 Syrg Use as directed with [facility] Therapy  Qty: 10 each, Refills: 12    Associated Diagnoses: Prostate cancer; History of radical prostatectomy; Erectile dysfunction following radical prostatectomy      loratadine (CLARITIN) 10 mg tablet Take 10 mg by mouth once daily.      montelukast (SINGULAIR) 10 mg tablet TAKE 1 TABLET BY MOUTH EVERY EVENING  Qty: 90 tablet, Refills: 4      pantoprazole (PROTONIX) 40 MG tablet Take 1 tablet (40 mg total) by mouth once daily.  Qty: 90 tablet, Refills: 3      papaverine 30 mg/mL injection Add: Phentolamine 10 mg  Add: PGE1 100 mcg    Sig:  Inject 15 units (0.15 mls) as directed  Qty: 10 mL, Refills: 11    Associated Diagnoses: Prostate cancer; History of radical prostatectomy; Erectile dysfunction following radical prostatectomy      ranitidine (ZANTAC) 300 MG tablet TAKE ONE TABLET BY MOUTH EVERY EVENING  Qty: 90 tablet, Refills: 4       !! - Potential duplicate medications found. Please discuss with provider.      STOP taking these medications       meloxicam (MOBIC) 15 MG tablet Comments:   Reason for Stopping:         methylPREDNISolone (MEDROL DOSEPACK) 4 mg tablet Comments:   Reason for Stopping:         sildenafil (REVATIO) 20 mg Tab Comments:   Reason for Stopping:         sodium,potassium,mag sulfates (SUPREP BOWEL PREP KIT) 17.5-3.13-1.6 gram SolR Comments:    Reason for Stopping:               Discharge Procedure Orders   Diet general     Diet general     Call MD for:  temperature >100.4     Call MD for:  persistent nausea and vomiting     Call MD for:  severe uncontrolled pain     Call MD for:  difficulty breathing, headache or visual disturbances     Call MD for:  redness, tenderness, or signs of infection (pain, swelling, redness, odor or green/yellow discharge around incision site)     Call MD for:  hives     Call MD for:  persistent dizziness or light-headedness     No dressing needed     Call MD for:  temperature >100.4     Call MD for:  persistent nausea and vomiting     Call MD for:  severe uncontrolled pain     Call MD for:  difficulty breathing, headache or visual disturbances     Call MD for:  redness, tenderness, or signs of infection (pain, swelling, redness, odor or green/yellow discharge around incision site)     Call MD for:  hives     Call MD for:  persistent dizziness or light-headedness     No dressing needed       Follow-up Information     Rod Rayo MD. Call in 2 weeks.    Specialty: Family Medicine  Why: To receive pathology results.  Contact information:  [id number] [facility] [address] [place] [address] [phone]

## 2020-06-25 NOTE — ANESTHESIA RELEASE NOTE
Anesthesia Release from PACU Note    Patient: Blayne Mendoza    Procedure(s) Performed: Procedure(s) (LRB):  ESOPHAGOGASTRODUODENOSCOPY (EGD) (N/A)  COLONOSCOPY (N/A)    Anesthesia type: MAC    Post pain: Adequate analgesia    Post assessment: no apparent anesthetic complications, tolerated procedure well and no evidence of recall    Last Vitals:   Visit Vitals  BP (!) 143/75 (BP Location: Left arm, Patient Position: Lying)   Pulse 66   Temp 36.5 °C (97.7 °F)   Resp 16   Ht 6' (1.829 m)   Wt 100 kg (220 lb 7.4 oz)   BMI 29.90 kg/m²       Post vital signs: stable    Level of consciousness: awake, alert  and oriented    Nausea/Vomiting: no nausea/no vomiting    Complications: none    Airway Patency: patent    Respiratory: unassisted, spontaneous ventilation, room air    Cardiovascular: stable and blood pressure at baseline    Hydration: euvolemic

## 2020-07-01 LAB
FINAL PATHOLOGIC DIAGNOSIS: ABNORMAL
GROSS: ABNORMAL

## 2020-07-06 NOTE — PROGRESS NOTES
Primary Care Telemedicine Note   The patient location is:  Patient Home    The chief complaint leading to consultation is: home   Total time spent with patient: 15 min   Visit type: Virtual visit with synchronous audio only and video   Each patient to whom he or she provides medical services by telemedicine is:  (1) informed of the relationship between the physician and patient and the respective role of any other health care provider with respect to management of the patient; and (2) notified that he or she may decline to receive medical services by telemedicine and may withdraw from such care at any time.       CC:As Above   HPI:   Problem List Items Addressed This Visit     None       he has had problems with GERD and he has had had some rectal bleeding noted.  It is not a lot but it has occurred. He is not having pain now. He has not had fever or chills noted.  No weakness noted.  No melena noted.  He is not on blood thinners right now.  eh has had gerd for a long time.  He has no nausea or vomiting noted.      The patient's Health Maintenance was reviewed and the following appears to be due at this time:   Health Maintenance Due   Topic Date Due    HIV Screening  09/27/1970    Shingles Vaccine (1 of 2) 09/27/2004    Pneumococcal Vaccine (65+ High/Highest Risk) (2 of 2 - PPSV23) 09/27/2019    Abdominal Aortic Aneurysm Screening  09/27/2019          Outpatient Medications Prior to Visit   Medication Sig Dispense Refill    ALPRAZolam (XANAX) 0.25 MG tablet TAKE 1 TO 2 TABLETS BY MOUTH EVERY 4 HOURS AS NEEDED   MAY CAUSE DROWSINESS   (Patient not taking: Reported on 10/1/2019) 60 tablet 1    clindamycin (CLEOCIN T) 1 % external solution AAA bid 60 mL 3    clindamycin (CLEOCIN T) 1 % external solution Apply topically 2 (two) times daily. To scalp and trunk 60 mL 3    fluocinolone (SYNALAR) 0.01 % Sham Apply topically once daily at 6am. 120 mL 3    fluocinonide (LIDEX) 0.05 % gel APPLY TO AFFECTED AREA ONCE  DAILY 60 g 3    hydrocortisone 2.5 % cream APPLY TOPICALLY TWICE DAILY 30 g 3    hydroxyzine HCL (ATARAX) 25 MG tablet Take 1/2 to 1 tablet for itching as needed. 60 tablet 11    insulin syringe-needle U-100 1 mL 31 gauge x 5/16 Syrg Use as directed with [facility] Therapy 10 each 12    loratadine (CLARITIN) 10 mg tablet Take 10 mg by mouth once daily.      montelukast (SINGULAIR) 10 mg tablet TAKE 1 TABLET BY MOUTH EVERY EVENING 90 tablet 4    pantoprazole (PROTONIX) 40 MG tablet Take 1 tablet (40 mg total) by mouth once daily. 90 tablet 3    papaverine 30 mg/mL injection Add: Phentolamine 10 mg  Add: PGE1 100 mcg    Sig:  Inject 15 units (0.15 mls) as directed 10 mL 11    ranitidine (ZANTAC) 300 MG tablet TAKE ONE TABLET BY MOUTH EVERY EVENING 90 tablet 4    selenium sulfide 2.5 % Lotn Shampoo lather 3-5 mins nightly for itching.      meloxicam (MOBIC) 15 MG tablet Take 1 tablet (15 mg total) by mouth once daily. 30 tablet 1    methylPREDNISolone (MEDROL DOSEPACK) 4 mg tablet As directed 1 Package 0    montelukast (SINGULAIR) 10 mg tablet Take 10 mg by mouth every evening.      sildenafil (REVATIO) 20 mg Tab Take 5 tablets by mouth 1 hour before sexual activity 90 tablet 11    sodium,potassium,mag sulfates (SUPREP BOWEL PREP KIT) 17.5-3.13-1.6 gram SolR Take as instructed on prep sheet 354 mL 0     No facility-administered medications prior to visit.        PMH:  Past Medical History:   Diagnosis Date    Allergy     Basal cell carcinoma     Cancer     Prostate    Colon polyp     Erosive gastritis 6/25/2020     Past Surgical History:   Procedure Laterality Date    COLONOSCOPY N/A 12/1/2015    Procedure: COLONOSCOPY;  Surgeon: Rod Rayo MD;  Location: [facility];  Service: Endoscopy;  Laterality: N/A;    COLONOSCOPY N/A 6/25/2020    Procedure: COLONOSCOPY;  Surgeon: Rod Rayo MD;  Location: [facility];  Service: Endoscopy;  Laterality: N/A;    ESOPHAGOGASTRODUODENOSCOPY N/A 6/25/2020     Procedure: ESOPHAGOGASTRODUODENOSCOPY (EGD);  Surgeon: Rod Rayo MD;  Location[facility];  Service: Endoscopy;  Laterality: N/A;    PROSTATE SURGERY  2008    radical prostatectomy    VASECTOMY       Review of patient's allergies indicates:   Allergen Reactions    Penicillins      Other reaction(s): Swelling    Sulfa (sulfonamide antibiotics)      Other reaction(s): Rash    Bactrim  [sulfamethoxazole-trimethoprim] Rash     Social History     Socioeconomic History    Marital status:      Spouse name: Not on file    Number of children: Not on file    Years of education: Not on file    Highest education level: Not on file   Occupational History    Not on file   Social Needs    Financial resource strain: Not on file    Food insecurity     Worry: Not on file     Inability: Not on file    Transportation needs     Medical: Not on file     Non-medical: Not on file   Tobacco Use    Smoking status: Former Smoker     Packs/day: 0.00     Types: Cigars     Quit date: 1982     Years since quittin.6    Smokeless tobacco: Never Used   Substance and Sexual Activity    Alcohol use: Yes     Alcohol/week: 11.7 standard drinks     Types: 14 Standard drinks or equivalent per week     Comment: couple cocktails/night    Drug use: No    Sexual activity: Yes     Partners: Female   Lifestyle    Physical activity     Days per week: Not on file     Minutes per session: Not on file    Stress: Not on file   Relationships    Social connections     Talks on phone: Not on file     Gets together: Not on file     Attends Yazidism service: Not on file     Active member of club or organization: Not on file     Attends meetings of clubs or organizations: Not on file     Relationship status: Not on file   Other Topics Concern    Not on file   Social History Narrative    Not on file     No family history on file.        ROS   Constitutional: Negative.  Negative for chills, diaphoresis and fever.    HENT:  Negative for congestion, hearing loss, mouth sores, postnasal drip and sore throat.     Eyes: Negative for pain and visual disturbance.    Respiratory: Negative for cough, chest tightness, shortness of breath and wheezing.     Cardiovascular: Negative for chest pain.    Gastrointestinal: Negative for abdominal pain, anal bleeding, blood in stool, constipation, diarrhea, nausea and vomiting.    Genitourinary: Negative for dysuria and hematuria.    Musculoskeletal: Negative for back pain, neck pain and neck stiffness.    Skin: Negative for rash.    Neurological: Negative for dizziness and weakness.        Physical Exam    (Vitals taken at home and reported to us and documented)   Pulse If Self Reported:No flowsheet data found.    Last 5 Patient Entered Readings                                      Current 30 Day Average:      There is no flowsheet data to display.         BP If Self Reported:No flowsheet data found.   Pulse Readings from Last 3 Encounters:   06/25/20 61   10/01/19 83   08/12/19 88      Weight If Self Reported:No flowsheet data found.   Glucose If Self Reported:No flowsheet data found.   Last 6 Patient Entered Readings                                          Most Recent A1c:      There is no flowsheet data to display.         There were no vitals taken for this visit. if verbally reported and entered.    Constitutional: He is oriented to person, place, and time. He appears well-developed and well-nourished. No distress.    Eyes: EOM are normal.    Pulmonary/Chest: Effort normal. No respiratory distress.    Neurological: He is alert and oriented to person, place, and time.    Skin: He is not diaphoretic.    Psychiatric: He has a normal mood and affect. His behavior is normal. Judgment and thought content normal.        DIAGNOSIS  Encounter Diagnoses   Name Primary?    Rectal bleeding Yes    Gastroesophageal reflux disease, esophagitis presence not specified           PLAN:     I am having Isaac PETERSEN  Reji maintain his fluocinolone, selenium sulfide, papaverine, insulin syringe-needle U-100, loratadine, clindamycin, ALPRAZolam, clindamycin, fluocinonide, ranitidine, montelukast, hydrocortisone, hydrOXYzine HCL, and pantoprazole.       Diagnoses and all orders for this visit:    Rectal bleeding    Gastroesophageal reflux disease, esophagitis presence not specified    Other orders  -     COVID-19 Routine Screening    he is to get a colonoscopy and an EGD in the near future.           No orders of the defined types were placed in this encounter.

## 2020-07-14 ENCOUNTER — OFFICE VISIT (OUTPATIENT)
Dept: FAMILY MEDICINE | Facility: CLINIC | Age: 65
End: 2020-07-14
Payer: COMMERCIAL

## 2020-07-14 DIAGNOSIS — K29.60 EROSIVE GASTRITIS: Primary | ICD-10-CM

## 2020-07-14 DIAGNOSIS — K52.9 COLITIS: ICD-10-CM

## 2020-07-14 DIAGNOSIS — K62.5 RECTAL BLEEDING: ICD-10-CM

## 2020-07-14 PROCEDURE — 99213 PR OFFICE/OUTPT VISIT, EST, LEVL III, 20-29 MIN: ICD-10-PCS | Mod: 95,,, | Performed by: FAMILY MEDICINE

## 2020-07-14 PROCEDURE — 1101F PT FALLS ASSESS-DOCD LE1/YR: CPT | Mod: CPTII,,, | Performed by: FAMILY MEDICINE

## 2020-07-14 PROCEDURE — 99213 OFFICE O/P EST LOW 20 MIN: CPT | Mod: 95,,, | Performed by: FAMILY MEDICINE

## 2020-07-14 PROCEDURE — 1101F PR PT FALLS ASSESS DOC 0-1 FALLS W/OUT INJ PAST YR: ICD-10-PCS | Mod: CPTII,,, | Performed by: FAMILY MEDICINE

## 2020-07-14 NOTE — PROGRESS NOTES
The patient presents with hx rectal bleeding-had EGD and c scope showing gastric erosions but no active bleeding since and 2 hyperplastic colon polyps and inflammation. He did not have any fever or purulent stool  ROS:  General: No fever or sig wt change  HEENT:No other PND eye pain or dc  Respiratory: No cough wheezing  PE: vital signs noted  HEENT: Normocephalic,with no recent trauma,PERRLA,EOMI,conjunctiva clear  Neck:Supple without adenopathy  Chest:Clear bilateral breath sounds with mild scattered ronchi  Heart:Regular rhthym without murmer  Abdomen:Soft, non tender,no masses, no hepatosplenomegaly  Extremeties and Neurologic:Grossly within normal limits     Impression:Gastritis  HP polyp  Small area of colitis  Plan: Reviewed above w patient, he can complete the prescribed carafate and report any new onset of bleeding.                          Answers for HPI/ROS submitted by the patient on 7/10/2020   activity change: No  unexpected weight change: No  neck pain: No  hearing loss: No  rhinorrhea: No  trouble swallowing: No  eye discharge: No  visual disturbance: No  chest tightness: No  wheezing: No  chest pain: No  palpitations: No  blood in stool: Yes  constipation: No  vomiting: No  diarrhea: No  polydipsia: No  polyuria: No  difficulty urinating: No  urgency: No  hematuria: No  joint swelling: No  arthralgias: No  headaches: No  weakness: No  confusion: No  dysphoric mood: No

## 2020-07-28 ENCOUNTER — OFFICE VISIT (OUTPATIENT)
Dept: FAMILY MEDICINE | Facility: CLINIC | Age: 65
End: 2020-07-28
Payer: COMMERCIAL

## 2020-07-28 ENCOUNTER — TELEPHONE (OUTPATIENT)
Dept: FAMILY MEDICINE | Facility: CLINIC | Age: 65
End: 2020-07-28

## 2020-07-28 ENCOUNTER — HOSPITAL ENCOUNTER (OUTPATIENT)
Dept: RADIOLOGY | Facility: HOSPITAL | Age: 65
Discharge: HOME | End: 2020-07-28
Attending: FAMILY MEDICINE
Payer: COMMERCIAL

## 2020-07-28 VITALS
DIASTOLIC BLOOD PRESSURE: 81 MMHG | BODY MASS INDEX: 30.59 KG/M2 | SYSTOLIC BLOOD PRESSURE: 155 MMHG | HEIGHT: 72 IN | HEART RATE: 79 BPM | WEIGHT: 225.81 LBS | TEMPERATURE: 98 F

## 2020-07-28 DIAGNOSIS — C61 PROSTATE CANCER: ICD-10-CM

## 2020-07-28 DIAGNOSIS — M25.561 CHRONIC PAIN OF RIGHT KNEE: ICD-10-CM

## 2020-07-28 DIAGNOSIS — M75.101 ROTATOR CUFF SYNDROME, RIGHT: ICD-10-CM

## 2020-07-28 DIAGNOSIS — G89.29 CHRONIC PAIN OF RIGHT KNEE: ICD-10-CM

## 2020-07-28 DIAGNOSIS — Z79.899 ENCOUNTER FOR LONG-TERM (CURRENT) USE OF MEDICATIONS: ICD-10-CM

## 2020-07-28 DIAGNOSIS — G89.29 CHRONIC RIGHT SHOULDER PAIN: ICD-10-CM

## 2020-07-28 DIAGNOSIS — Z00.01 ENCOUNTER FOR GENERAL ADULT MEDICAL EXAMINATION WITH ABNORMAL FINDINGS: Primary | ICD-10-CM

## 2020-07-28 DIAGNOSIS — M25.511 CHRONIC RIGHT SHOULDER PAIN: ICD-10-CM

## 2020-07-28 DIAGNOSIS — Z13.220 ENCOUNTER FOR LIPID SCREENING FOR CARDIOVASCULAR DISEASE: ICD-10-CM

## 2020-07-28 DIAGNOSIS — M19.019 AC JOINT ARTHROPATHY: ICD-10-CM

## 2020-07-28 DIAGNOSIS — Z13.6 ENCOUNTER FOR LIPID SCREENING FOR CARDIOVASCULAR DISEASE: ICD-10-CM

## 2020-07-28 PROCEDURE — 73030 XR SHOULDER COMPLETE 2 OR MORE VIEWS RIGHT: ICD-10-PCS | Mod: 26,RT,, | Performed by: RADIOLOGY

## 2020-07-28 PROCEDURE — 99999 PR PBB SHADOW E&M-EST. PATIENT-LVL IV: CPT | Mod: PBBFAC,,, | Performed by: FAMILY MEDICINE

## 2020-07-28 PROCEDURE — 99397 PER PM REEVAL EST PAT 65+ YR: CPT | Mod: S$GLB,,, | Performed by: FAMILY MEDICINE

## 2020-07-28 PROCEDURE — 73030 X-RAY EXAM OF SHOULDER: CPT | Mod: TC,PO,RT

## 2020-07-28 PROCEDURE — 99397 PR PREVENTIVE VISIT,EST,65 & OVER: ICD-10-PCS | Mod: S$GLB,,, | Performed by: FAMILY MEDICINE

## 2020-07-28 PROCEDURE — 99999 PR PBB SHADOW E&M-EST. PATIENT-LVL IV: ICD-10-PCS | Mod: PBBFAC,,, | Performed by: FAMILY MEDICINE

## 2020-07-28 PROCEDURE — 73030 X-RAY EXAM OF SHOULDER: CPT | Mod: 26,RT,, | Performed by: RADIOLOGY

## 2020-07-28 RX ORDER — KETOCONAZOLE 20 MG/ML
SHAMPOO, SUSPENSION TOPICAL
COMMUNITY
Start: 2020-05-16

## 2020-07-28 NOTE — PATIENT INSTRUCTIONS
Follow up in about 1 year (around 7/28/2021), or if symptoms worsen or fail to improve, for Annual Wellness Exam.     If no improvement in symptoms or symptoms worsen, please be advised to call MD, follow-up at clinic and/or go to ER if becomes severe.    Dustin Reyes M.D.        We Offer TELEHEALTH & Same Day Appointments!   Book your Telehealth appointment with me through my nurse or   Clinic appointments on [facility]!    [address] [place] [address]    Office: [phone]   FAX: [phone]    Check out my Facebook Page and Follow Me at: [email or web address]    Check out my website at [email or web address] by clicking on: [email or web address]    To Schedule appointments online, go to [email or web address]: [email or web address]

## 2020-07-28 NOTE — TELEPHONE ENCOUNTER
Called and notified patient of results and recommendations of Dr. Reyes see below.  Patent verbalized understanding of this and states he is leaving to go on vacation with his family and will schedule the injection once he returns.    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     I recommend proceeding with steroid injection and/or physical therapy.  If no improvement with these to treatments then we can proceed with MRI/orthopedics referral

## 2020-07-28 NOTE — PROGRESS NOTES
======================================================  GOAL of current visit:  Est Care/Right Shoulder pain and Right Meniscus Pain    Previous PCP: Dr. Christopher Méndez  Specialists: all within [facility]  Recent lab work: 2020  Recent imagin  Colonoscopy History: 2020    Health Maintenance Due   Topic Date Due    Shingles Vaccine (1 of 2) 2004    Pneumococcal Vaccine (65+ High/Highest Risk) (2 of 2 - PPSV23) 2019    Abdominal Aortic Aneurysm Screening  2019     ======================================================    PLAN:      Problem List Items Addressed This Visit     Right knee pain (Chronic)    Right shoulder pain (Chronic)    Relevant Orders    X-ray Shoulder 2 or More Views Right (Completed)    Encounter for long-term (current) use of medications (Chronic)     Complete history and physical was completed today.  Complete and thorough medication reconciliation was performed.  Discussed risks and benefits of medications.  Advised patient on orders and health maintenance.  We discussed old records and old labs if available.  Will request any records not available through epic.  Continue current medications listed on your summary sheet.           Relevant Orders    CBC Without Differential    TSH    Comprehensive metabolic panel    Hemoglobin A1C    Lipid Panel    AC joint arthropathy    Rotator cuff syndrome, right     X-ray reviewed.  Will plan for steroid injection of the right shoulder in the next 1 to 2 weeks.  Physical therapy also an option versus MRI and referral to Orthopedics if no improvement.           Other Visit Diagnoses     Encounter for general adult medical examination with abnormal findings    -  Primary    Relevant Orders    CBC Without Differential    TSH    Comprehensive metabolic panel    Hemoglobin A1C    Lipid Panel    Encounter for lipid screening for cardiovascular disease        Relevant Orders    Lipid Panel        Future Appointments     Date  Provider Specialty Appt Notes    1/28/2021  Lab     7/28/2021 Dustin Reyes MD Family Medicine Annual           Medication List with Changes/Refills   Current Medications    FLUOCINOLONE (SYNALAR) 0.01 % SHAM    Apply topically once daily at 6am.    FLUOCINONIDE (LIDEX) 0.05 % GEL    APPLY TO AFFECTED AREA ONCE DAILY    HYDROCORTISONE 2.5 % CREAM    APPLY TOPICALLY TWICE DAILY    HYDROXYZINE HCL (ATARAX) 25 MG TABLET    Take 1/2 to 1 tablet for itching as needed.    INSULIN SYRINGE-NEEDLE U-100 1 ML 31 GAUGE X 5/16 SYRG    Use as directed with ICI Therapy    KETOCONAZOLE (NIZORAL) 2 % SHAMPOO        LORATADINE (CLARITIN) 10 MG TABLET    Take 10 mg by mouth once daily.    MONTELUKAST (SINGULAIR) 10 MG TABLET    TAKE 1 TABLET BY MOUTH EVERY EVENING    PANTOPRAZOLE (PROTONIX) 40 MG TABLET    Take 1 tablet (40 mg total) by mouth once daily.    PAPAVERINE 30 MG/ML INJECTION    Add: Phentolamine 10 mg  Add: PGE1 100 mcg    Sig:  Inject 15 units (0.15 mls) as directed    RANITIDINE (ZANTAC) 300 MG TABLET    TAKE ONE TABLET BY MOUTH EVERY EVENING    SELENIUM SULFIDE 2.5 % LOTN    Shampoo lather 3-5 mins nightly for itching.    SUCRALFATE (CARAFATE) 1 GRAM TABLET    Take 1 tablet (1 g total) by mouth 4 (four) times daily.   Discontinued Medications    ALPRAZOLAM (XANAX) 0.25 MG TABLET    TAKE 1 TO 2 TABLETS BY MOUTH EVERY 4 HOURS AS NEEDED * MAY CAUSE DROWSINESS *    CLINDAMYCIN (CLEOCIN T) 1 % EXTERNAL SOLUTION    AAA bid    CLINDAMYCIN (CLEOCIN T) 1 % EXTERNAL SOLUTION    Apply topically 2 (two) times daily. To scalp and trunk       Dustin Reyes M.D.     ==========================================================================  Subjective:      Patient ID: Blayne Mendoza is a 64 y.o. male.  has a past medical history of Allergy, Basal cell carcinoma, Cancer, Colon polyp, and Erosive gastritis (6/25/2020).     Chief Complaint: Establish Care (Right Shoulder pain and Right Meniscus Pain)      Problem List Items  Addressed This Visit     Right knee pain (Chronic)    Right shoulder pain (Chronic)    Overview     X-ray Shoulder 2 or More Views Right  Narrative: EXAMINATION:  XR SHOULDER COMPLETE 2 OR MORE VIEWS RIGHT    CLINICAL HISTORY:  pain;Pain in right shoulder    TECHNIQUE:  Two or three views of the right shoulder were performed.    COMPARISON:  None    FINDINGS:  Advanced degenerative changes at the AC joint with marginal spurring including along the inferior aspect of the joint.  Mild degenerative changes at the glenohumeral articulation noted as well.  No acute fracture or dislocation.  Soft tissues appear normal.  Impression: As above    Electronically signed by: Matty Small MD  Date:    07/28/2020  Time:    11:20             Encounter for long-term (current) use of medications (Chronic)    Overview     CHRONIC. Stable. Compliant with medications for managed conditions. See medication list. No SE reported.   Routine lab analysis is being monitored. Refills were addressed.  Lab Results   Component Value Date    WBC 5.73 02/01/2019    HGB 14.2 02/01/2019    HCT 44.4 02/01/2019    MCV 97 02/01/2019     02/01/2019         Chemistry        Component Value Date/Time     02/01/2019 0848    K 4.8 02/01/2019 0848     02/01/2019 0848    CO2 30 (H) 02/01/2019 0848    BUN 20 02/01/2019 0848    CREATININE 0.9 02/01/2019 0848     02/01/2019 0848        Component Value Date/Time    CALCIUM 10.2 02/01/2019 0848    ALKPHOS 81 02/01/2019 0848    AST 18 02/01/2019 0848    ALT 22 02/01/2019 0848    BILITOT 0.5 02/01/2019 0848    ESTGFRAFRICA >60.0 02/01/2019 0848    EGFRNONAA >60.0 02/01/2019 0848          Lab Results   Component Value Date    TSH 1.607 02/01/2019              Current Assessment & Plan     Complete history and physical was completed today.  Complete and thorough medication reconciliation was performed.  Discussed risks and benefits of medications.  Advised patient on orders and health  maintenance.  We discussed old records and old labs if available.  Will request any records not available through epic.  Continue current medications listed on your summary sheet.           AC joint arthropathy    Rotator cuff syndrome, right    Overview     X-ray shows degenerative change of AC joint and glenohumeral joint.  Patient with overuse of the right arm with extracurricular activities and at work.  Has not had any steroid injection.  Patient takes Tylenol or anti-inflammatory as needed for the pain.         Current Assessment & Plan     X-ray reviewed.  Will plan for steroid injection of the right shoulder in the next 1 to 2 weeks.  Physical therapy also an option versus MRI and referral to Orthopedics if no improvement.           Other Visit Diagnoses     Encounter for general adult medical examination with abnormal findings    -  Primary    Encounter for lipid screening for cardiovascular disease               Past Medical History:  Past Medical History:   Diagnosis Date    Allergy     Basal cell carcinoma     Cancer     Prostate    Colon polyp     Erosive gastritis 6/25/2020     Past Surgical History:   Procedure Laterality Date    COLONOSCOPY N/A 12/1/2015    Procedure: COLONOSCOPY;  Surgeon: Rod Rayo MD;  Location: [facility];  Service: Endoscopy;  Laterality: N/A;    COLONOSCOPY N/A 6/25/2020    Procedure: COLONOSCOPY;  Surgeon: Rod Rayo MD;  Location: [facility];  Service: Endoscopy;  Laterality: N/A;    ESOPHAGOGASTRODUODENOSCOPY N/A 6/25/2020    Procedure: ESOPHAGOGASTRODUODENOSCOPY (EGD);  Surgeon: Rod Rayo MD;  Location: [facility];  Service: Endoscopy;  Laterality: N/A;    PROSTATE SURGERY  2008    radical prostatectomy    TONSILLECTOMY      Child    VASECTOMY       Review of patient's allergies indicates:   Allergen Reactions    Penicillins      Other reaction(s): Swelling    Sulfa (sulfonamide antibiotics)      Other reaction(s): Rash    Bactrim   [sulfamethoxazole-trimethoprim] Rash     Medication List with Changes/Refills   Current Medications    FLUOCINOLONE (SYNALAR) 0.01 % SHAM    Apply topically once daily at 6am.    FLUOCINONIDE (LIDEX) 0.05 % GEL    APPLY TO AFFECTED AREA ONCE DAILY    HYDROCORTISONE 2.5 % CREAM    APPLY TOPICALLY TWICE DAILY    HYDROXYZINE HCL (ATARAX) 25 MG TABLET    Take 1/2 to 1 tablet for itching as needed.    INSULIN SYRINGE-NEEDLE U-100 1 ML 31 GAUGE X 5/16 SYRG    Use as directed with ICI Therapy    KETOCONAZOLE (NIZORAL) 2 % SHAMPOO        LORATADINE (CLARITIN) 10 MG TABLET    Take 10 mg by mouth once daily.    MONTELUKAST (SINGULAIR) 10 MG TABLET    TAKE 1 TABLET BY MOUTH EVERY EVENING    PANTOPRAZOLE (PROTONIX) 40 MG TABLET    Take 1 tablet (40 mg total) by mouth once daily.    PAPAVERINE 30 MG/ML INJECTION    Add: Phentolamine 10 mg  Add: PGE1 100 mcg    Sig:  Inject 15 units (0.15 mls) as directed    RANITIDINE (ZANTAC) 300 MG TABLET    TAKE ONE TABLET BY MOUTH EVERY EVENING    SELENIUM SULFIDE 2.5 % LOTN    Shampoo lather 3-5 mins nightly for itching.    SUCRALFATE (CARAFATE) 1 GRAM TABLET    Take 1 tablet (1 g total) by mouth 4 (four) times daily.   Discontinued Medications    ALPRAZOLAM (XANAX) 0.25 MG TABLET    TAKE 1 TO 2 TABLETS BY MOUTH EVERY 4 HOURS AS NEEDED * MAY CAUSE DROWSINESS *    CLINDAMYCIN (CLEOCIN T) 1 % EXTERNAL SOLUTION    AAA bid    CLINDAMYCIN (CLEOCIN T) 1 % EXTERNAL SOLUTION    Apply topically 2 (two) times daily. To scalp and trunk      Social History     Tobacco Use    Smoking status: Former Smoker     Packs/day: 0.25     Years: 5.00     Pack years: 1.25     Types: Cigarettes, Cigars     Start date: 1977     Quit date: 1982     Years since quittin.6    Smokeless tobacco: Never Used   Substance Use Topics    Alcohol use: Yes     Alcohol/week: 12.0 standard drinks     Types: 12 drink(s) per week     Frequency: 4 or more times a week     Drinks per session: 1 or 2     Binge  frequency: Never     Comment: A couple a day      History reviewed. No pertinent family history.    I have reviewed the complete PMH, social history, surgical history, allergies and medications.  As well as family history.    Review of Systems   Constitutional: Negative for activity change and unexpected weight change.   HENT: Positive for hearing loss. Negative for rhinorrhea and trouble swallowing.    Eyes: Negative for discharge and visual disturbance.   Respiratory: Negative for chest tightness and wheezing.    Cardiovascular: Negative for chest pain and palpitations.   Gastrointestinal: Negative for blood in stool, constipation, diarrhea and vomiting.   Endocrine: Negative for polydipsia and polyuria.   Genitourinary: Negative for difficulty urinating, hematuria and urgency.   Musculoskeletal: Positive for arthralgias. Negative for joint swelling and neck pain.   Neurological: Negative for weakness and headaches.   Psychiatric/Behavioral: Negative for confusion and dysphoric mood.     Objective:   BP (!) 155/81   Pulse 79   Temp 98.4 °F (36.9 °C) (Oral)   Ht 6' (1.829 m)   Wt 102.4 kg (225 lb 12.8 oz)   BMI 30.62 kg/m²   Physical Exam  Vitals signs and nursing note reviewed.   Constitutional:       General: He is not in acute distress.     Appearance: He is well-developed.   HENT:      Head: Normocephalic and atraumatic.   Eyes:      Pupils: Pupils are equal, round, and reactive to light.   Neck:      Musculoskeletal: Normal range of motion and neck supple.   Cardiovascular:      Rate and Rhythm: Normal rate and regular rhythm.      Heart sounds: Normal heart sounds. No murmur.   Pulmonary:      Effort: Pulmonary effort is normal. No respiratory distress.      Breath sounds: Normal breath sounds. No wheezing.   Musculoskeletal: Normal range of motion.      Right shoulder: He exhibits tenderness and pain. He exhibits normal range of motion, no bony tenderness, no swelling, no effusion, no crepitus, no  deformity, no laceration, no spasm, normal pulse and normal strength.      Right knee: He exhibits normal range of motion, no swelling, no effusion, no ecchymosis, no deformity, no laceration, no erythema and normal alignment. Tenderness found.        Arms:         Legs:    Skin:     General: Skin is warm and dry.      Capillary Refill: Capillary refill takes less than 2 seconds.   Neurological:      Mental Status: He is alert and oriented to person, place, and time.      Cranial Nerves: No cranial nerve deficit.   Psychiatric:         Behavior: Behavior normal.         Assessment:     1. Encounter for general adult medical examination with abnormal findings    2. Encounter for long-term (current) use of medications    3. Encounter for lipid screening for cardiovascular disease    4. Chronic right shoulder pain    5. Chronic pain of right knee    6. AC joint arthropathy    7. Rotator cuff syndrome, right      MDM:   Patient here for annual wellness exam.  Patient also having aches and pains in his right shoulder right knee.  This noted specifically for those chronic conditions.  I have Reviewed and summarized old records.  I have performed thorough medication reconciliation today and discussed risk and benefits of each medication.  I have reviewed labs and discussed with patient.  All questions were answered.  I am requesting old records and will review them once they are available.  Previous PCP    I have signed for the following orders AND/OR meds.  Orders Placed This Encounter   Procedures    X-ray Shoulder 2 or More Views Right     Standing Status:   Future     Number of Occurrences:   1     Standing Expiration Date:   7/28/2021     Order Specific Question:   Reason for Exam:     Answer:   pain    CBC Without Differential     Standing Status:   Standing     Number of Occurrences:   99     Standing Expiration Date:   9/26/2021    TSH     Standing Status:   Standing     Number of Occurrences:   99     Standing  Expiration Date:   9/26/2021    Comprehensive metabolic panel     Standing Status:   Standing     Number of Occurrences:   99     Standing Expiration Date:   7/23/2040    Hemoglobin A1C     Standing Status:   Standing     Number of Occurrences:   99     Standing Expiration Date:   7/23/2040    Lipid Panel     Standing Status:   Standing     Number of Occurrences:   99     Standing Expiration Date:   7/23/2040           Follow up in about 1 year (around 7/28/2021), or if symptoms worsen or fail to improve, for Annual Wellness Exam.    If no improvement in symptoms or symptoms worsen, advised to call/follow-up at clinic or go to ER. Patient voiced understanding and all questions/concerns were addressed.     DISCLAIMER: This note was compiled by using a speech recognition dictation system and therefore please be aware that typographical / speech recognition errors can and do occur.  Please contact me if you see any errors specifically.    Dustin Reyes M.D.       Office: [phone]   [address] [place] [address]  FAX: [phone]

## 2020-07-28 NOTE — TELEPHONE ENCOUNTER
----- Message from Yuko Calderon sent at 7/28/2020 11:53 AM CDT -----  Type:  Patient Returning Call    Who Called: Dayton Cisneros   Who Left Message for Patient: Pt thinks your office tried calling him   Does the patient know what this is regarding?:   Would the patient rather a call back or a response via [facility]?    Call back   Best Call Back Number:  [phone]  Additional Information:  please call again//ramón

## 2020-07-28 NOTE — PROGRESS NOTES
Please CALL patient with results and Document verification.   [phone]    Advanced arthritis of the AC joint with mild arthritis of the shoulder joint.    I recommend proceeding with steroid injection and/or physical therapy.  If no improvement with these to treatments then we can proceed with MRI/orthopedics referral.

## 2020-07-30 PROBLEM — Z79.899 ENCOUNTER FOR LONG-TERM (CURRENT) USE OF MEDICATIONS: Chronic | Status: ACTIVE | Noted: 2020-07-28

## 2020-07-30 PROBLEM — M75.101 ROTATOR CUFF SYNDROME, RIGHT: Status: ACTIVE | Noted: 2020-07-30

## 2020-07-30 PROBLEM — M25.511 RIGHT SHOULDER PAIN: Chronic | Status: ACTIVE | Noted: 2020-07-28

## 2020-07-30 PROBLEM — M19.019 AC JOINT ARTHROPATHY: Status: ACTIVE | Noted: 2020-07-30

## 2020-07-30 PROBLEM — M25.561 RIGHT KNEE PAIN: Chronic | Status: ACTIVE | Noted: 2020-07-28

## 2020-07-30 NOTE — PROGRESS NOTES
This note is specifically for wellness visit performed today.     WELLNESS EXAM      Patient ID: Blayne Mendoza is a 64 y.o. male.  has a past medical history of Allergy, Basal cell carcinoma, Cancer, Colon polyp, and Erosive gastritis (6/25/2020).     Chief Complaint:  Encounter for wellness exam    Well Adult Physical: Patient here for a comprehensive physical exam.The patient reports right shoulder right knee pain problems.  See other note.  Do you take any herbs or supplements that were not prescribed by a doctor? no   Are you taking calcium supplements? no      History:  History of prostate cancer detected by physical by Dr. Méndez and removed several years ago.  PSA has been very low.   UROLOGIST: Kal Sprague MD  Date last PSA: Reviewed  Lab Results   Component Value Date    PSA 0.04 12/15/2015    PSA 0.02 12/09/2014    PSA 0.02 01/03/2013      No history of STDs    Health Maintenance Topics with due status: Not Due       Topic Last Completion Date    TETANUS VACCINE 11/25/2015    Influenza Vaccine 10/08/2019    Lipid Panel 06/23/2020    Colorectal Cancer Screening 06/25/2020        ==============================================  History reviewed.     Health Maintenance Due   Topic Date Due    Shingles Vaccine (1 of 2) 09/27/2004    Pneumococcal Vaccine (65+ High/Highest Risk) (2 of 2 - PPSV23) 09/27/2019    Abdominal Aortic Aneurysm Screening  09/27/2019     Patient defers vaccines at this time defers AAA screening  Past Medical History:  Past Medical History:   Diagnosis Date    Allergy     Basal cell carcinoma     Cancer     Prostate    Colon polyp     Erosive gastritis 6/25/2020     Past Surgical History:   Procedure Laterality Date    COLONOSCOPY N/A 12/1/2015    Procedure: COLONOSCOPY;  Surgeon: Rod Rayo MD;  Location: [facility];  Service: Endoscopy;  Laterality: N/A;    COLONOSCOPY N/A 6/25/2020    Procedure: COLONOSCOPY;  Surgeon: Rod Rayo MD;  Location: [facility];   Service: Endoscopy;  Laterality: N/A;    ESOPHAGOGASTRODUODENOSCOPY N/A 6/25/2020    Procedure: ESOPHAGOGASTRODUODENOSCOPY (EGD);  Surgeon: Rod Rayo MD;  Location[facility];  Service: Endoscopy;  Laterality: N/A;    PROSTATE SURGERY  2008    radical prostatectomy    TONSILLECTOMY      Child    VASECTOMY       Review of patient's allergies indicates:   Allergen Reactions    Penicillins      Other reaction(s): Swelling    Sulfa (sulfonamide antibiotics)      Other reaction(s): Rash    Bactrim  [sulfamethoxazole-trimethoprim] Rash     Current Outpatient Medications on File Prior to Visit   Medication Sig Dispense Refill    fluocinolone (SYNALAR) 0.01 % Sham Apply topically once daily at 6am. 120 mL 3    fluocinonide (LIDEX) 0.05 % gel APPLY TO AFFECTED AREA ONCE DAILY 60 g 3    hydrocortisone 2.5 % cream APPLY TOPICALLY TWICE DAILY 30 g 3    hydroxyzine HCL (ATARAX) 25 MG tablet Take 1/2 to 1 tablet for itching as needed. 60 tablet 11    insulin syringe-needle U-100 1 mL 31 gauge x 5/16 Syrg Use as directed with ICI Therapy 10 each 12    ketoconazole (NIZORAL) 2 % shampoo       loratadine (CLARITIN) 10 mg tablet Take 10 mg by mouth once daily.      montelukast (SINGULAIR) 10 mg tablet TAKE 1 TABLET BY MOUTH EVERY EVENING 90 tablet 4    pantoprazole (PROTONIX) 40 MG tablet Take 1 tablet (40 mg total) by mouth once daily. 90 tablet 3    papaverine 30 mg/mL injection Add: Phentolamine 10 mg  Add: PGE1 100 mcg    Sig:  Inject 15 units (0.15 mls) as directed 10 mL 11    ranitidine (ZANTAC) 300 MG tablet TAKE ONE TABLET BY MOUTH EVERY EVENING 90 tablet 4    selenium sulfide 2.5 % Lotn Shampoo lather 3-5 mins nightly for itching.      sucralfate (CARAFATE) 1 gram tablet Take 1 tablet (1 g total) by mouth 4 (four) times daily. 120 tablet 1     Current Facility-Administered Medications on File Prior to Visit   Medication Dose Route Frequency Provider Last Rate Last Dose    sodium chloride 0.9%  flush 10 mL  10 mL Intravenous PRN Rod Rayo MD         Social History     Socioeconomic History    Marital status:      Spouse name: Not on file    Number of children: Not on file    Years of education: Not on file    Highest education level: Not on file   Occupational History    Not on file   Social Needs    Financial resource strain: Not hard at all    Food insecurity     Worry: Never true     Inability: Never true    Transportation needs     Medical: No     Non-medical: No   Tobacco Use    Smoking status: Former Smoker     Packs/day: 0.25     Years: 5.00     Pack years: 1.25     Types: Cigarettes, Cigars     Start date: 1977     Quit date: 1982     Years since quittin.6    Smokeless tobacco: Never Used   Substance and Sexual Activity    Alcohol use: Yes     Alcohol/week: 12.0 standard drinks     Types: 12 drink(s) per week     Frequency: 4 or more times a week     Drinks per session: 1 or 2     Binge frequency: Never     Comment: A couple a day    Drug use: No    Sexual activity: Yes     Partners: Female   Lifestyle    Physical activity     Days per week: 2 days     Minutes per session: 150+ min    Stress: To some extent   Relationships    Social connections     Talks on phone: More than three times a week     Gets together: Twice a week     Attends [employer] service: Not on file     Active member of club or organization: Yes     Attends meetings of clubs or organizations: 1 to 4 times per year     Relationship status:    Other Topics Concern    Not on file   Social History Narrative    Not on file     History reviewed. No pertinent family history.    Vitals:    20 0944   BP: (!) 155/81   Pulse: 79   Temp: 98.4 °F (36.9 °C)      Body mass index is 30.62 kg/m².     Review of Systems   Constitutional: Negative for activity change and unexpected weight change.   HENT: Positive for hearing loss. Negative for rhinorrhea and trouble swallowing.    Eyes:  Negative for discharge and visual disturbance.   Respiratory: Negative for chest tightness and wheezing.    Cardiovascular: Negative for chest pain and palpitations.   Gastrointestinal: Negative for blood in stool, constipation, diarrhea and vomiting.   Endocrine: Negative for polydipsia and polyuria.   Genitourinary: Negative for difficulty urinating, hematuria and urgency.   Musculoskeletal: Positive for arthralgias. Negative for joint swelling and neck pain.   Neurological: Negative for weakness and headaches.   Psychiatric/Behavioral: Negative for confusion and dysphoric mood.          Objective:      Physical Exam  Vitals signs and nursing note reviewed.   Constitutional:       General: He is not in acute distress.     Appearance: He is well-developed.   HENT:      Head: Normocephalic and atraumatic.   Eyes:      Pupils: Pupils are equal, round, and reactive to light.   Neck:      Musculoskeletal: Normal range of motion and neck supple.   Cardiovascular:      Rate and Rhythm: Normal rate and regular rhythm.      Heart sounds: Normal heart sounds. No murmur.   Pulmonary:      Effort: Pulmonary effort is normal. No respiratory distress.      Breath sounds: Normal breath sounds. No wheezing.   Musculoskeletal: Normal range of motion.      Comments: See other note for knee and shoulder pain.   Skin:     General: Skin is warm and dry.      Capillary Refill: Capillary refill takes less than 2 seconds.   Neurological:      Mental Status: He is alert and oriented to person, place, and time.      Cranial Nerves: No cranial nerve deficit.   Psychiatric:         Behavior: Behavior normal.          Assessment / Plan:      1.  Routine health exam-patient here for annual wellness exam.  Labs ordered.  Health maintenance was reviewed and ordered.    Will plan to inject the right shoulder in the next 1 to 2 weeks.  See other note for further plan.    Complete history and physical was completed today.  Complete and thorough  medication reconciliation was performed.  Discussed risks and benefits of medications.  Advised patient on orders and health maintenance.  We discussed old records and old labs if available.  Will request any records not available through epic.  Continue current medications listed on your summary sheet.    All questions were answered. Patient had no further concerns. Advised of diagnoses and plan. Follow up as planned or return sooner if symptoms persist or worsen.     Orders Placed This Encounter   Procedures    X-ray Shoulder 2 or More Views Right     Standing Status:   Future     Number of Occurrences:   1     Standing Expiration Date:   7/28/2021     Order Specific Question:   Reason for Exam:     Answer:   pain    CBC Without Differential     Standing Status:   Standing     Number of Occurrences:   99     Standing Expiration Date:   9/26/2021    TSH     Standing Status:   Standing     Number of Occurrences:   99     Standing Expiration Date:   9/26/2021    Comprehensive metabolic panel     Standing Status:   Standing     Number of Occurrences:   99     Standing Expiration Date:   7/23/2040    Hemoglobin A1C     Standing Status:   Standing     Number of Occurrences:   99     Standing Expiration Date:   7/23/2040    Lipid Panel     Standing Status:   Standing     Number of Occurrences:   99     Standing Expiration Date:   7/23/2040             Dustin Reyes MD

## 2020-07-30 NOTE — ASSESSMENT & PLAN NOTE
X-ray reviewed.  Will plan for steroid injection of the right shoulder in the next 1 to 2 weeks.  Physical therapy also an option versus MRI and referral to Orthopedics if no improvement.

## 2020-10-08 ENCOUNTER — OFFICE VISIT (OUTPATIENT)
Dept: FAMILY MEDICINE | Facility: CLINIC | Age: 65
End: 2020-10-08
Payer: COMMERCIAL

## 2020-10-08 VITALS
RESPIRATION RATE: 18 BRPM | WEIGHT: 230 LBS | SYSTOLIC BLOOD PRESSURE: 129 MMHG | HEART RATE: 74 BPM | BODY MASS INDEX: 31.15 KG/M2 | DIASTOLIC BLOOD PRESSURE: 77 MMHG | HEIGHT: 72 IN | TEMPERATURE: 99 F

## 2020-10-08 DIAGNOSIS — L57.0 AK (ACTINIC KERATOSIS): Primary | ICD-10-CM
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
## 2020-10-08 PROCEDURE — 1159F PR MEDICATION LIST DOCUMENTED IN MEDICAL RECORD: ICD-10-PCS | Mod: S$GLB,,, | Performed by: FAMILY MEDICINE

## 2020-10-08 PROCEDURE — 3008F PR BODY MASS INDEX (BMI) DOCUMENTED: ICD-10-PCS | Mod: CPTII,S$GLB,, | Performed by: FAMILY MEDICINE

## 2020-10-08 PROCEDURE — 99213 OFFICE O/P EST LOW 20 MIN: CPT | Mod: S$GLB,,, | Performed by: FAMILY MEDICINE

## 2020-10-08 PROCEDURE — 3008F BODY MASS INDEX DOCD: CPT | Mod: CPTII,S$GLB,, | Performed by: FAMILY MEDICINE

## 2020-10-08 PROCEDURE — 99999 PR PBB SHADOW E&M-EST. PATIENT-LVL III: CPT | Mod: PBBFAC,,, | Performed by: FAMILY MEDICINE

## 2020-10-08 PROCEDURE — 1125F PR PAIN SEVERITY QUANTIFIED, PAIN PRESENT: ICD-10-PCS | Mod: S$GLB,,, | Performed by: FAMILY MEDICINE

## 2020-10-08 PROCEDURE — 99213 PR OFFICE/OUTPT VISIT, EST, LEVL III, 20-29 MIN: ICD-10-PCS | Mod: S$GLB,,, | Performed by: FAMILY MEDICINE

## 2020-10-08 PROCEDURE — 1101F PR PT FALLS ASSESS DOC 0-1 FALLS W/OUT INJ PAST YR: ICD-10-PCS | Mod: CPTII,S$GLB,, | Performed by: FAMILY MEDICINE

## 2020-10-08 PROCEDURE — 1159F MED LIST DOCD IN RCRD: CPT | Mod: S$GLB,,, | Performed by: FAMILY MEDICINE

## 2020-10-08 PROCEDURE — 1101F PT FALLS ASSESS-DOCD LE1/YR: CPT | Mod: CPTII,S$GLB,, | Performed by: FAMILY MEDICINE

## 2020-10-08 PROCEDURE — 99999 PR PBB SHADOW E&M-EST. PATIENT-LVL III: ICD-10-PCS | Mod: PBBFAC,,, | Performed by: FAMILY MEDICINE

## 2020-10-08 PROCEDURE — 1125F AMNT PAIN NOTED PAIN PRSNT: CPT | Mod: S$GLB,,, | Performed by: FAMILY MEDICINE

## 2020-10-09 ENCOUNTER — PATIENT MESSAGE (OUTPATIENT)
Dept: FAMILY MEDICINE | Facility: CLINIC | Age: 65
End: 2020-10-09

## 2020-10-12 ENCOUNTER — PATIENT MESSAGE (OUTPATIENT)
Dept: FAMILY MEDICINE | Facility: CLINIC | Age: 65
End: 2020-10-12

## 2020-10-12 ENCOUNTER — TELEPHONE (OUTPATIENT)
Dept: FAMILY MEDICINE | Facility: CLINIC | Age: 65
End: 2020-10-12

## 2020-10-12 NOTE — TELEPHONE ENCOUNTER
I called and spoke with him about his pathology.  He will recheck a colonoscopy in 5 years.   His EGD was benign.     Also, the patient requested a change of his PCP status to me at this time due to communication as he feels more comfortable with me as I have cared for him over the years in Dez's absence.

## 2020-10-12 NOTE — PROGRESS NOTES
I called and spoke with him about this.  He will repeat the colonoscopy in one year. [Follow-Up: _____] : a [unfilled] follow-up visit [Family Member] : family member [FreeTextEntry1] : 77 yr old right ankle achilles wound

## 2020-10-13 ENCOUNTER — PATIENT MESSAGE (OUTPATIENT)
Dept: FAMILY MEDICINE | Facility: CLINIC | Age: 65
End: 2020-10-13

## 2020-10-15 ENCOUNTER — IMMUNIZATION (OUTPATIENT)
Dept: FAMILY MEDICINE | Facility: CLINIC | Age: 65
End: 2020-10-15
Payer: COMMERCIAL

## 2020-10-15 PROCEDURE — 90694 FLU VACCINE - QUADRIVALENT - ADJUVANTED: ICD-10-PCS | Mod: S$GLB,,, | Performed by: FAMILY MEDICINE

## 2020-10-15 PROCEDURE — 90471 FLU VACCINE - QUADRIVALENT - ADJUVANTED: ICD-10-PCS | Mod: S$GLB,,, | Performed by: FAMILY MEDICINE

## 2020-10-15 PROCEDURE — 99999 PR PBB SHADOW E&M-EST. PATIENT-LVL II: CPT | Mod: PBBFAC,,,

## 2020-10-15 PROCEDURE — 90471 IMMUNIZATION ADMIN: CPT | Mod: S$GLB,,, | Performed by: FAMILY MEDICINE

## 2020-10-15 PROCEDURE — 99999 PR PBB SHADOW E&M-EST. PATIENT-LVL II: ICD-10-PCS | Mod: PBBFAC,,,

## 2020-10-15 PROCEDURE — 90694 VACC AIIV4 NO PRSRV 0.5ML IM: CPT | Mod: S$GLB,,, | Performed by: FAMILY MEDICINE

## 2021-01-28 ENCOUNTER — LAB VISIT (OUTPATIENT)
Dept: LAB | Facility: HOSPITAL | Age: 66
End: 2021-01-28
Attending: FAMILY MEDICINE
Payer: COMMERCIAL

## 2021-01-28 DIAGNOSIS — Z79.899 ENCOUNTER FOR LONG-TERM (CURRENT) USE OF MEDICATIONS: ICD-10-CM

## 2021-01-28 DIAGNOSIS — Z13.220 ENCOUNTER FOR LIPID SCREENING FOR CARDIOVASCULAR DISEASE: ICD-10-CM

## 2021-01-28 DIAGNOSIS — Z00.01 ENCOUNTER FOR GENERAL ADULT MEDICAL EXAMINATION WITH ABNORMAL FINDINGS: ICD-10-CM

## 2021-01-28 DIAGNOSIS — C61 PROSTATE CANCER: ICD-10-CM

## 2021-01-28 DIAGNOSIS — Z13.6 ENCOUNTER FOR LIPID SCREENING FOR CARDIOVASCULAR DISEASE: ICD-10-CM

## 2021-01-28 LAB
ERYTHROCYTE [DISTWIDTH] IN BLOOD BY AUTOMATED COUNT: 13.6 % (ref 11.5–14.5)
HCT VFR BLD AUTO: 45.7 % (ref 40–54)
HGB BLD-MCNC: 14.6 G/DL (ref 14–18)
MCH RBC QN AUTO: 31.5 PG (ref 27–31)
MCHC RBC AUTO-ENTMCNC: 31.9 G/DL (ref 32–36)
MCV RBC AUTO: 99 FL (ref 82–98)
PLATELET # BLD AUTO: 236 K/UL (ref 150–350)
PMV BLD AUTO: 11.2 FL (ref 9.2–12.9)
RBC # BLD AUTO: 4.64 M/UL (ref 4.6–6.2)
WBC # BLD AUTO: 5.87 K/UL (ref 3.9–12.7)

## 2021-01-28 PROCEDURE — 83036 HEMOGLOBIN GLYCOSYLATED A1C: CPT

## 2021-01-28 PROCEDURE — 84153 ASSAY OF PSA TOTAL: CPT | Mod: 91

## 2021-01-28 PROCEDURE — 36415 COLL VENOUS BLD VENIPUNCTURE: CPT | Mod: PO

## 2021-01-28 PROCEDURE — 80053 COMPREHEN METABOLIC PANEL: CPT

## 2021-01-28 PROCEDURE — 80061 LIPID PANEL: CPT

## 2021-01-28 PROCEDURE — 84443 ASSAY THYROID STIM HORMONE: CPT

## 2021-01-28 PROCEDURE — 84154 ASSAY OF PSA FREE: CPT

## 2021-01-28 PROCEDURE — 84153 ASSAY OF PSA TOTAL: CPT

## 2021-01-28 PROCEDURE — 85027 COMPLETE CBC AUTOMATED: CPT

## 2021-01-29 ENCOUNTER — TELEPHONE (OUTPATIENT)
Dept: FAMILY MEDICINE | Facility: CLINIC | Age: 66
End: 2021-01-29

## 2021-01-29 DIAGNOSIS — E78.5 HYPERLIPIDEMIA, UNSPECIFIED HYPERLIPIDEMIA TYPE: Primary | ICD-10-CM

## 2021-01-29 DIAGNOSIS — Z91.89 RISK FOR CORONARY ARTERY DISEASE BETWEEN 10% AND 20% IN NEXT 10 YEARS PER FRAMINGHAM SCORE: ICD-10-CM

## 2021-01-29 LAB
ALBUMIN SERPL BCP-MCNC: 4.2 G/DL (ref 3.5–5.2)
ALP SERPL-CCNC: 74 U/L (ref 55–135)
ALT SERPL W/O P-5'-P-CCNC: 22 U/L (ref 10–44)
ANION GAP SERPL CALC-SCNC: 7 MMOL/L (ref 8–16)
AST SERPL-CCNC: 19 U/L (ref 10–40)
BILIRUB SERPL-MCNC: 0.4 MG/DL (ref 0.1–1)
BUN SERPL-MCNC: 23 MG/DL (ref 8–23)
CALCIUM SERPL-MCNC: 9.1 MG/DL (ref 8.7–10.5)
CHLORIDE SERPL-SCNC: 108 MMOL/L (ref 95–110)
CHOLEST SERPL-MCNC: 221 MG/DL (ref 120–199)
CHOLEST/HDLC SERPL: 5.1 {RATIO} (ref 2–5)
CO2 SERPL-SCNC: 27 MMOL/L (ref 23–29)
COMPLEXED PSA SERPL-MCNC: 0.02 NG/ML (ref 0–4)
CREAT SERPL-MCNC: 0.8 MG/DL (ref 0.5–1.4)
EST. GFR  (AFRICAN AMERICAN): >60 ML/MIN/1.73 M^2
EST. GFR  (NON AFRICAN AMERICAN): >60 ML/MIN/1.73 M^2
ESTIMATED AVG GLUCOSE: 108 MG/DL (ref 68–131)
GLUCOSE SERPL-MCNC: 98 MG/DL (ref 70–110)
HBA1C MFR BLD: 5.4 % (ref 4–5.6)
HDLC SERPL-MCNC: 43 MG/DL (ref 40–75)
HDLC SERPL: 19.5 % (ref 20–50)
LDLC SERPL CALC-MCNC: 151.8 MG/DL (ref 63–159)
NONHDLC SERPL-MCNC: 178 MG/DL
POTASSIUM SERPL-SCNC: 4.4 MMOL/L (ref 3.5–5.1)
PROSTATE SPECIFIC ANTIGEN, TOTAL: 0.01 NG/ML (ref 0–4)
PROT SERPL-MCNC: 7.3 G/DL (ref 6–8.4)
PSA FREE MFR SERPL: 100 %
PSA FREE SERPL-MCNC: 0.01 NG/ML (ref 0.01–1.5)
SODIUM SERPL-SCNC: 142 MMOL/L (ref 136–145)
TRIGL SERPL-MCNC: 131 MG/DL (ref 30–150)
TSH SERPL DL<=0.005 MIU/L-ACNC: 1.9 UIU/ML (ref 0.4–4)

## 2021-01-29 RX ORDER — ATORVASTATIN CALCIUM 40 MG/1
40 TABLET, FILM COATED ORAL DAILY
Qty: 90 TABLET | Refills: 0 | Status: SHIPPED | OUTPATIENT
Start: 2021-01-29 | End: 2021-07-28

## 2021-02-01 ENCOUNTER — PATIENT MESSAGE (OUTPATIENT)
Dept: FAMILY MEDICINE | Facility: CLINIC | Age: 66
End: 2021-02-01

## 2021-02-03 ENCOUNTER — OFFICE VISIT (OUTPATIENT)
Dept: FAMILY MEDICINE | Facility: CLINIC | Age: 66
End: 2021-02-03
Payer: COMMERCIAL

## 2021-02-03 DIAGNOSIS — S83.91XA SPRAIN OF RIGHT KNEE, UNSPECIFIED LIGAMENT, INITIAL ENCOUNTER: Primary | ICD-10-CM

## 2021-02-03 PROCEDURE — 1159F MED LIST DOCD IN RCRD: CPT | Mod: ,,, | Performed by: FAMILY MEDICINE

## 2021-02-03 PROCEDURE — 1159F PR MEDICATION LIST DOCUMENTED IN MEDICAL RECORD: ICD-10-PCS | Mod: ,,, | Performed by: FAMILY MEDICINE

## 2021-02-03 PROCEDURE — 99213 PR OFFICE/OUTPT VISIT, EST, LEVL III, 20-29 MIN: ICD-10-PCS | Mod: 95,,, | Performed by: FAMILY MEDICINE

## 2021-02-03 PROCEDURE — 99213 OFFICE O/P EST LOW 20 MIN: CPT | Mod: 95,,, | Performed by: FAMILY MEDICINE

## 2021-02-03 RX ORDER — MELOXICAM 15 MG/1
15 TABLET ORAL DAILY
Qty: 30 TABLET | Refills: 1 | Status: SHIPPED | OUTPATIENT
Start: 2021-02-03 | End: 2021-07-28

## 2021-02-03 RX ORDER — MELOXICAM 15 MG/1
15 TABLET ORAL DAILY
Qty: 30 TABLET | Refills: 1 | Status: CANCELLED | OUTPATIENT
Start: 2021-02-03

## 2021-04-29 ENCOUNTER — PATIENT MESSAGE (OUTPATIENT)
Dept: RESEARCH | Facility: HOSPITAL | Age: 66
End: 2021-04-29

## 2021-05-05 ENCOUNTER — PATIENT MESSAGE (OUTPATIENT)
Dept: FAMILY MEDICINE | Facility: CLINIC | Age: 66
End: 2021-05-05

## 2021-05-05 RX ORDER — METHYLPREDNISOLONE 4 MG/1
TABLET ORAL
Qty: 1 PACKAGE | Refills: 0 | Status: SHIPPED | OUTPATIENT
Start: 2021-05-05 | End: 2021-05-26

## 2021-05-06 ENCOUNTER — OFFICE VISIT (OUTPATIENT)
Dept: FAMILY MEDICINE | Facility: CLINIC | Age: 66
End: 2021-05-06
Payer: COMMERCIAL

## 2021-05-06 ENCOUNTER — TELEPHONE (OUTPATIENT)
Dept: FAMILY MEDICINE | Facility: CLINIC | Age: 66
End: 2021-05-06

## 2021-05-06 DIAGNOSIS — J06.9 UPPER RESPIRATORY TRACT INFECTION, UNSPECIFIED TYPE: Primary | ICD-10-CM

## 2021-05-06 DIAGNOSIS — E78.5 HYPERLIPIDEMIA, UNSPECIFIED HYPERLIPIDEMIA TYPE: ICD-10-CM

## 2021-05-06 PROCEDURE — 1159F MED LIST DOCD IN RCRD: CPT | Mod: ,,, | Performed by: FAMILY MEDICINE

## 2021-05-06 PROCEDURE — 1159F PR MEDICATION LIST DOCUMENTED IN MEDICAL RECORD: ICD-10-PCS | Mod: ,,, | Performed by: FAMILY MEDICINE

## 2021-05-06 PROCEDURE — 99213 PR OFFICE/OUTPT VISIT, EST, LEVL III, 20-29 MIN: ICD-10-PCS | Mod: 95,,, | Performed by: FAMILY MEDICINE

## 2021-05-06 PROCEDURE — 99213 OFFICE O/P EST LOW 20 MIN: CPT | Mod: 95,,, | Performed by: FAMILY MEDICINE

## 2021-05-06 RX ORDER — FAMOTIDINE 40 MG/1
40 TABLET, FILM COATED ORAL DAILY
Qty: 30 TABLET | Refills: 11 | Status: SHIPPED | OUTPATIENT
Start: 2021-05-06 | End: 2021-07-28

## 2021-07-13 ENCOUNTER — PATIENT MESSAGE (OUTPATIENT)
Dept: FAMILY MEDICINE | Facility: CLINIC | Age: 66
End: 2021-07-13

## 2021-07-26 ENCOUNTER — PATIENT MESSAGE (OUTPATIENT)
Dept: FAMILY MEDICINE | Facility: CLINIC | Age: 66
End: 2021-07-26

## 2021-07-26 ENCOUNTER — OFFICE VISIT (OUTPATIENT)
Dept: FAMILY MEDICINE | Facility: CLINIC | Age: 66
End: 2021-07-26
Payer: COMMERCIAL

## 2021-07-26 VITALS
TEMPERATURE: 99 F | HEART RATE: 73 BPM | DIASTOLIC BLOOD PRESSURE: 87 MMHG | HEIGHT: 72 IN | SYSTOLIC BLOOD PRESSURE: 134 MMHG | WEIGHT: 237.81 LBS | BODY MASS INDEX: 32.21 KG/M2

## 2021-07-26 DIAGNOSIS — L91.8 SKIN TAG: ICD-10-CM

## 2021-07-26 DIAGNOSIS — L57.0 AK (ACTINIC KERATOSIS): Primary | ICD-10-CM

## 2021-07-26 PROCEDURE — 3008F PR BODY MASS INDEX (BMI) DOCUMENTED: ICD-10-PCS | Mod: CPTII,S$GLB,, | Performed by: FAMILY MEDICINE

## 2021-07-26 PROCEDURE — 99213 OFFICE O/P EST LOW 20 MIN: CPT | Mod: S$GLB,,, | Performed by: FAMILY MEDICINE

## 2021-07-26 PROCEDURE — 1126F PR PAIN SEVERITY QUANTIFIED, NO PAIN PRESENT: ICD-10-PCS | Mod: CPTII,S$GLB,, | Performed by: FAMILY MEDICINE

## 2021-07-26 PROCEDURE — 99999 PR PBB SHADOW E&M-EST. PATIENT-LVL III: ICD-10-PCS | Mod: PBBFAC,,, | Performed by: FAMILY MEDICINE

## 2021-07-26 PROCEDURE — 1101F PT FALLS ASSESS-DOCD LE1/YR: CPT | Mod: CPTII,S$GLB,, | Performed by: FAMILY MEDICINE

## 2021-07-26 PROCEDURE — 99999 PR PBB SHADOW E&M-EST. PATIENT-LVL III: CPT | Mod: PBBFAC,,, | Performed by: FAMILY MEDICINE

## 2021-07-26 PROCEDURE — 1159F MED LIST DOCD IN RCRD: CPT | Mod: CPTII,S$GLB,, | Performed by: FAMILY MEDICINE

## 2021-07-26 PROCEDURE — 3008F BODY MASS INDEX DOCD: CPT | Mod: CPTII,S$GLB,, | Performed by: FAMILY MEDICINE

## 2021-07-26 PROCEDURE — 1101F PR PT FALLS ASSESS DOC 0-1 FALLS W/OUT INJ PAST YR: ICD-10-PCS | Mod: CPTII,S$GLB,, | Performed by: FAMILY MEDICINE

## 2021-07-26 PROCEDURE — 3288F PR FALLS RISK ASSESSMENT DOCUMENTED: ICD-10-PCS | Mod: CPTII,S$GLB,, | Performed by: FAMILY MEDICINE

## 2021-07-26 PROCEDURE — 99213 PR OFFICE/OUTPT VISIT, EST, LEVL III, 20-29 MIN: ICD-10-PCS | Mod: S$GLB,,, | Performed by: FAMILY MEDICINE

## 2021-07-26 PROCEDURE — 1159F PR MEDICATION LIST DOCUMENTED IN MEDICAL RECORD: ICD-10-PCS | Mod: CPTII,S$GLB,, | Performed by: FAMILY MEDICINE

## 2021-07-26 PROCEDURE — 3288F FALL RISK ASSESSMENT DOCD: CPT | Mod: CPTII,S$GLB,, | Performed by: FAMILY MEDICINE

## 2021-07-26 PROCEDURE — 1126F AMNT PAIN NOTED NONE PRSNT: CPT | Mod: CPTII,S$GLB,, | Performed by: FAMILY MEDICINE

## 2021-07-28 ENCOUNTER — OFFICE VISIT (OUTPATIENT)
Dept: FAMILY MEDICINE | Facility: CLINIC | Age: 66
End: 2021-07-28
Payer: COMMERCIAL

## 2021-07-28 ENCOUNTER — LAB VISIT (OUTPATIENT)
Dept: LAB | Facility: HOSPITAL | Age: 66
End: 2021-07-28
Attending: FAMILY MEDICINE
Payer: COMMERCIAL

## 2021-07-28 VITALS
HEART RATE: 81 BPM | RESPIRATION RATE: 17 BRPM | HEIGHT: 72 IN | TEMPERATURE: 99 F | DIASTOLIC BLOOD PRESSURE: 86 MMHG | WEIGHT: 238.38 LBS | BODY MASS INDEX: 32.29 KG/M2 | SYSTOLIC BLOOD PRESSURE: 138 MMHG

## 2021-07-28 DIAGNOSIS — Z13.6 ENCOUNTER FOR LIPID SCREENING FOR CARDIOVASCULAR DISEASE: ICD-10-CM

## 2021-07-28 DIAGNOSIS — Z00.01 ENCOUNTER FOR GENERAL ADULT MEDICAL EXAMINATION WITH ABNORMAL FINDINGS: ICD-10-CM

## 2021-07-28 DIAGNOSIS — E88.819 INSULIN RESISTANCE: ICD-10-CM

## 2021-07-28 DIAGNOSIS — Z13.220 ENCOUNTER FOR LIPID SCREENING FOR CARDIOVASCULAR DISEASE: ICD-10-CM

## 2021-07-28 DIAGNOSIS — Z79.899 ENCOUNTER FOR LONG-TERM (CURRENT) USE OF MEDICATIONS: ICD-10-CM

## 2021-07-28 DIAGNOSIS — Z00.00 WELL ADULT EXAM: Primary | ICD-10-CM

## 2021-07-28 DIAGNOSIS — C61 PROSTATE CANCER: ICD-10-CM

## 2021-07-28 DIAGNOSIS — H91.90 HEARING LOSS, UNSPECIFIED HEARING LOSS TYPE, UNSPECIFIED LATERALITY: ICD-10-CM

## 2021-07-28 DIAGNOSIS — Z12.5 ENCOUNTER FOR PROSTATE CANCER SCREENING: ICD-10-CM

## 2021-07-28 LAB
ALBUMIN SERPL BCP-MCNC: 4.4 G/DL (ref 3.5–5.2)
ALP SERPL-CCNC: 78 U/L (ref 55–135)
ALT SERPL W/O P-5'-P-CCNC: 24 U/L (ref 10–44)
ANION GAP SERPL CALC-SCNC: 8 MMOL/L (ref 8–16)
AST SERPL-CCNC: 17 U/L (ref 10–40)
BILIRUB SERPL-MCNC: 0.6 MG/DL (ref 0.1–1)
BUN SERPL-MCNC: 18 MG/DL (ref 8–23)
CALCIUM SERPL-MCNC: 10 MG/DL (ref 8.7–10.5)
CHLORIDE SERPL-SCNC: 107 MMOL/L (ref 95–110)
CHOLEST SERPL-MCNC: 227 MG/DL (ref 120–199)
CHOLEST/HDLC SERPL: 4.8 {RATIO} (ref 2–5)
CO2 SERPL-SCNC: 27 MMOL/L (ref 23–29)
CREAT SERPL-MCNC: 1 MG/DL (ref 0.5–1.4)
ERYTHROCYTE [DISTWIDTH] IN BLOOD BY AUTOMATED COUNT: 13.8 % (ref 11.5–14.5)
EST. GFR  (AFRICAN AMERICAN): >60 ML/MIN/1.73 M^2
EST. GFR  (NON AFRICAN AMERICAN): >60 ML/MIN/1.73 M^2
GLUCOSE SERPL-MCNC: 104 MG/DL (ref 70–110)
HCT VFR BLD AUTO: 46 % (ref 40–54)
HDLC SERPL-MCNC: 47 MG/DL (ref 40–75)
HDLC SERPL: 20.7 % (ref 20–50)
HGB BLD-MCNC: 15 G/DL (ref 14–18)
INSULIN COLLECTION INTERVAL: NORMAL
INSULIN SERPL-ACNC: 10.7 UU/ML
LDLC SERPL CALC-MCNC: 157.6 MG/DL (ref 63–159)
MCH RBC QN AUTO: 31.3 PG (ref 27–31)
MCHC RBC AUTO-ENTMCNC: 32.6 G/DL (ref 32–36)
MCV RBC AUTO: 96 FL (ref 82–98)
NONHDLC SERPL-MCNC: 180 MG/DL
PLATELET # BLD AUTO: 242 K/UL (ref 150–450)
PMV BLD AUTO: 11 FL (ref 9.2–12.9)
POTASSIUM SERPL-SCNC: 4.9 MMOL/L (ref 3.5–5.1)
PROT SERPL-MCNC: 7.8 G/DL (ref 6–8.4)
RBC # BLD AUTO: 4.8 M/UL (ref 4.6–6.2)
SODIUM SERPL-SCNC: 142 MMOL/L (ref 136–145)
TRIGL SERPL-MCNC: 112 MG/DL (ref 30–150)
TSH SERPL DL<=0.005 MIU/L-ACNC: 1.58 UIU/ML (ref 0.4–4)
WBC # BLD AUTO: 5.68 K/UL (ref 3.9–12.7)

## 2021-07-28 PROCEDURE — 1126F PR PAIN SEVERITY QUANTIFIED, NO PAIN PRESENT: ICD-10-PCS | Mod: CPTII,S$GLB,, | Performed by: FAMILY MEDICINE

## 2021-07-28 PROCEDURE — 1160F RVW MEDS BY RX/DR IN RCRD: CPT | Mod: CPTII,S$GLB,, | Performed by: FAMILY MEDICINE

## 2021-07-28 PROCEDURE — 99397 PR PREVENTIVE VISIT,EST,65 & OVER: ICD-10-PCS | Mod: S$GLB,,, | Performed by: FAMILY MEDICINE

## 2021-07-28 PROCEDURE — 3008F PR BODY MASS INDEX (BMI) DOCUMENTED: ICD-10-PCS | Mod: CPTII,S$GLB,, | Performed by: FAMILY MEDICINE

## 2021-07-28 PROCEDURE — 3288F FALL RISK ASSESSMENT DOCD: CPT | Mod: CPTII,S$GLB,, | Performed by: FAMILY MEDICINE

## 2021-07-28 PROCEDURE — 3288F PR FALLS RISK ASSESSMENT DOCUMENTED: ICD-10-PCS | Mod: CPTII,S$GLB,, | Performed by: FAMILY MEDICINE

## 2021-07-28 PROCEDURE — 1159F PR MEDICATION LIST DOCUMENTED IN MEDICAL RECORD: ICD-10-PCS | Mod: CPTII,S$GLB,, | Performed by: FAMILY MEDICINE

## 2021-07-28 PROCEDURE — 1159F MED LIST DOCD IN RCRD: CPT | Mod: CPTII,S$GLB,, | Performed by: FAMILY MEDICINE

## 2021-07-28 PROCEDURE — 36415 COLL VENOUS BLD VENIPUNCTURE: CPT | Mod: PO | Performed by: FAMILY MEDICINE

## 2021-07-28 PROCEDURE — 1101F PT FALLS ASSESS-DOCD LE1/YR: CPT | Mod: CPTII,S$GLB,, | Performed by: FAMILY MEDICINE

## 2021-07-28 PROCEDURE — 80061 LIPID PANEL: CPT | Performed by: FAMILY MEDICINE

## 2021-07-28 PROCEDURE — 83525 ASSAY OF INSULIN: CPT | Performed by: FAMILY MEDICINE

## 2021-07-28 PROCEDURE — 1160F PR REVIEW ALL MEDS BY PRESCRIBER/CLIN PHARMACIST DOCUMENTED: ICD-10-PCS | Mod: CPTII,S$GLB,, | Performed by: FAMILY MEDICINE

## 2021-07-28 PROCEDURE — 3008F BODY MASS INDEX DOCD: CPT | Mod: CPTII,S$GLB,, | Performed by: FAMILY MEDICINE

## 2021-07-28 PROCEDURE — 80053 COMPREHEN METABOLIC PANEL: CPT | Performed by: FAMILY MEDICINE

## 2021-07-28 PROCEDURE — 84153 ASSAY OF PSA TOTAL: CPT | Performed by: FAMILY MEDICINE

## 2021-07-28 PROCEDURE — 85027 COMPLETE CBC AUTOMATED: CPT | Performed by: FAMILY MEDICINE

## 2021-07-28 PROCEDURE — 99999 PR PBB SHADOW E&M-EST. PATIENT-LVL IV: ICD-10-PCS | Mod: PBBFAC,,, | Performed by: FAMILY MEDICINE

## 2021-07-28 PROCEDURE — 1126F AMNT PAIN NOTED NONE PRSNT: CPT | Mod: CPTII,S$GLB,, | Performed by: FAMILY MEDICINE

## 2021-07-28 PROCEDURE — 1101F PR PT FALLS ASSESS DOC 0-1 FALLS W/OUT INJ PAST YR: ICD-10-PCS | Mod: CPTII,S$GLB,, | Performed by: FAMILY MEDICINE

## 2021-07-28 PROCEDURE — 84443 ASSAY THYROID STIM HORMONE: CPT | Performed by: FAMILY MEDICINE

## 2021-07-28 PROCEDURE — 99999 PR PBB SHADOW E&M-EST. PATIENT-LVL IV: CPT | Mod: PBBFAC,,, | Performed by: FAMILY MEDICINE

## 2021-07-28 PROCEDURE — 99397 PER PM REEVAL EST PAT 65+ YR: CPT | Mod: S$GLB,,, | Performed by: FAMILY MEDICINE

## 2021-07-28 PROCEDURE — 83036 HEMOGLOBIN GLYCOSYLATED A1C: CPT | Performed by: FAMILY MEDICINE

## 2021-07-29 LAB
COMPLEXED PSA SERPL-MCNC: 0.02 NG/ML (ref 0–4)
ESTIMATED AVG GLUCOSE: 114 MG/DL (ref 68–131)
HBA1C MFR BLD: 5.6 % (ref 4–5.6)

## 2021-07-30 ENCOUNTER — PATIENT MESSAGE (OUTPATIENT)
Dept: FAMILY MEDICINE | Facility: CLINIC | Age: 66
End: 2021-07-30

## 2021-07-30 ENCOUNTER — TELEPHONE (OUTPATIENT)
Dept: FAMILY MEDICINE | Facility: CLINIC | Age: 66
End: 2021-07-30

## 2021-07-30 DIAGNOSIS — C61 PROSTATE CANCER: Primary | ICD-10-CM

## 2021-07-30 RX ORDER — SEMAGLUTIDE 1.34 MG/ML
INJECTION, SOLUTION SUBCUTANEOUS
Qty: 2 PEN | Refills: 3 | Status: SHIPPED | OUTPATIENT
Start: 2021-07-30 | End: 2021-09-28

## 2021-08-06 ENCOUNTER — PATIENT MESSAGE (OUTPATIENT)
Dept: FAMILY MEDICINE | Facility: CLINIC | Age: 66
End: 2021-08-06

## 2021-08-07 ENCOUNTER — PATIENT MESSAGE (OUTPATIENT)
Dept: FAMILY MEDICINE | Facility: CLINIC | Age: 66
End: 2021-08-07

## 2021-10-22 ENCOUNTER — PATIENT MESSAGE (OUTPATIENT)
Dept: FAMILY MEDICINE | Facility: CLINIC | Age: 66
End: 2021-10-22
Payer: COMMERCIAL

## 2021-11-05 ENCOUNTER — PATIENT MESSAGE (OUTPATIENT)
Dept: FAMILY MEDICINE | Facility: CLINIC | Age: 66
End: 2021-11-05
Payer: COMMERCIAL

## 2021-11-05 DIAGNOSIS — J06.9 UPPER RESPIRATORY TRACT INFECTION, UNSPECIFIED TYPE: Primary | ICD-10-CM

## 2021-11-05 RX ORDER — METHYLPREDNISOLONE 4 MG/1
TABLET ORAL
Qty: 21 TABLET | Refills: 0 | Status: SHIPPED | OUTPATIENT
Start: 2021-11-05 | End: 2022-05-09

## 2021-11-05 RX ORDER — AZITHROMYCIN 250 MG/1
TABLET, FILM COATED ORAL
Qty: 6 TABLET | Refills: 0 | Status: SHIPPED | OUTPATIENT
Start: 2021-11-05 | End: 2021-11-10

## 2021-12-28 RX ORDER — MONTELUKAST SODIUM 10 MG/1
TABLET ORAL
Qty: 90 TABLET | Refills: 0 | Status: SHIPPED | OUTPATIENT
Start: 2021-12-28 | End: 2022-07-07

## 2022-04-19 ENCOUNTER — PATIENT MESSAGE (OUTPATIENT)
Dept: FAMILY MEDICINE | Facility: CLINIC | Age: 67
End: 2022-04-19
Payer: COMMERCIAL

## 2022-04-19 DIAGNOSIS — Z23 NEED FOR VACCINATION: Primary | ICD-10-CM

## 2022-04-19 DIAGNOSIS — Z76.89 REFERRAL OF PATIENT: ICD-10-CM

## 2022-04-19 NOTE — TELEPHONE ENCOUNTER
I received your message which was reviewed along with the the medication list and allergies that we have below.  Please review it for accuracy to make sure that we have the most recent records on your history.     Based on this, the following orders were placed AND/OR medicines were sent in.     Orders Placed This Encounter   Procedures    (In Office Administered) Zoster Recombinant Vaccine     Standing Status:   Future     Standing Expiration Date:   10/19/2023    Ambulatory referral/consult to Ophthalmology     Standing Status:   Future     Standing Expiration Date:   5/19/2023     Referral Priority:   Routine     Referral Type:   Consultation     Referral Reason:   Specialty Services Required     Requested Specialty:   Ophthalmology     Number of Visits Requested:   1       Medications written and sent at this time include:       Your pharmacy(ies) of choice at this time on record include the list below and any medications would have been sent to the one at the top.    [facility] [address] [address]  Phone: [phone] Fax: [phone]      Thank you for choosing us as your healthcare provider!  Dr. Dustin Reyes    ALLERGY LIST  Review of patient's allergies indicates:   Allergen Reactions    Penicillins      Other reaction(s): Swelling    Sulfa (sulfonamide antibiotics)      Other reaction(s): Rash    Bactrim  [sulfamethoxazole-trimethoprim] Rash       MEDICATION LIST  Current Outpatient Medications on File Prior to Visit   Medication Sig Dispense Refill    doxycycline (MONODOX) 100 MG capsule TAKE ONE CAPSULE BY MOUTH ONCE DAILY 30 capsule 3    fluocinolone (SYNALAR) 0.01 % Sham Apply topically once daily at 6am. 120 mL 3    hydrOXYzine HCL (ATARAX) 25 MG tablet Take 1/2 to 1 tablet for itching as needed. **MAY CAUSE DROWSINESS** 60 tablet 11    ketoconazole (NIZORAL) 2 % shampoo       methylPREDNISolone (MEDROL  DOSEPACK) 4 mg tablet follow package directions 21 tablet 0    montelukast (SINGULAIR) 10 mg tablet TAKE ONE TABLET BY MOUTH EVERY EVENING 90 tablet 0     No current facility-administered medications on file prior to visit.       HEALTH MAINTENANCE THAT IS OVERDUE OR NEEDS TO BE UPDATED ON OUR CHART IS LISTED BELOW.  IF YOU HAVE HAD IT DONE ELSEWHERE, PLEASE SEND US DATES AND RECORDS IF YOU HAVE THEM TO MAKE YOUR CHART ACCURATE.  IF YOU HAVE NOT HAD THESE DONE AND ARE READY FOR US TO SCHEDULE THEM, PLEASE SEND US A MESSAGE.  Health Maintenance Due   Topic Date Due    Shingles Vaccine (1 of 2) Never done    Abdominal Aortic Aneurysm Screening  Never done    Pneumococcal Vaccines (Age 65+) (2 - PPSV23 or PCV20) 02/04/2020    COVID-19 Vaccine (3 - Booster for Moderna series) 08/04/2021       DISCLAIMER: This note was compiled by using a speech recognition dictation system and therefore please be aware that typographical / speech recognition errors can and do occur.  Please contact me if you see any errors specifically.    Dustin Reyes MD  We Offer Telehealth & Same Day Appointments!   Book your Telehealth appointment with me through my nurse or   Clinic appointments on [employer]!  Ruhber-[phone]     Check out my Facebook Page and Follow Me at: CLICK HERE    Check out my website at [employer] by clicking on: CLICK HERE    To Schedule appointments online, go to [employer]: CLICK HERE     Location: [email or web address]    [address] [place] [address]    FAX: [phone]

## 2022-04-21 ENCOUNTER — CLINICAL SUPPORT (OUTPATIENT)
Dept: FAMILY MEDICINE | Facility: CLINIC | Age: 67
End: 2022-04-21
Payer: COMMERCIAL

## 2022-04-21 DIAGNOSIS — Z23 NEED FOR VACCINATION: ICD-10-CM

## 2022-04-21 PROCEDURE — 99999 PR PBB SHADOW E&M-EST. PATIENT-LVL II: ICD-10-PCS | Mod: PBBFAC,,,

## 2022-04-21 PROCEDURE — 90471 ZOSTER RECOMBINANT VACCINE: ICD-10-PCS | Mod: S$GLB,,, | Performed by: FAMILY MEDICINE

## 2022-04-21 PROCEDURE — 90750 ZOSTER RECOMBINANT VACCINE: ICD-10-PCS | Mod: S$GLB,,, | Performed by: FAMILY MEDICINE

## 2022-04-21 PROCEDURE — 99999 PR PBB SHADOW E&M-EST. PATIENT-LVL II: CPT | Mod: PBBFAC,,,

## 2022-04-21 PROCEDURE — 90471 IMMUNIZATION ADMIN: CPT | Mod: S$GLB,,, | Performed by: FAMILY MEDICINE

## 2022-04-21 PROCEDURE — 90750 HZV VACC RECOMBINANT IM: CPT | Mod: S$GLB,,, | Performed by: FAMILY MEDICINE

## 2022-04-21 NOTE — PROGRESS NOTES
Pt received injection in right deltoid. Pt instructed to wait 15 min post injection. Pt verb understanding and tolerated well.

## 2022-05-09 RX ORDER — DICLOFENAC SODIUM 75 MG/1
75 TABLET, DELAYED RELEASE ORAL 2 TIMES DAILY
Qty: 60 TABLET | Refills: 0 | Status: SHIPPED | OUTPATIENT
Start: 2022-05-09 | End: 2022-07-07

## 2022-05-09 RX ORDER — METHYLPREDNISOLONE 4 MG/1
TABLET ORAL
Qty: 1 EACH | Refills: 0 | Status: SHIPPED | OUTPATIENT
Start: 2022-05-09 | End: 2022-05-30

## 2022-05-31 ENCOUNTER — PATIENT MESSAGE (OUTPATIENT)
Dept: FAMILY MEDICINE | Facility: CLINIC | Age: 67
End: 2022-05-31
Payer: COMMERCIAL

## 2022-07-07 RX ORDER — DICLOFENAC SODIUM 75 MG/1
75 TABLET, DELAYED RELEASE ORAL 2 TIMES DAILY
Qty: 60 TABLET | Refills: 12 | Status: SHIPPED | OUTPATIENT
Start: 2022-07-07 | End: 2022-11-21

## 2022-07-07 RX ORDER — MONTELUKAST SODIUM 10 MG/1
TABLET ORAL
Qty: 90 TABLET | Refills: 4 | Status: SHIPPED | OUTPATIENT
Start: 2022-07-07 | End: 2023-09-07

## 2022-07-07 NOTE — TELEPHONE ENCOUNTER
No new care gaps identified.  [facility] Embedded Care Gaps. Reference number: [id number]. 7/07/2022   9:57:21 AM ISMAELT

## 2022-07-07 NOTE — TELEPHONE ENCOUNTER
Refill Routing Note   Medication(s) are not appropriate for processing by [facility] for the following reason(s):      - Medication not previously prescribed by PCP    [facility] action(s):  Defer  Quick Discontinue          Medication reconciliation completed: No     Appointments  past 12m or future 3m with PCP    Date Provider   Last Visit   7/28/2021 Dustin Reyes MD   Next Visit   7/28/2022 Dustin Reyes MD   ED visits in past 90 days: 0        Note composed:11:54 AM 07/07/2022

## 2022-07-25 ENCOUNTER — PATIENT OUTREACH (OUTPATIENT)
Dept: ADMINISTRATIVE | Facility: HOSPITAL | Age: 67
End: 2022-07-25
Payer: COMMERCIAL

## 2022-07-25 NOTE — PROGRESS NOTES
Working [facility] Med Adherence Report.    Med card indicates pt is on Ozempic.  Snap shot-med dispense history shows last filled for 28 day supply on 7/07/22.    Pt has appt for annual exam, 7/28/22.  LM for pt to come in fasting for same day labs.

## 2022-07-26 RX ORDER — SEMAGLUTIDE 1.34 MG/ML
0.25 INJECTION, SOLUTION SUBCUTANEOUS
COMMUNITY
Start: 2022-07-07 | End: 2022-07-28 | Stop reason: SDUPTHER

## 2022-07-28 ENCOUNTER — OFFICE VISIT (OUTPATIENT)
Dept: FAMILY MEDICINE | Facility: CLINIC | Age: 67
End: 2022-07-28
Payer: COMMERCIAL

## 2022-07-28 ENCOUNTER — LAB VISIT (OUTPATIENT)
Dept: LAB | Facility: HOSPITAL | Age: 67
End: 2022-07-28
Attending: FAMILY MEDICINE
Payer: COMMERCIAL

## 2022-07-28 VITALS
HEART RATE: 78 BPM | WEIGHT: 237.69 LBS | SYSTOLIC BLOOD PRESSURE: 134 MMHG | BODY MASS INDEX: 32.19 KG/M2 | DIASTOLIC BLOOD PRESSURE: 88 MMHG | RESPIRATION RATE: 17 BRPM | HEIGHT: 72 IN | TEMPERATURE: 97 F | OXYGEN SATURATION: 97 %

## 2022-07-28 DIAGNOSIS — Z13.6 ENCOUNTER FOR LIPID SCREENING FOR CARDIOVASCULAR DISEASE: ICD-10-CM

## 2022-07-28 DIAGNOSIS — Z79.899 ENCOUNTER FOR LONG-TERM (CURRENT) USE OF MEDICATIONS: ICD-10-CM

## 2022-07-28 DIAGNOSIS — Z00.01 ENCOUNTER FOR GENERAL ADULT MEDICAL EXAMINATION WITH ABNORMAL FINDINGS: ICD-10-CM

## 2022-07-28 DIAGNOSIS — Z13.220 ENCOUNTER FOR LIPID SCREENING FOR CARDIOVASCULAR DISEASE: ICD-10-CM

## 2022-07-28 DIAGNOSIS — Z00.00 WELL ADULT EXAM: ICD-10-CM

## 2022-07-28 DIAGNOSIS — C61 PROSTATE CANCER: ICD-10-CM

## 2022-07-28 DIAGNOSIS — Z00.00 WELL ADULT EXAM: Primary | ICD-10-CM

## 2022-07-28 DIAGNOSIS — E88.819 INSULIN RESISTANCE: ICD-10-CM

## 2022-07-28 DIAGNOSIS — A49.9 BACTERIAL INFECTION: ICD-10-CM

## 2022-07-28 DIAGNOSIS — Z23 NEED FOR PNEUMOCOCCAL VACCINE: ICD-10-CM

## 2022-07-28 LAB
ALBUMIN SERPL BCP-MCNC: 4.6 G/DL (ref 3.5–5.2)
ALP SERPL-CCNC: 74 U/L (ref 55–135)
ALT SERPL W/O P-5'-P-CCNC: 25 U/L (ref 10–44)
ANION GAP SERPL CALC-SCNC: 9 MMOL/L (ref 8–16)
AST SERPL-CCNC: 19 U/L (ref 10–40)
BASOPHILS # BLD AUTO: 0.06 K/UL (ref 0–0.2)
BASOPHILS NFR BLD: 1.1 % (ref 0–1.9)
BILIRUB SERPL-MCNC: 0.8 MG/DL (ref 0.1–1)
BUN SERPL-MCNC: 16 MG/DL (ref 8–23)
CALCIUM SERPL-MCNC: 9.7 MG/DL (ref 8.7–10.5)
CHLORIDE SERPL-SCNC: 106 MMOL/L (ref 95–110)
CHOLEST SERPL-MCNC: 232 MG/DL (ref 120–199)
CHOLEST/HDLC SERPL: 5.4 {RATIO} (ref 2–5)
CO2 SERPL-SCNC: 27 MMOL/L (ref 23–29)
COMPLEXED PSA SERPL-MCNC: 0.02 NG/ML (ref 0–4)
CREAT SERPL-MCNC: 1 MG/DL (ref 0.5–1.4)
DIFFERENTIAL METHOD: ABNORMAL
EOSINOPHIL # BLD AUTO: 0.3 K/UL (ref 0–0.5)
EOSINOPHIL NFR BLD: 5.8 % (ref 0–8)
ERYTHROCYTE [DISTWIDTH] IN BLOOD BY AUTOMATED COUNT: 13.5 % (ref 11.5–14.5)
EST. GFR  (AFRICAN AMERICAN): >60 ML/MIN/1.73 M^2
EST. GFR  (NON AFRICAN AMERICAN): >60 ML/MIN/1.73 M^2
ESTIMATED AVG GLUCOSE: 111 MG/DL (ref 68–131)
GLUCOSE SERPL-MCNC: 96 MG/DL (ref 70–110)
HBA1C MFR BLD: 5.5 % (ref 4–5.6)
HCT VFR BLD AUTO: 46.5 % (ref 40–54)
HDLC SERPL-MCNC: 43 MG/DL (ref 40–75)
HDLC SERPL: 18.5 % (ref 20–50)
HGB BLD-MCNC: 15.1 G/DL (ref 14–18)
IMM GRANULOCYTES # BLD AUTO: 0.02 K/UL (ref 0–0.04)
IMM GRANULOCYTES NFR BLD AUTO: 0.4 % (ref 0–0.5)
LDLC SERPL CALC-MCNC: 161.8 MG/DL (ref 63–159)
LYMPHOCYTES # BLD AUTO: 1.7 K/UL (ref 1–4.8)
LYMPHOCYTES NFR BLD: 29.6 % (ref 18–48)
MCH RBC QN AUTO: 31.1 PG (ref 27–31)
MCHC RBC AUTO-ENTMCNC: 32.5 G/DL (ref 32–36)
MCV RBC AUTO: 96 FL (ref 82–98)
MONOCYTES # BLD AUTO: 0.6 K/UL (ref 0.3–1)
MONOCYTES NFR BLD: 10.3 % (ref 4–15)
NEUTROPHILS # BLD AUTO: 3 K/UL (ref 1.8–7.7)
NEUTROPHILS NFR BLD: 52.8 % (ref 38–73)
NONHDLC SERPL-MCNC: 189 MG/DL
NRBC BLD-RTO: 0 /100 WBC
PLATELET # BLD AUTO: 238 K/UL (ref 150–450)
PMV BLD AUTO: 11.2 FL (ref 9.2–12.9)
POTASSIUM SERPL-SCNC: 4.7 MMOL/L (ref 3.5–5.1)
PROT SERPL-MCNC: 7.3 G/DL (ref 6–8.4)
RBC # BLD AUTO: 4.85 M/UL (ref 4.6–6.2)
SODIUM SERPL-SCNC: 142 MMOL/L (ref 136–145)
TRIGL SERPL-MCNC: 136 MG/DL (ref 30–150)
WBC # BLD AUTO: 5.71 K/UL (ref 3.9–12.7)

## 2022-07-28 PROCEDURE — 1126F PR PAIN SEVERITY QUANTIFIED, NO PAIN PRESENT: ICD-10-PCS | Mod: CPTII,S$GLB,, | Performed by: FAMILY MEDICINE

## 2022-07-28 PROCEDURE — 85025 COMPLETE CBC W/AUTO DIFF WBC: CPT | Performed by: FAMILY MEDICINE

## 2022-07-28 PROCEDURE — 80053 COMPREHEN METABOLIC PANEL: CPT | Performed by: FAMILY MEDICINE

## 2022-07-28 PROCEDURE — 83036 HEMOGLOBIN GLYCOSYLATED A1C: CPT | Performed by: FAMILY MEDICINE

## 2022-07-28 PROCEDURE — 1101F PR PT FALLS ASSESS DOC 0-1 FALLS W/OUT INJ PAST YR: ICD-10-PCS | Mod: CPTII,S$GLB,, | Performed by: FAMILY MEDICINE

## 2022-07-28 PROCEDURE — 84153 ASSAY OF PSA TOTAL: CPT | Performed by: FAMILY MEDICINE

## 2022-07-28 PROCEDURE — 99397 PER PM REEVAL EST PAT 65+ YR: CPT | Mod: 25,S$GLB,, | Performed by: FAMILY MEDICINE

## 2022-07-28 PROCEDURE — 99999 PR PBB SHADOW E&M-EST. PATIENT-LVL V: CPT | Mod: PBBFAC,,, | Performed by: FAMILY MEDICINE

## 2022-07-28 PROCEDURE — 99999 PR PBB SHADOW E&M-EST. PATIENT-LVL V: ICD-10-PCS | Mod: PBBFAC,,, | Performed by: FAMILY MEDICINE

## 2022-07-28 PROCEDURE — 3288F PR FALLS RISK ASSESSMENT DOCUMENTED: ICD-10-PCS | Mod: CPTII,S$GLB,, | Performed by: FAMILY MEDICINE

## 2022-07-28 PROCEDURE — 3079F PR MOST RECENT DIASTOLIC BLOOD PRESSURE 80-89 MM HG: ICD-10-PCS | Mod: CPTII,S$GLB,, | Performed by: FAMILY MEDICINE

## 2022-07-28 PROCEDURE — 36415 COLL VENOUS BLD VENIPUNCTURE: CPT | Mod: PO | Performed by: FAMILY MEDICINE

## 2022-07-28 PROCEDURE — 90471 IMMUNIZATION ADMIN: CPT | Mod: S$GLB,,, | Performed by: FAMILY MEDICINE

## 2022-07-28 PROCEDURE — 3075F PR MOST RECENT SYSTOLIC BLOOD PRESS GE 130-139MM HG: ICD-10-PCS | Mod: CPTII,S$GLB,, | Performed by: FAMILY MEDICINE

## 2022-07-28 PROCEDURE — 3075F SYST BP GE 130 - 139MM HG: CPT | Mod: CPTII,S$GLB,, | Performed by: FAMILY MEDICINE

## 2022-07-28 PROCEDURE — 90677 PNEUMOCOCCAL CONJUGATE VACCINE 20-VALENT: ICD-10-PCS | Mod: S$GLB,,, | Performed by: FAMILY MEDICINE

## 2022-07-28 PROCEDURE — 1159F PR MEDICATION LIST DOCUMENTED IN MEDICAL RECORD: ICD-10-PCS | Mod: CPTII,S$GLB,, | Performed by: FAMILY MEDICINE

## 2022-07-28 PROCEDURE — 1159F MED LIST DOCD IN RCRD: CPT | Mod: CPTII,S$GLB,, | Performed by: FAMILY MEDICINE

## 2022-07-28 PROCEDURE — 80061 LIPID PANEL: CPT | Performed by: FAMILY MEDICINE

## 2022-07-28 PROCEDURE — 1101F PT FALLS ASSESS-DOCD LE1/YR: CPT | Mod: CPTII,S$GLB,, | Performed by: FAMILY MEDICINE

## 2022-07-28 PROCEDURE — 3008F BODY MASS INDEX DOCD: CPT | Mod: CPTII,S$GLB,, | Performed by: FAMILY MEDICINE

## 2022-07-28 PROCEDURE — 3079F DIAST BP 80-89 MM HG: CPT | Mod: CPTII,S$GLB,, | Performed by: FAMILY MEDICINE

## 2022-07-28 PROCEDURE — 1126F AMNT PAIN NOTED NONE PRSNT: CPT | Mod: CPTII,S$GLB,, | Performed by: FAMILY MEDICINE

## 2022-07-28 PROCEDURE — 3288F FALL RISK ASSESSMENT DOCD: CPT | Mod: CPTII,S$GLB,, | Performed by: FAMILY MEDICINE

## 2022-07-28 PROCEDURE — 90677 PCV20 VACCINE IM: CPT | Mod: S$GLB,,, | Performed by: FAMILY MEDICINE

## 2022-07-28 PROCEDURE — 3008F PR BODY MASS INDEX (BMI) DOCUMENTED: ICD-10-PCS | Mod: CPTII,S$GLB,, | Performed by: FAMILY MEDICINE

## 2022-07-28 PROCEDURE — 99397 PR PREVENTIVE VISIT,EST,65 & OVER: ICD-10-PCS | Mod: 25,S$GLB,, | Performed by: FAMILY MEDICINE

## 2022-07-28 PROCEDURE — 90471 PNEUMOCOCCAL CONJUGATE VACCINE 20-VALENT: ICD-10-PCS | Mod: S$GLB,,, | Performed by: FAMILY MEDICINE

## 2022-07-28 RX ORDER — FAMOTIDINE 20 MG/1
TABLET, FILM COATED ORAL
COMMUNITY
Start: 1998-04-01 | End: 2022-09-14 | Stop reason: SDUPTHER

## 2022-07-28 RX ORDER — METHYLPREDNISOLONE 4 MG/1
TABLET ORAL
Qty: 21 TABLET | Refills: 0 | Status: SHIPPED | OUTPATIENT
Start: 2022-07-28 | End: 2022-11-01

## 2022-07-28 RX ORDER — AZITHROMYCIN 250 MG/1
TABLET, FILM COATED ORAL
Qty: 6 TABLET | Refills: 0 | Status: SHIPPED | OUTPATIENT
Start: 2022-07-28 | End: 2022-11-21

## 2022-07-28 RX ORDER — SEMAGLUTIDE 1.34 MG/ML
0.5 INJECTION, SOLUTION SUBCUTANEOUS
Qty: 3 PEN | Refills: 4 | Status: SHIPPED | OUTPATIENT
Start: 2022-07-28 | End: 2023-09-07

## 2022-07-28 NOTE — PROGRESS NOTES
This note is specifically for wellness visit performed today.     WELLNESS EXAM      Patient ID: Blayne Mendoza is a 66 y.o. male.  has a past medical history of Allergy, Basal cell carcinoma, Cancer, Colon polyp, Erosive gastritis (6/25/2020), and Insulin resistance (7/28/2021).     Chief Complaint:  Encounter for wellness exam    Well Adult Physical: Patient here for a comprehensive physical exam.The patient reports chronic problems.  The patient's last visit with me was on 7/28/2021.    Reviewed Care team:Previous PCP: Dr. Christopher Méndez    Urology Dr. Jorgensen- prostate cancer removal       Latest Reference Range & Units 01/28/10 08:12 08/02/10 08:43 12/13/11 11:42 01/03/13 16:10 12/09/14 09:50 12/15/15 15:05 08/20/18 08:48 02/01/19 08:48 01/28/21 08:47 07/28/21 08:46   PSA, Screen 0.00 - 4.00 ng/mL 0.01 0.03 0.04 0.02 0.02 0.04    0.02   PSA Diagnostic 0.00 - 4.00 ng/mL       0.03  0.02    PSA Total 0.00 - 4.00 ng/mL        0.01 0.01    PSA, Free 0.01 - 1.50 ng/mL        0.01 0.01    PSA, Free % Not established %        100.00 100.00        July 2022:  It is been one year since our last visit.  Patient reports that he is feeling well.  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    Currently having the need for prophylactic treatment for bacterial infection.  Requesting refills.  He reports that he has been doing well on Ozempic.  He has not been taking the medication regularly however.  Weight has been stable.    Diabetes Management Status    Statin: Not taking  ACE/ARB: Not taking    Screening or Prevention Patient's value Goal Complete/Controlled?   HgA1C Testing and Control   Lab Results   Component Value Date    HGBA1C 5.6 07/28/2021      Annually/Less than 8% Yes   Lipid profile : 07/28/2021 Annually No   LDL control Lab Results   Component Value Date    LDLCALC 157.6 07/28/2021    Annually/Less than 100 mg/dl  No   Nephropathy screening No results found  for: LABMICR  Lab Results   Component Value Date    PROTEINUA 3+ (A) 06/27/2018     No results found for: UTPCR   Annually No   Blood pressure BP Readings from Last 1 Encounters:   07/28/22 134/88    Less than 140/90 Yes   Dilated retinal exam Most Recent Eye Exam Date: Not Found Annually No   Foot exam   Most Recent Foot Exam Date: Not Found Annually No         BMI Readings from Last 10 Encounters:   07/28/22 32.24 kg/m²   07/28/21 32.33 kg/m²   07/26/21 32.25 kg/m²   10/08/20 31.19 kg/m²   07/28/20 30.62 kg/m²   06/25/20 29.90 kg/m²   10/01/19 29.97 kg/m²   08/12/19 29.60 kg/m²   07/22/19 30.38 kg/m²   03/14/19 30.38 kg/m²         Hyperlipidemia:  Patient was recently started on statin but never took the medication by Dr. WYATT.  Patient reports that he would like to try diet and exercise to reduce his cholesterol levels.  CHRONIC.  Uncontrolled LDL. Lab analysis reviewed.   (-) CP, SOB, abdominal pain, N/V/D, constipation, jaundice, skin changes.  (-) Myalgias  Lab Results   Component Value Date    CHOL 227 (H) 07/28/2021    CHOL 221 (H) 01/28/2021    CHOL 209 (H) 06/23/2020     Lab Results   Component Value Date    HDL 47 07/28/2021    HDL 43 01/28/2021    HDL 44 06/23/2020     Lab Results   Component Value Date    LDLCALC 157.6 07/28/2021    LDLCALC 151.8 01/28/2021    LDLCALC 138.2 06/23/2020     Lab Results   Component Value Date    TRIG 112 07/28/2021    TRIG 131 01/28/2021    TRIG 134 06/23/2020     Lab Results   Component Value Date    CHOLHDL 20.7 07/28/2021    CHOLHDL 19.5 (L) 01/28/2021    CHOLHDL 21.1 06/23/2020     Lab Results   Component Value Date    TOTALCHOLEST 4.8 07/28/2021    TOTALCHOLEST 5.1 (H) 01/28/2021    TOTALCHOLEST 4.8 06/23/2020     Lab Results   Component Value Date    ALT 24 07/28/2021    AST 17 07/28/2021    ALKPHOS 78 07/28/2021    BILITOT 0.6 07/28/2021     ======================================================  The 10-year ASCVD risk score (Nannette BORRERO Jr., et al., 2013) is:  17.6%    Values used to calculate the score:      Age: 66 years      Sex: Male      Is Non- : No      Diabetic: No      Tobacco smoker: No      Systolic Blood Pressure: 134 mmHg      Is BP treated: No      HDL Cholesterol: 47 mg/dL      Total Cholesterol: 227 mg/dL      Do you take any herbs or supplements that were not prescribed by a doctor? no   Are you taking calcium supplements? no      History:  History of prostate cancer detected by physical by Dr. Méndez and removed several years ago.  PSA has been very low.   UROLOGIST: Kal Sprague MD  Date last PSA:  See diagnostic PSA above.  Lab Results   Component Value Date    PSA 0.02 07/28/2021    PSA 0.04 12/15/2015    PSA 0.02 12/09/2014      Colon cancer screening:Findings:        The perianal and digital rectal examinations were normal.        A 1 mm polyp was found in the descending colon. The polyp was        sessile. The polyp was removed with a cold biopsy forceps. Resection        and retrieval were complete. Estimated blood loss was minimal.        A diffuse area of moderately erythematous mucosa was found in the        rectum and in the sigmoid colon. Biopsies were taken with a cold        forceps for histology. Estimated blood loss was minimal. this is        suspicious for ischemic colitis.        A few small-mouthed diverticula were found in the sigmoid colon.        The anus, descending colon, transverse colon, ascending colon,        cecum, appendiceal orifice, ileocecal valve and ileum appeared        normal.        A 1 mm polyp was found in the descending colon. The polyp was        sessile. The polyp was removed with a cold biopsy forceps. Resection        and retrieval were complete. Estimated blood loss was minimal.   Impression:           - One 1 mm polyp in the descending colon, removed                         with a cold biopsy forceps. Resected and retrieved.                         - Erythematous mucosa in the  rectum and in the                         sigmoid colon. Biopsied.                         - Diverticulosis in the sigmoid colon.                         - The anus, descending colon, transverse colon,                         ascending colon, cecum, appendiceal orifice,                         ileocecal valve and terminal ileum are normal.                         - One 1 mm polyp in the descending colon, removed                         with a cold biopsy forceps. Resected and retrieved.   Recommendation:       - Patient has a contact number available for                         emergencies. The signs and symptoms of potential                         delayed complications were discussed with the                         patient. Return to normal activities tomorrow.                         Written discharge instructions were provided to the                         patient.                         - Resume previous diet.                         - Continue present medications.                         - No aspirin, ibuprofen, naproxen, or other                         non-steroidal anti-inflammatory drugs.                         - Return to my office in 2 weeks.                         - Repeat colonoscopy in 5 years for surveillance.                         - Discharge patient to home (via wheelchair).   Rod Rayo MD   6/25/2020 1:59:57 PM     Health Maintenance Topics with due status: Not Due       Topic Last Completion Date    TETANUS VACCINE 11/25/2015    Colorectal Cancer Screening 06/25/2020    Influenza Vaccine 10/21/2021        ==============================================  History reviewed.     Health Maintenance Due   Topic Date Due    Abdominal Aortic Aneurysm Screening  Never done    COVID-19 Vaccine (4 - Booster for Moderna series) 02/21/2022    Shingles Vaccine (2 of 2) 06/16/2022    Lipid Panel  07/28/2022     Patient defers vaccines at this time defers AAA screening  Past Medical  History:  Past Medical History:   Diagnosis Date    Allergy     Basal cell carcinoma     Cancer     Prostate    Colon polyp     Erosive gastritis 6/25/2020    Insulin resistance 7/28/2021     Past Surgical History:   Procedure Laterality Date    ADENOIDECTOMY  1966    Tonsils and adenoids removed    COLONOSCOPY N/A 12/1/2015    Procedure: COLONOSCOPY;  Surgeon: Rod Rayo MD;  Location: [facility];  Service: Endoscopy;  Laterality: N/A;    COLONOSCOPY N/A 6/25/2020    Procedure: COLONOSCOPY;  Surgeon: Rod Rayo MD;  Location: [facility];  Service: Endoscopy;  Laterality: N/A;    ESOPHAGOGASTRODUODENOSCOPY N/A 6/25/2020    Procedure: ESOPHAGOGASTRODUODENOSCOPY (EGD);  Surgeon: Rod Rayo MD;  Location: [facility];  Service: Endoscopy;  Laterality: N/A;    PROSTATE SURGERY  2008    radical prostatectomy    TONSILLECTOMY      Child    VASECTOMY       Review of patient's allergies indicates:   Allergen Reactions    Penicillins      Other reaction(s): Swelling    Sulfa (sulfonamide antibiotics)      Other reaction(s): Rash    Bactrim  [sulfamethoxazole-trimethoprim] Rash     Current Outpatient Medications on File Prior to Visit   Medication Sig Dispense Refill    famotidine (PEPCID) 20 MG tablet       hydrOXYzine HCL (ATARAX) 25 MG tablet Take 1/2 to 1 tablet for itching as needed. **MAY CAUSE DROWSINESS** 60 tablet 11    ketoconazole (NIZORAL) 2 % shampoo       [DISCONTINUED] OZEMPIC 0.25 mg or 0.5 mg(2 mg/1.5 mL) pen injector Inject 0.25 mg into the skin every 7 days.      diclofenac (VOLTAREN) 75 MG EC tablet Take 1 tablet (75 mg total) by mouth 2 (two) times daily. (Patient not taking: Reported on 7/28/2022) 60 tablet 12    doxycycline (MONODOX) 100 MG capsule TAKE ONE CAPSULE BY MOUTH ONCE DAILY (Patient not taking: Reported on 7/28/2022) 30 capsule 3    fluocinolone (SYNALAR) 0.01 % Sham Apply topically once daily at 6am. (Patient not taking: Reported on 7/28/2022) 120 mL 3     montelukast (SINGULAIR) 10 mg tablet TAKE 1 TABLET BY MOUTH EVERY EVENING (Patient not taking: Reported on 2022) 90 tablet 4     No current facility-administered medications on file prior to visit.     Social History     Socioeconomic History    Marital status:    Tobacco Use    Smoking status: Former Smoker     Packs/day: 0.25     Years: 5.00     Pack years: 1.25     Types: Cigarettes, Cigars     Start date: 1977     Quit date: 1982     Years since quittin.6    Smokeless tobacco: Never Used    Tobacco comment: Still crave one at times.  Makes me sick for days when I have one   Substance and Sexual Activity    Alcohol use: Yes     Alcohol/week: 6.0 standard drinks     Types: 6 Drinks containing 0.5 oz of alcohol per week     Comment: one a day    Drug use: No    Sexual activity: Not Currently     Partners: Female     Comment: My prostate removal has cause nerve damage-ED-must fix!     Social Determinants of Health     Financial Resource Strain: Low Risk     Difficulty of Paying Living Expenses: Not hard at all   Food Insecurity: No Food Insecurity    Worried About Running Out of Food in the Last Year: Never true    Ran Out of Food in the Last Year: Never true   Transportation Needs: No Transportation Needs    Lack of Transportation (Medical): No    Lack of Transportation (Non-Medical): No   Physical Activity: Sufficiently Active    Days of Exercise per Week: 3 days    Minutes of Exercise per Session: 150+ min   Stress: No Stress Concern Present    Feeling of Stress : Only a little   Social Connections: Unknown    Frequency of Communication with Friends and Family: More than three times a week    Frequency of Social Gatherings with Friends and Family: More than three times a week    Active Member of Clubs or Organizations: Yes    Attends Club or Organization Meetings: More than 4 times per year    Marital Status:    Housing Stability: Low Risk     Unable to  Pay for Housing in the Last Year: No    Number of Places Lived in the Last Year: 1    Unstable Housing in the Last Year: No     Family History   Problem Relation Age of Onset    Cancer Mother         Ovarian Cancer-       Vitals:    22 0748   BP: 134/88   Pulse: 78   Resp: 17   Temp: 97.4 °F (36.3 °C)      Body mass index is 32.24 kg/m².     Review of Systems   Constitutional: Negative for activity change and unexpected weight change.   HENT: Positive for hearing loss. Negative for rhinorrhea and trouble swallowing.    Eyes: Negative for discharge and visual disturbance.   Respiratory: Negative for chest tightness and wheezing.    Cardiovascular: Negative for chest pain and palpitations.   Gastrointestinal: Negative for blood in stool, constipation, diarrhea and vomiting.   Endocrine: Negative for polydipsia and polyuria.   Genitourinary: Negative for difficulty urinating, hematuria and urgency.   Musculoskeletal: Positive for arthralgias. Negative for joint swelling and neck pain.   Neurological: Negative for weakness and headaches.   Psychiatric/Behavioral: Negative for confusion and dysphoric mood.          Objective:      Physical Exam  Vitals and nursing note reviewed.   Constitutional:       General: He is not in acute distress.     Appearance: He is well-developed.   HENT:      Head: Normocephalic and atraumatic.   Eyes:      Pupils: Pupils are equal, round, and reactive to light.   Cardiovascular:      Rate and Rhythm: Normal rate and regular rhythm.      Heart sounds: Normal heart sounds. No murmur heard.  Pulmonary:      Effort: Pulmonary effort is normal. No respiratory distress.      Breath sounds: Normal breath sounds. No wheezing.   Abdominal:      General: Bowel sounds are normal.      Palpations: Abdomen is soft.   Musculoskeletal:         General: Normal range of motion.      Cervical back: Normal range of motion and neck supple.   Skin:     General: Skin is warm and dry.      Capillary  Refill: Capillary refill takes less than 2 seconds.   Neurological:      Mental Status: He is alert and oriented to person, place, and time.      Cranial Nerves: No cranial nerve deficit.   Psychiatric:         Behavior: Behavior normal.          Assessment / Plan:      1.  Routine health exam-patient here for annual wellness exam.  Labs ordered.  Health maintenance was reviewed and ordered.    Hyperlipidemia:  Patient will continue lifestyle modification and increasing Ozempic dosage for weight loss.  Previously  Trial of diet and exercise.  Patient does have elevated ASCVD score however he does not want to take statin medication.  Will continue to monitor and recheck lipid panel in six months.Counseled on hyperlipidemia disease course, healthy diet and increased need for exercise.  Please be advised of the risk of cardiovascular disease, increase stroke and heart attack risk with uncontrolled/untreated hyperlipidemia.     Patient voiced understanding and understood the treatment plan. All questions were answered.     History of prostate cancer:  Check diagnostic PSA yearly.  Insomnia/itching:  Continue hydroxyzine 25 milligrams at bedtime.Discussed insomnia condition course.  Advised of first-line medications for this condition.  Also discussed sleep hygiene.  Information was given below.  Good sleep habits (sometimes referred to as sleep hygiene) can help you get a good nights sleep.    Some habits that can improve your sleep health:  -Be consistent. Go to bed at the same time each night and get up at the same time each morning, including on the weekends  -Make sure your bedroom is quiet, dark, relaxing, and at a comfortable temperature  -Remove electronic devices, such as TVs, computers, and smart phones, from the bedroom  -Avoid large meals, caffeine, and alcohol before bedtime  -Get some exercise. Being physically active during the day can help you fall asleep more easily at night.    Weight gain/insulin  resistance:  Continue Ozempic.  We will plan to monitor hemoglobin A1c at designated intervals 3 to 6 months.  I recommend ongoing Education for diabetic diet and exercise protocol.  We will continue to monitor for side effects.    Please be advised of symptoms to monitor for and to notify me immediately if persistent or worsening.  Follow up with Ophthalmology/Optometry and Podiatry at least annually.    Denies history of pancreatitis, thyroid cancer, MEN syndrome.  A1c was within normal limits however consider GLP1as potential treatment for his obesity.  Patient is aware that this may be expensive and he is willing to go over the cost if not covered by insurance.    Hearing loss:  Follow-up with audiology.    Complete history and physical was completed today.  Complete and thorough medication reconciliation was performed.  Discussed risks and benefits of medications.  Advised patient on orders and health maintenance.  We discussed old records and old labs if available.  Will request any records not available through epic.  Continue current medications listed on your summary sheet.    All questions were answered. Patient had no further concerns. Advised of diagnoses and plan. Follow up as planned or return sooner if symptoms persist or worsen.     Orders Placed This Encounter   Procedures    (In Office Administered) Pneumococcal Conjugate Vaccine (20 Valent) (IM)    CBC Auto Differential     Standing Status:   Future     Number of Occurrences:   1     Standing Expiration Date:   9/26/2023       Medications Ordered This Encounter   Medications    azithromycin (Z-NICOLASA) 250 MG tablet     Sig: Take as directed.     Dispense:  6 tablet     Refill:  0    methylPREDNISolone (MEDROL DOSEPACK) 4 mg tablet     Sig: follow package directions     Dispense:  21 tablet     Refill:  0    OZEMPIC 0.25 mg or 0.5 mg(2 mg/1.5 mL) pen injector     Sig: Inject 0.5 mg into the skin every 7 days.     Dispense:  3 pen     Refill:  4       Future Appointments     Date Provider Specialty Appt Notes    7/28/2023 Dustin Reyes MD Family Medicine Annual          Dustin Reyes MD

## 2022-07-28 NOTE — PATIENT INSTRUCTIONS
Follow up in about 1 year (around 7/28/2023), or if symptoms worsen or fail to improve, for annual.     Dear patient,   As a result of recent federal legislation (The Federal Cures Act), you may receive lab or pathology results from your visit in your [facility] account before your physician is able to contact you. Your physician or their representative will relay the results to you with their recommendations at their soonest availability.     If no improvement in symptoms or symptoms worsen, please be advised to call MD, follow-up at clinic and/or go to ER if becomes severe.    Dustin Reyes M.D.        We Offer TELEHEALTH & Same Day Appointments!   Book your Telehealth appointment with me through my nurse or   Clinic appointments on [employer]!    [address] [place] [address]    Office: [phone]   FAX: [phone]    Check out my Facebook Page and Follow Me at: [email or web address]    Check out my website at [employer] by clicking on: [email or web address]    To Schedule appointments online, go to [employer]: [email or web address]

## 2022-07-29 ENCOUNTER — PATIENT MESSAGE (OUTPATIENT)
Dept: FAMILY MEDICINE | Facility: CLINIC | Age: 67
End: 2022-07-29
Payer: COMMERCIAL

## 2022-07-29 NOTE — PROGRESS NOTES
Make follow-up lab appointment per recommendation below.  Check to see if patient has seen the results through my chart.  If not then,  #CALL THE PATIENT# to discuss results/see if they have questions and document verification of contact. Make F/U appt if needed. [phone]    #My interpretation that was sent to them through [employer]:  Isaac, I have reviewed your recent blood work.     PSA diagnostic test remain stable.  Repeat this test in one year.  Your complete blood count is normal.    Your metabolic panel which shows your glucose, kidney function, electrolytes, and liver function is normal.   Your cholesterol is elevated from previous.  Your 10 year risk score remains elevated (See below), which indicates higher risk of heart attacks and strokes.  I would like to see this number lower by lowering your cholesterol.  As we discussed I think with increasing the Ozempic dosage will help you with weight loss and will in turn improve these numbers.  You can also try supplements as we discussed niacin, fish oil, fiber, Co Q10 enzyme over-the-counter.  You can also try a high-fiber diet and increase in aerobic exercise.  Let us plan to recheck labs in six months to assess.  Message me if you have any questions or concerns about this.  Your hemoglobin A1c is stable.  This test is gold standard screening test for diabetes.  It is a measures 3 months of your average blood sugar.    =========================  Also please address any outstanding health maintenance that may be due: Abdominal Aortic Aneurysm Screening Never done  COVID-19 Vaccine(4 - Booster for Moderna series) due on 02/21/2022  Shingles Vaccine(2 of 2) due on 06/16/2022

## 2022-08-01 NOTE — TELEPHONE ENCOUNTER
Check her medication list.  I thought I sent it on the same day.  If not send me the Rx request and I will  resend for her.

## 2022-09-12 ENCOUNTER — CLINICAL SUPPORT (OUTPATIENT)
Dept: AUDIOLOGY | Facility: CLINIC | Age: 67
End: 2022-09-12
Payer: COMMERCIAL

## 2022-09-12 ENCOUNTER — OFFICE VISIT (OUTPATIENT)
Dept: OTOLARYNGOLOGY | Facility: CLINIC | Age: 67
End: 2022-09-12
Payer: COMMERCIAL

## 2022-09-12 VITALS — WEIGHT: 235.88 LBS | TEMPERATURE: 99 F | BODY MASS INDEX: 31.95 KG/M2 | HEIGHT: 72 IN

## 2022-09-12 DIAGNOSIS — H90.3 BILATERAL SENSORINEURAL HEARING LOSS: Primary | ICD-10-CM

## 2022-09-12 DIAGNOSIS — H90.3 SENSORINEURAL HEARING LOSS (SNHL) OF BOTH EARS: Primary | ICD-10-CM

## 2022-09-12 DIAGNOSIS — H93.293 IMPAIRED AUDITORY DISCRIMINATION, BILATERAL: ICD-10-CM

## 2022-09-12 PROCEDURE — 1125F PR PAIN SEVERITY QUANTIFIED, PAIN PRESENT: ICD-10-PCS | Mod: CPTII,S$GLB,, | Performed by: NURSE PRACTITIONER

## 2022-09-12 PROCEDURE — 1101F PR PT FALLS ASSESS DOC 0-1 FALLS W/OUT INJ PAST YR: ICD-10-PCS | Mod: CPTII,S$GLB,, | Performed by: NURSE PRACTITIONER

## 2022-09-12 PROCEDURE — 99203 PR OFFICE/OUTPT VISIT, NEW, LEVL III, 30-44 MIN: ICD-10-PCS | Mod: S$GLB,,, | Performed by: NURSE PRACTITIONER

## 2022-09-12 PROCEDURE — 99999 PR PBB SHADOW E&M-EST. PATIENT-LVL III: ICD-10-PCS | Mod: PBBFAC,,, | Performed by: NURSE PRACTITIONER

## 2022-09-12 PROCEDURE — 3288F FALL RISK ASSESSMENT DOCD: CPT | Mod: CPTII,S$GLB,, | Performed by: NURSE PRACTITIONER

## 2022-09-12 PROCEDURE — 1160F RVW MEDS BY RX/DR IN RCRD: CPT | Mod: CPTII,S$GLB,, | Performed by: NURSE PRACTITIONER

## 2022-09-12 PROCEDURE — 3008F PR BODY MASS INDEX (BMI) DOCUMENTED: ICD-10-PCS | Mod: CPTII,S$GLB,, | Performed by: NURSE PRACTITIONER

## 2022-09-12 PROCEDURE — 3044F HG A1C LEVEL LT 7.0%: CPT | Mod: CPTII,S$GLB,, | Performed by: NURSE PRACTITIONER

## 2022-09-12 PROCEDURE — 99999 PR PBB SHADOW E&M-EST. PATIENT-LVL III: CPT | Mod: PBBFAC,,, | Performed by: NURSE PRACTITIONER

## 2022-09-12 PROCEDURE — 1159F MED LIST DOCD IN RCRD: CPT | Mod: CPTII,S$GLB,, | Performed by: NURSE PRACTITIONER

## 2022-09-12 PROCEDURE — 92557 PR COMPREHENSIVE HEARING TEST: ICD-10-PCS | Mod: S$GLB,,, | Performed by: AUDIOLOGIST

## 2022-09-12 PROCEDURE — 3044F PR MOST RECENT HEMOGLOBIN A1C LEVEL <7.0%: ICD-10-PCS | Mod: CPTII,S$GLB,, | Performed by: NURSE PRACTITIONER

## 2022-09-12 PROCEDURE — 99999 PR PBB SHADOW E&M-EST. PATIENT-LVL I: ICD-10-PCS | Mod: PBBFAC,,,

## 2022-09-12 PROCEDURE — 99999 PR PBB SHADOW E&M-EST. PATIENT-LVL I: CPT | Mod: PBBFAC,,,

## 2022-09-12 PROCEDURE — 99203 OFFICE O/P NEW LOW 30 MIN: CPT | Mod: S$GLB,,, | Performed by: NURSE PRACTITIONER

## 2022-09-12 PROCEDURE — 92567 PR TYMPA2METRY: ICD-10-PCS | Mod: S$GLB,,, | Performed by: AUDIOLOGIST

## 2022-09-12 PROCEDURE — 92567 TYMPANOMETRY: CPT | Mod: S$GLB,,, | Performed by: AUDIOLOGIST

## 2022-09-12 PROCEDURE — 3288F PR FALLS RISK ASSESSMENT DOCUMENTED: ICD-10-PCS | Mod: CPTII,S$GLB,, | Performed by: NURSE PRACTITIONER

## 2022-09-12 PROCEDURE — 1101F PT FALLS ASSESS-DOCD LE1/YR: CPT | Mod: CPTII,S$GLB,, | Performed by: NURSE PRACTITIONER

## 2022-09-12 PROCEDURE — 92557 COMPREHENSIVE HEARING TEST: CPT | Mod: S$GLB,,, | Performed by: AUDIOLOGIST

## 2022-09-12 PROCEDURE — 1125F AMNT PAIN NOTED PAIN PRSNT: CPT | Mod: CPTII,S$GLB,, | Performed by: NURSE PRACTITIONER

## 2022-09-12 PROCEDURE — 1160F PR REVIEW ALL MEDS BY PRESCRIBER/CLIN PHARMACIST DOCUMENTED: ICD-10-PCS | Mod: CPTII,S$GLB,, | Performed by: NURSE PRACTITIONER

## 2022-09-12 PROCEDURE — 3008F BODY MASS INDEX DOCD: CPT | Mod: CPTII,S$GLB,, | Performed by: NURSE PRACTITIONER

## 2022-09-12 PROCEDURE — 1159F PR MEDICATION LIST DOCUMENTED IN MEDICAL RECORD: ICD-10-PCS | Mod: CPTII,S$GLB,, | Performed by: NURSE PRACTITIONER

## 2022-09-12 NOTE — PROGRESS NOTES
Subjective:       Patient ID: Blayne Mendoza is a 66 y.o. male.    Chief Complaint: No chief complaint on file.    HPI  Patient is new to ENT, referred by Dr. Reyes for hearing loss. He is a  X 43 years. He is an avid sportsman, firearms exposure. [place] war . He has worsening hearing and comprehension. Difficulty hearing is most social situations.     Review of Systems   Constitutional: Negative.    HENT:  Positive for hearing loss.    Eyes: Negative.    Respiratory: Negative.     Cardiovascular: Negative.    Gastrointestinal: Negative.    Musculoskeletal: Negative.    Integumentary:  Negative.   Neurological: Negative.    Hematological: Negative.    Psychiatric/Behavioral: Negative.         Objective:      Physical Exam  Vitals and nursing note reviewed.   Constitutional:       General: He is not in acute distress.     Appearance: He is well-developed. He is not ill-appearing.   HENT:      Head: Normocephalic and atraumatic.      Right Ear: Hearing, tympanic membrane, ear canal and external ear normal. No middle ear effusion. Tympanic membrane is not erythematous.      Left Ear: Hearing, tympanic membrane, ear canal and external ear normal.  No middle ear effusion. Tympanic membrane is not erythematous.      Nose: Nose normal.   Eyes:      General: Lids are normal. No scleral icterus.        Right eye: No discharge.         Left eye: No discharge.   Neck:      Trachea: Trachea normal. No tracheal deviation.   Cardiovascular:      Rate and Rhythm: Normal rate.   Pulmonary:      Effort: Pulmonary effort is normal. No respiratory distress.      Breath sounds: No stridor. No wheezing.   Musculoskeletal:         General: Normal range of motion.      Cervical back: Normal range of motion.   Skin:     General: Skin is warm and dry.   Neurological:      Mental Status: He is alert and oriented to person, place, and time.      Coordination: Coordination is intact.      Gait: Gait normal.    Psychiatric:         Attention and Perception: Attention normal.         Mood and Affect: Mood normal.         Speech: Speech normal.         Behavior: Behavior normal. Behavior is cooperative.         Assessment:       Problem List Items Addressed This Visit    None  Visit Diagnoses       Bilateral sensorineural hearing loss    -  Primary    Impaired auditory discrimination, bilateral                Plan:         PATIENT IS MEDICALLY CLEARED FOR HEARING AIDS. The patient's hearing loss is not due to temporarily, correctable physical condition. There are no contraindications to hearing aid candidacy. Patient's audiogram reveals the patient is a candidate for amplification. Audiogram is reviewed in detail with the patient. The audiologist's recommendation that the patient have amplification/hearing aids is discussed and questions answered. Patient has been given information by the audiologist on how to schedule a hearing aid consultation. Patient is encouraged to wear ear protection in loud noise and return annually for hearing test. Return to clinic as needed for further ENT concerns.

## 2022-09-14 DIAGNOSIS — E88.819 INSULIN RESISTANCE: ICD-10-CM

## 2022-09-14 RX ORDER — SEMAGLUTIDE 1.34 MG/ML
INJECTION, SOLUTION SUBCUTANEOUS
Refills: 3 | OUTPATIENT
Start: 2022-09-14

## 2022-09-14 NOTE — TELEPHONE ENCOUNTER
Pharmacy Call Documentation    Pharmacy Called:   [facility] Call Time: 11:07   Spoke with: Sandra 09/14/2022       Called to verify that the script that was sent on: 7/28/22  for 12   month supply was received by the pharmacy.     Script was received.  request is duplicate or refill too soon.    Request will be: refused     Additional actions required: no      Current Medication Requested:   Requested Prescriptions     Pending Prescriptions Disp Refills    OZEMPIC 0.25 mg or 0.5 mg(2 mg/1.5 mL) pen injector [Pharmacy Med Name: Ozempic 0.25 mg or 0.5 mg (2 mg/1.5 mL) subcutaneous pen injector]  3     Sig: Inject 0.25 mg into the skin once a week for 30 days, THEN 0.5 mg once a week.        Note composed:11:07 AM 09/14/2022      Refill Decision Note   Blayne Mendoza  is requesting a refill authorization.  Brief Assessment and Rationale for Refill:  Quick Discontinue     Medication Therapy Plan:       Medication Reconciliation Completed: Yes   Comments:     No Care Gaps recommended.     Note composed:11:10 AM 09/14/2022

## 2022-09-14 NOTE — TELEPHONE ENCOUNTER
No new care gaps identified.  [facility] Embedded Care Gaps. Reference number: [id number]. 9/14/2022   10:32:10 AM CDT

## 2022-10-19 ENCOUNTER — CLINICAL SUPPORT (OUTPATIENT)
Dept: AUDIOLOGY | Facility: CLINIC | Age: 67
End: 2022-10-19
Payer: COMMERCIAL

## 2022-10-19 PROCEDURE — 99499 UNLISTED E&M SERVICE: CPT | Mod: S$GLB,,, | Performed by: AUDIOLOGIST

## 2022-10-19 PROCEDURE — 99499 NO LOS: ICD-10-PCS | Mod: S$GLB,,, | Performed by: AUDIOLOGIST

## 2022-10-19 NOTE — PROGRESS NOTES
The patient was seen for a hearing aid consult.  The patient's audiogram was discussed in detail with the patient and his daughter.  We discussed various types of hearing aids and levels of technology.  The patient ordered a [employer] L90-RL  hearing aid in P7 with a size 2  wire for each ear.  The patient indicated he will check with his insurance company to determine whether he has a  hearing aid benefit.  The patient was informed that he would have to receive reimbursement directly from his insurance company.  The 30 day trial period and payment procedure were discussed.  The patient scheduled a hearing aid fitting.

## 2022-10-28 ENCOUNTER — PATIENT MESSAGE (OUTPATIENT)
Dept: AUDIOLOGY | Facility: CLINIC | Age: 67
End: 2022-10-28
Payer: COMMERCIAL

## 2022-11-01 ENCOUNTER — E-VISIT (OUTPATIENT)
Dept: FAMILY MEDICINE | Facility: CLINIC | Age: 67
End: 2022-11-01
Payer: COMMERCIAL

## 2022-11-01 ENCOUNTER — PATIENT MESSAGE (OUTPATIENT)
Dept: FAMILY MEDICINE | Facility: CLINIC | Age: 67
End: 2022-11-01

## 2022-11-01 DIAGNOSIS — A49.9 BACTERIAL INFECTION: Primary | ICD-10-CM

## 2022-11-01 PROCEDURE — 99421 PR E&M, ONLINE DIGIT, EST, < 7 DAYS, 5-10 MINS: ICD-10-PCS | Mod: S$GLB,,, | Performed by: FAMILY MEDICINE

## 2022-11-01 PROCEDURE — 99421 OL DIG E/M SVC 5-10 MIN: CPT | Mod: S$GLB,,, | Performed by: FAMILY MEDICINE

## 2022-11-01 RX ORDER — DOXYCYCLINE 100 MG/1
100 CAPSULE ORAL EVERY 12 HOURS
Qty: 20 CAPSULE | Refills: 0 | Status: SHIPPED | OUTPATIENT
Start: 2022-11-01 | End: 2022-11-21

## 2022-11-01 RX ORDER — PREDNISONE 20 MG/1
20 TABLET ORAL DAILY
Qty: 5 TABLET | Refills: 0 | Status: SHIPPED | OUTPATIENT
Start: 2022-11-01 | End: 2022-11-06

## 2022-11-01 NOTE — PATIENT INSTRUCTIONS
Follow up if symptoms worsen or fail to improve.     Dear patient,   As a result of recent federal legislation (The Federal Cures Act), you may receive lab or pathology results from your visit in your [facility] account before your physician is able to contact you. Your physician or their representative will relay the results to you with their recommendations at their soonest availability.     If no improvement in symptoms or symptoms worsen, please be advised to call MD, follow-up at clinic and/or go to ER if becomes severe.    Dustin Reyes M.D.        We Offer TELEHEALTH & Same Day Appointments!   Book your Telehealth appointment with me through my nurse or   Clinic appointments on [employer]!    [address] [place] [address]    Office: [phone]   FAX: [phone]    Check out my Facebook Page and Follow Me at: [email or web address]    Check out my website at [employer] by clicking on: [email or web address]    To Schedule appointments online, go to [email or web address]: [email or web address]

## 2022-11-01 NOTE — PROGRESS NOTES
Patient ID: Blayne Mendoza is a 67 y.o. male.    Chief Complaint: Cough    The patient initiated a request through [employer] on 11/1/2022 for evaluation and management with a chief complaint of Cough     I evaluated the questionnaire submission on 11/01/2022  .    [facility] Peq Evisit Upper Respitatory/Cough Questionnaire    11/1/2022 10:46 AM CDT - Filed by Patient   Do you agree to participate in an E-Visit? Yes   If you have any of the following symptoms, please present to your local ER or call 911:  I acknowledge   What is the main issue that you would like for your doctor to address today? Last Business Travel Trip of year.  Just finished Dose Pac and Z Pac and still have pallet/throat/ear swelling and congestion.  Productive cough.  Historically stmptons are gone at this stage if medicattion.   Are you able to take your vital signs? No   What symptoms do you currently have?  Cough;  Fatigue;  Headache;  Nasal Congestion;  Runny nose;  Sore throat   Have you had a fever? No   When did your symptoms first appear? 10/18/2022   In the last two weeks, have you been in close contact with someone who has COVID-19 or the Flu? Yes, Flu   In the last two weeks, have you worked or volunteered in a healthcare facility or as a ? Healthcare facilities include a hospital, medical or dental clinic, long-term care facility, or nursing home No   Do you live in a long-term care facility, nursing home, or homeless shelter? No   List what you have done or taken to help your symptoms. Finished dise pac today and on Z Pac Tab to take tomorrow.   How severe are your symptoms? Moderate   Have you taken an at home Covid test? No   Have you taken a Flu test? No   Have you been fully vaccinated for COVID? (2 Pfizer, 2 Moderna or 1 Reji & Reji vaccine injections) Yes   Have you received a booster? Yes   Have you recieved a Flu shot? Yes   When did you recieve your Flu shot? 9/15/2022   Do you have transportation to get  tested for COVID if it is indicated and ordered for you at an [facility] location? Yes   Provide any information you feel is important to your history not asked above I have a long history of allergies.  Seems to happen this time of the year. I have taken  the pneumonia vaccine   Please attach any relevant images or files           Active Problem List with Overview Notes    Diagnosis Date Noted    Insulin resistance 07/28/2021    Hearing loss 07/28/2021    AC joint arthropathy 07/30/2020    Rotator cuff syndrome, right 07/30/2020     X-ray shows degenerative change of AC joint and glenohumeral joint.  Patient with overuse of the right arm with extracurricular activities and at work.  Has not had any steroid injection.  Patient takes Tylenol or anti-inflammatory as needed for the pain.      Right knee pain 07/28/2020    Right shoulder pain 07/28/2020     X-ray Shoulder 2 or More Views Right  Narrative: EXAMINATION:  XR SHOULDER COMPLETE 2 OR MORE VIEWS RIGHT    CLINICAL HISTORY:  pain;Pain in right shoulder    TECHNIQUE:  Two or three views of the right shoulder were performed.    COMPARISON:  None    FINDINGS:  Advanced degenerative changes at the AC joint with marginal spurring including along the inferior aspect of the joint.  Mild degenerative changes at the glenohumeral articulation noted as well.  No acute fracture or dislocation.  Soft tissues appear normal.  Impression: As above    Electronically signed by: Matty Small MD  Date:    07/28/2020  Time:    11:20          Encounter for long-term (current) use of medications 07/28/2020     CHRONIC. Stable. Compliant with medications for managed conditions. See medication list. No SE reported.   Routine lab analysis is being monitored. Refills were addressed.  Lab Results   Component Value Date    WBC 5.73 02/01/2019    HGB 14.2 02/01/2019    HCT 44.4 02/01/2019    MCV 97 02/01/2019     02/01/2019       Chemistry        Component Value Date/Time      02/01/2019 0848    K 4.8 02/01/2019 0848     02/01/2019 0848    CO2 30 (H) 02/01/2019 0848    BUN 20 02/01/2019 0848    CREATININE 0.9 02/01/2019 0848     02/01/2019 0848        Component Value Date/Time    CALCIUM 10.2 02/01/2019 0848    ALKPHOS 81 02/01/2019 0848    AST 18 02/01/2019 0848    ALT 22 02/01/2019 0848    BILITOT 0.5 02/01/2019 0848    ESTGFRAFRICA >60.0 02/01/2019 0848    EGFRNONAA >60.0 02/01/2019 0848          Lab Results   Component Value Date    TSH 1.607 02/01/2019         Rectal bleeding 06/25/2020    Epigastric pain 06/25/2020    Erosive gastritis 06/25/2020    Colitis 06/25/2020    Erectile dysfunction following radical prostatectomy 08/20/2018    Prostate cancer 08/20/2018    Colon polyps 12/01/2015    Diverticulosis of large intestine without hemorrhage 12/01/2015    Choroidal nevus, 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   Latest known visit with results is:   Lab Visit on 07/28/2022   Component Date Value Ref Range Status    Sodium 07/28/2022 142  136 - 145 mmol/L Final    Potassium 07/28/2022 4.7  3.5 - 5.1 mmol/L Final    Chloride 07/28/2022 106  95 - 110 mmol/L Final    CO2 07/28/2022 27  23 - 29 mmol/L Final    Glucose 07/28/2022 96  70 - 110 mg/dL Final    BUN 07/28/2022 16  8 - 23 mg/dL Final    Creatinine 07/28/2022 1.0  0.5 - 1.4 mg/dL Final    Calcium 07/28/2022 9.7  8.7 - 10.5 mg/dL Final    Total Protein 07/28/2022 7.3  6.0 - 8.4 g/dL Final    Albumin 07/28/2022 4.6  3.5 - 5.2 g/dL Final    Total Bilirubin 07/28/2022 0.8  0.1 - 1.0 mg/dL Final    Comment: For infants and newborns, interpretation of results should be based  on gestational age, weight and in agreement with clinical  observations.    Premature Infant recommended reference ranges:  Up to 24 hours.............<8.0 mg/dL  Up to 48 hours............<12.0 mg/dL  3-5  days..................<15.0 mg/dL  6-29 days.................<15.0 mg/dL      Alkaline Phosphatase 07/28/2022 74  55 - 135 U/L Final    AST 07/28/2022 19  10 - 40 U/L Final    ALT 07/28/2022 25  10 - 44 U/L Final    Anion Gap 07/28/2022 9  8 - 16 mmol/L Final    eGFR if African American 07/28/2022 >60.0  >60 mL/min/1.73 m^2 Final    eGFR if non African American 07/28/2022 >60.0  >60 mL/min/1.73 m^2 Final    Comment: Calculation used to obtain the estimated glomerular filtration  rate (eGFR) is the CKD-EPI equation.       Hemoglobin A1C 07/28/2022 5.5  4.0 - 5.6 % Final    Comment: ADA Screening Guidelines:  5.7-6.4%  Consistent with prediabetes  >or=6.5%  Consistent with diabetes    High levels of fetal hemoglobin interfere with the HbA1C  assay. Heterozygous hemoglobin variants (HbS, HgC, etc)do  not significantly interfere with this assay.   However, presence of multiple variants may affect accuracy.      Estimated Avg Glucose 07/28/2022 111  68 - 131 mg/dL Final    Cholesterol 07/28/2022 232 (H)  120 - 199 mg/dL Final    Comment: The National Cholesterol Education Program (NCEP) has set the  following guidelines (reference ranges) for Cholesterol:  Optimal.....................<200 mg/dL  Borderline High.............200-239 mg/dL  High........................> or = 240 mg/dL      Triglycerides 07/28/2022 136  30 - 150 mg/dL Final    Comment: The National Cholesterol Education Program (NCEP) has set the  following guidelines (reference values) for triglycerides:  Normal......................<150 mg/dL  Borderline High.............150-199 mg/dL  High........................200-499 mg/dL      HDL 07/28/2022 43  40 - 75 mg/dL Final    Comment: The National Cholesterol Education Program (NCEP) has set the  following guidelines (reference values) for HDL Cholesterol:  Low...............<40 mg/dL  Optimal...........>60 mg/dL      LDL Cholesterol 07/28/2022 161.8 (H)  63.0 - 159.0 mg/dL Final    Comment: The National  Cholesterol Education Program (NCEP) has set the  following guidelines (reference values) for LDL Cholesterol:  Optimal.......................<130 mg/dL  Borderline High...............130-159 mg/dL  High..........................160-189 mg/dL  Very High.....................>190 mg/dL      HDL/Cholesterol Ratio 07/28/2022 18.5 (L)  20.0 - 50.0 % Final    Total Cholesterol/HDL Ratio 07/28/2022 5.4 (H)  2.0 - 5.0 Final    Non-HDL Cholesterol 07/28/2022 189  mg/dL Final    Comment: Risk category and Non-HDL cholesterol goals:  Coronary heart disease (CHD)or equivalent (10-year risk of CHD >20%):  Non-HDL cholesterol goal     <130 mg/dL  Two or more CHD risk factors and 10-year risk of CHD <= 20%:  Non-HDL cholesterol goal     <160 mg/dL  0 to 1 CHD risk factor:  Non-HDL cholesterol goal     <190 mg/dL      PSA Diagnostic 07/28/2022 0.02  0.00 - 4.00 ng/mL Final    Comment: The testing method is a chemiluminescent microparticle immunoassay   manufactured by [employer] and performed on the    or   [employer] system. Values obtained with different assay manufacturers   for   methods may be different and cannot be used interchangeably.  PSA Expected levels:  Hormonal Therapy: <0.05 ng/ml  Prostatectomy: <0.01 ng/ml  Radiation Therapy: <1.00 ng/ml      WBC 07/28/2022 5.71  3.90 - 12.70 K/uL Final    RBC 07/28/2022 4.85  4.60 - 6.20 M/uL Final    Hemoglobin 07/28/2022 15.1  14.0 - 18.0 g/dL Final    Hematocrit 07/28/2022 46.5  40.0 - 54.0 % Final    MCV 07/28/2022 96  82 - 98 fL Final    MCH 07/28/2022 31.1 (H)  27.0 - 31.0 pg Final    MCHC 07/28/2022 32.5  32.0 - 36.0 g/dL Final    RDW 07/28/2022 13.5  11.5 - 14.5 % Final    Platelets 07/28/2022 238  150 - 450 K/uL Final    MPV 07/28/2022 11.2  9.2 - 12.9 fL Final    Immature Granulocytes 07/28/2022 0.4  0.0 - 0.5 % Final    Gran # (ANC) 07/28/2022 3.0  1.8 - 7.7 K/uL Final    Immature Grans (Abs) 07/28/2022 0.02  0.00 - 0.04 K/uL Final    Comment: Mild  elevation in immature granulocytes is non specific and   can be seen in a variety of conditions including stress response,   acute inflammation, trauma and pregnancy. Correlation with other   laboratory and clinical findings is essential.      Lymph # 07/28/2022 1.7  1.0 - 4.8 K/uL Final    Mono # 07/28/2022 0.6  0.3 - 1.0 K/uL Final    Eos # 07/28/2022 0.3  0.0 - 0.5 K/uL Final    Baso # 07/28/2022 0.06  0.00 - 0.20 K/uL Final    nRBC 07/28/2022 0  0 /100 WBC Final    Gran % 07/28/2022 52.8  38.0 - 73.0 % Final    Lymph % 07/28/2022 29.6  18.0 - 48.0 % Final    Mono % 07/28/2022 10.3  4.0 - 15.0 % Final    Eosinophil % 07/28/2022 5.8  0.0 - 8.0 % Final    Basophil % 07/28/2022 1.1  0.0 - 1.9 % Final    Differential Method 07/28/2022 Automated   Final       Encounter Diagnosis   Name Primary?    Bacterial infection Yes        No orders of the defined types were placed in this encounter.     Medications Ordered This Encounter   Medications    doxycycline (VIBRAMYCIN) 100 MG Cap     Sig: Take 1 capsule (100 mg total) by mouth every 12 (twelve) hours.     Dispense:  20 capsule     Refill:  0    predniSONE (DELTASONE) 20 MG tablet     Sig: Take 1 tablet (20 mg total) by mouth once daily. for 5 days     Dispense:  5 tablet     Refill:  0        E-Visit Time Tracking:    Day 1 Time (in minutes): 7     Total Time (in minutes): 7

## 2022-11-16 ENCOUNTER — PATIENT MESSAGE (OUTPATIENT)
Dept: FAMILY MEDICINE | Facility: CLINIC | Age: 67
End: 2022-11-16
Payer: COMMERCIAL

## 2022-11-21 ENCOUNTER — OFFICE VISIT (OUTPATIENT)
Dept: FAMILY MEDICINE | Facility: CLINIC | Age: 67
End: 2022-11-21
Payer: COMMERCIAL

## 2022-11-21 VITALS
HEIGHT: 72 IN | OXYGEN SATURATION: 98 % | TEMPERATURE: 97 F | HEART RATE: 72 BPM | BODY MASS INDEX: 31.83 KG/M2 | DIASTOLIC BLOOD PRESSURE: 88 MMHG | WEIGHT: 235 LBS | SYSTOLIC BLOOD PRESSURE: 134 MMHG

## 2022-11-21 DIAGNOSIS — Z79.899 ENCOUNTER FOR LONG-TERM (CURRENT) USE OF MEDICATIONS: ICD-10-CM

## 2022-11-21 DIAGNOSIS — H69.93 DYSFUNCTION OF BOTH EUSTACHIAN TUBES: Primary | ICD-10-CM

## 2022-11-21 DIAGNOSIS — E88.819 INSULIN RESISTANCE: ICD-10-CM

## 2022-11-21 DIAGNOSIS — Z23 NEED FOR VACCINATION: ICD-10-CM

## 2022-11-21 PROCEDURE — 99213 PR OFFICE/OUTPT VISIT, EST, LEVL III, 20-29 MIN: ICD-10-PCS | Mod: 25,S$GLB,, | Performed by: FAMILY MEDICINE

## 2022-11-21 PROCEDURE — 3075F PR MOST RECENT SYSTOLIC BLOOD PRESS GE 130-139MM HG: ICD-10-PCS | Mod: CPTII,S$GLB,, | Performed by: FAMILY MEDICINE

## 2022-11-21 PROCEDURE — 1159F MED LIST DOCD IN RCRD: CPT | Mod: CPTII,S$GLB,, | Performed by: FAMILY MEDICINE

## 2022-11-21 PROCEDURE — 90471 ZOSTER RECOMBINANT VACCINE: ICD-10-PCS | Mod: S$GLB,,, | Performed by: FAMILY MEDICINE

## 2022-11-21 PROCEDURE — 3044F PR MOST RECENT HEMOGLOBIN A1C LEVEL <7.0%: ICD-10-PCS | Mod: CPTII,S$GLB,, | Performed by: FAMILY MEDICINE

## 2022-11-21 PROCEDURE — 90471 IMMUNIZATION ADMIN: CPT | Mod: S$GLB,,, | Performed by: FAMILY MEDICINE

## 2022-11-21 PROCEDURE — 99213 OFFICE O/P EST LOW 20 MIN: CPT | Mod: 25,S$GLB,, | Performed by: FAMILY MEDICINE

## 2022-11-21 PROCEDURE — 3079F DIAST BP 80-89 MM HG: CPT | Mod: CPTII,S$GLB,, | Performed by: FAMILY MEDICINE

## 2022-11-21 PROCEDURE — 1159F PR MEDICATION LIST DOCUMENTED IN MEDICAL RECORD: ICD-10-PCS | Mod: CPTII,S$GLB,, | Performed by: FAMILY MEDICINE

## 2022-11-21 PROCEDURE — 1126F AMNT PAIN NOTED NONE PRSNT: CPT | Mod: CPTII,S$GLB,, | Performed by: FAMILY MEDICINE

## 2022-11-21 PROCEDURE — 3044F HG A1C LEVEL LT 7.0%: CPT | Mod: CPTII,S$GLB,, | Performed by: FAMILY MEDICINE

## 2022-11-21 PROCEDURE — 3288F FALL RISK ASSESSMENT DOCD: CPT | Mod: CPTII,S$GLB,, | Performed by: FAMILY MEDICINE

## 2022-11-21 PROCEDURE — 3008F PR BODY MASS INDEX (BMI) DOCUMENTED: ICD-10-PCS | Mod: CPTII,S$GLB,, | Performed by: FAMILY MEDICINE

## 2022-11-21 PROCEDURE — 90750 ZOSTER RECOMBINANT VACCINE: ICD-10-PCS | Mod: S$GLB,,, | Performed by: FAMILY MEDICINE

## 2022-11-21 PROCEDURE — 3075F SYST BP GE 130 - 139MM HG: CPT | Mod: CPTII,S$GLB,, | Performed by: FAMILY MEDICINE

## 2022-11-21 PROCEDURE — 99999 PR PBB SHADOW E&M-EST. PATIENT-LVL IV: ICD-10-PCS | Mod: PBBFAC,,, | Performed by: FAMILY MEDICINE

## 2022-11-21 PROCEDURE — 1101F PT FALLS ASSESS-DOCD LE1/YR: CPT | Mod: CPTII,S$GLB,, | Performed by: FAMILY MEDICINE

## 2022-11-21 PROCEDURE — 90750 HZV VACC RECOMBINANT IM: CPT | Mod: S$GLB,,, | Performed by: FAMILY MEDICINE

## 2022-11-21 PROCEDURE — 3008F BODY MASS INDEX DOCD: CPT | Mod: CPTII,S$GLB,, | Performed by: FAMILY MEDICINE

## 2022-11-21 PROCEDURE — 99999 PR PBB SHADOW E&M-EST. PATIENT-LVL IV: CPT | Mod: PBBFAC,,, | Performed by: FAMILY MEDICINE

## 2022-11-21 PROCEDURE — 1101F PR PT FALLS ASSESS DOC 0-1 FALLS W/OUT INJ PAST YR: ICD-10-PCS | Mod: CPTII,S$GLB,, | Performed by: FAMILY MEDICINE

## 2022-11-21 PROCEDURE — 1126F PR PAIN SEVERITY QUANTIFIED, NO PAIN PRESENT: ICD-10-PCS | Mod: CPTII,S$GLB,, | Performed by: FAMILY MEDICINE

## 2022-11-21 PROCEDURE — 3079F PR MOST RECENT DIASTOLIC BLOOD PRESSURE 80-89 MM HG: ICD-10-PCS | Mod: CPTII,S$GLB,, | Performed by: FAMILY MEDICINE

## 2022-11-21 PROCEDURE — 3288F PR FALLS RISK ASSESSMENT DOCUMENTED: ICD-10-PCS | Mod: CPTII,S$GLB,, | Performed by: FAMILY MEDICINE

## 2022-11-21 RX ORDER — FLUTICASONE PROPIONATE 50 MCG
SPRAY, SUSPENSION (ML) NASAL
Qty: 16 G | Refills: 12 | Status: SHIPPED | OUTPATIENT
Start: 2022-11-21 | End: 2023-02-03

## 2022-11-21 NOTE — PROGRESS NOTES
PLAN:      Problem List Items Addressed This Visit       Encounter for long-term (current) use of medications (Chronic)     Complete history and physical was completed today.  Complete and thorough medication reconciliation was performed.  Discussed risks and benefits of medications.  Advised patient on orders and health maintenance.  We discussed old records and old labs if available.  Will request any records not available through epic.  Continue current medications listed on your summary sheet.           Insulin resistance     Restart Ozempic at 0.25 milligram weekly.  Titrate up to 1 milligram as tolerated.  Monitor for weight loss targeting 10 to 15% body weight over the next few months.    We will plan to monitor hemoglobin A1c at designated intervals 3 to 6 months.  I recommend ongoing Education for diabetic diet and exercise protocol.  We will continue to monitor for side effects.    Please be advised of symptoms to monitor for and to notify me immediately if persistent or worsening.  Follow up with Ophthalmology/Optometry and Podiatry at least annually.           Dysfunction of both eustachian tubes - Primary     Proceed with hearing aids.  Right tympanic membrane is retracted.  Likely eustachian tube dysfunction possibly from recent air travel.  Start Flonase and follow-up with ENT if no improvement.  Restart Zyrtec and Singulair.         Relevant Medications    fluticasone propionate (FLONASE) 50 mcg/actuation nasal spray     Other Visit Diagnoses       Need for vaccination        Relevant Orders    (In Office Administered) Zoster Recombinant Vaccine (Completed)        Discussed risk and benefits of Shingrix vaccination.  Patient agreed to proceed.  Patient tolerated 1st Shingrix vaccination well.  Future Appointments       Date Provider Specialty Appt Notes    11/23/2022 [facility] h/a fitting (Anca)    11/30/2022 [facility] h/a f/u (Anca)    12/13/2022 Anca ACHARYA  [facility] h/a f/u (Anca)    7/28/2023 Dustin Reyes MD Family Medicine Annual           Medication Management for assessment above:   Medication List with Changes/Refills   New Medications    FLUTICASONE PROPIONATE (FLONASE) 50 MCG/ACTUATION NASAL SPRAY    Use 2 puffs in each nostril q day.   Current Medications    FAMOTIDINE (PEPCID) 20 MG TABLET    Take 1 tablet (20 mg total) by mouth nightly as needed for Heartburn.    FLUOCINOLONE (SYNALAR) 0.01 % SHAM    Apply topically once daily at 6am.    HYDROXYZINE HCL (ATARAX) 25 MG TABLET    Take 1/2 to 1 tablet for itching as needed. **MAY CAUSE DROWSINESS**    KETOCONAZOLE (NIZORAL) 2 % SHAMPOO        MONTELUKAST (SINGULAIR) 10 MG TABLET    TAKE 1 TABLET BY MOUTH EVERY EVENING    OZEMPIC 0.25 MG OR 0.5 MG(2 MG/1.5 ML) PEN INJECTOR    Inject 0.5 mg into the skin every 7 days.   Discontinued Medications    AZITHROMYCIN (Z-NICOLASA) 250 MG TABLET    Take as directed.    DICLOFENAC (VOLTAREN) 75 MG EC TABLET    Take 1 tablet (75 mg total) by mouth 2 (two) times daily.    DOXYCYCLINE (MONODOX) 100 MG CAPSULE    TAKE ONE CAPSULE BY MOUTH ONCE DAILY    DOXYCYCLINE (VIBRAMYCIN) 100 MG CAP    Take 1 capsule (100 mg total) by mouth every 12 (twelve) hours.       Dustin Reyes M.D.  ==========================================================================  Subjective:   Patient ID: Blayne Mendoza is a 67 y.o. male.  has a past medical history of Allergy, Basal cell carcinoma, Cancer, Colon polyp, Erosive gastritis (6/25/2020), and Insulin resistance (7/28/2021).   Chief Complaint: Immunizations      Problem List Items Addressed This Visit       Encounter for long-term (current) use of medications (Chronic)    Overview     November 2022: Reviewed labs.  CHRONIC. Stable. Compliant with medications for managed conditions. See medication list. No SE reported.   Routine lab analysis is being monitored. Refills were addressed.  Lab Results   Component Value Date     WBC 5.71 07/28/2022    HGB 15.1 07/28/2022    HCT 46.5 07/28/2022    MCV 96 07/28/2022     07/28/2022       Chemistry        Component Value Date/Time     07/28/2022 0828    K 4.7 07/28/2022 0828     07/28/2022 0828    CO2 27 07/28/2022 0828    BUN 16 07/28/2022 0828    CREATININE 1.0 07/28/2022 0828    GLU 96 07/28/2022 0828        Component Value Date/Time    CALCIUM 9.7 07/28/2022 0828    ALKPHOS 74 07/28/2022 0828    AST 19 07/28/2022 0828    ALT 25 07/28/2022 0828    BILITOT 0.8 07/28/2022 0828    ESTGFRAFRICA >60.0 07/28/2022 0828    EGFRNONAA >60.0 07/28/2022 0828          Lab Results   Component Value Date    TSH 1.579 07/28/2021            Current Assessment & Plan     Complete history and physical was completed today.  Complete and thorough medication reconciliation was performed.  Discussed risks and benefits of medications.  Advised patient on orders and health maintenance.  We discussed old records and old labs if available.  Will request any records not available through epic.  Continue current medications listed on your summary sheet.           Insulin resistance    Overview     November 2022:  Patient tolerating Ozempic well.  However he has been off of it for a few weeks due to recent travel.    BMI Readings from Last 10 Encounters:   11/21/22 31.87 kg/m²   09/12/22 31.99 kg/m²   07/28/22 32.24 kg/m²   07/28/21 32.33 kg/m²   07/26/21 32.25 kg/m²   10/08/20 31.19 kg/m²   07/28/20 30.62 kg/m²   06/25/20 29.90 kg/m²   10/01/19 29.97 kg/m²   08/12/19 29.60 kg/m²       Patient denies any history of thyroid cancer, pancreatitis, MEN syndrome.   Management Status    Statin: Not taking  ACE/ARB: Not taking    Screening or Prevention Patient's value Goal Complete/Controlled?   HgA1C Testing and Control   Lab Results   Component Value Date    HGBA1C 5.5 07/28/2022      Annually/Less than 8% Yes   Lipid profile : 07/28/2022 Annually Yes   LDL control Lab Results   Component Value Date     LDLCALC 161.8 (H) 07/28/2022    Annually/Less than 100 mg/dl  No   Nephropathy screening No results found for: LABMICR  Lab Results   Component Value Date    PROTEINUA 3+ (A) 06/27/2018     No results found for: UTPCR   Annually No   Blood pressure BP Readings from Last 1 Encounters:   11/21/22 134/88    Less than 140/90 Yes   Dilated retinal exam Most Recent Eye Exam Date: Not Found Annually No   Foot exam   Most Recent Foot Exam Date: Not Found Annually No            Current Assessment & Plan     Restart Ozempic at 0.25 milligram weekly.  Titrate up to 1 milligram as tolerated.  Monitor for weight loss targeting 10 to 15% body weight over the next few months.    We will plan to monitor hemoglobin A1c at designated intervals 3 to 6 months.  I recommend ongoing Education for diabetic diet and exercise protocol.  We will continue to monitor for side effects.    Please be advised of symptoms to monitor for and to notify me immediately if persistent or worsening.  Follow up with Ophthalmology/Optometry and Podiatry at least annually.           Dysfunction of both eustachian tubes - Primary    Overview     Chronic.  Recurrent.  Patient has been taking a lot of flights over the past few months.  Patient reports that his right ear has discomfort.  Patient reports he has decreased hearing was recommended to get hearing aids.         Current Assessment & Plan     Proceed with hearing aids.  Right tympanic membrane is retracted.  Likely eustachian tube dysfunction possibly from recent air travel.  Start Flonase and follow-up with ENT if no improvement.  Restart Zyrtec and Singulair.          Other Visit Diagnoses       Need for vaccination                 Review of patient's allergies indicates:   Allergen Reactions    Penicillins      Other reaction(s): Swelling    Sulfa (sulfonamide antibiotics)      Other reaction(s): Rash    Bactrim  [sulfamethoxazole-trimethoprim] Rash     Current Outpatient Medications   Medication  Instructions    famotidine (PEPCID) 20 mg, Oral, Nightly PRN    fluocinolone (SYNALAR) 0.01 % Sham Topical, Daily    fluticasone propionate (FLONASE) 50 mcg/actuation nasal spray Use 2 puffs in each nostril q day.    hydrOXYzine HCL (ATARAX) 25 MG tablet Take 1/2 to 1 tablet for itching as needed. **MAY CAUSE DROWSINESS**    ketoconazole (NIZORAL) 2 % shampoo No dose, route, or frequency recorded.    montelukast (SINGULAIR) 10 mg tablet TAKE 1 TABLET BY MOUTH EVERY EVENING    OZEMPIC 0.5 mg, Subcutaneous, Every 7 days      I have reviewed the PMH, social history, FamilyHx, surgical history, allergies and medications documented / confirmed by the patient at the time of this visit.  Review of Systems   Constitutional:  Negative for chills, fatigue, fever and unexpected weight change.   HENT:  Positive for ear pain and hearing loss. Negative for sore throat.    Eyes:  Negative for redness and visual disturbance.   Respiratory:  Negative for cough and shortness of breath.    Cardiovascular:  Negative for chest pain and palpitations.   Gastrointestinal:  Negative for nausea and vomiting.   Endocrine: Negative for cold intolerance and heat intolerance.   Genitourinary:  Negative for difficulty urinating and hematuria.   Musculoskeletal:  Negative for arthralgias and myalgias.   Skin:  Negative for rash and wound.   Allergic/Immunologic: Negative for environmental allergies and food allergies.   Neurological:  Negative for weakness and headaches.   Hematological:  Negative for adenopathy. Does not bruise/bleed easily.   Psychiatric/Behavioral:  Negative for sleep disturbance. The patient is not nervous/anxious.    Objective:   /88   Pulse 72   Temp 97.2 °F (36.2 °C) (Other (see comments))   Ht 6' (1.829 m)   Wt 106.6 kg (235 lb)   SpO2 98%   BMI 31.87 kg/m²   Physical Exam  Vitals and nursing note reviewed.   Constitutional:       General: He is not in acute distress.     Appearance: He is well-developed. He is  not diaphoretic.   HENT:      Head: Normocephalic and atraumatic.      Right Ear: External ear normal. A middle ear effusion is present. Tympanic membrane is retracted. Tympanic membrane is not scarred, perforated or erythematous.      Left Ear: External ear normal. A middle ear effusion is present. Tympanic membrane is not scarred, perforated, erythematous, retracted or bulging.      Nose: Nose normal. No rhinorrhea.   Eyes:      Extraocular Movements: Extraocular movements intact.      Pupils: Pupils are equal, round, and reactive to light.   Cardiovascular:      Rate and Rhythm: Normal rate.      Pulses: Normal pulses.   Pulmonary:      Effort: Pulmonary effort is normal. No respiratory distress.      Breath sounds: Normal breath sounds.   Musculoskeletal:         General: Normal range of motion.      Cervical back: Normal range of motion and neck supple.   Skin:     General: Skin is warm and dry.      Capillary Refill: Capillary refill takes less than 2 seconds.      Findings: No rash.   Neurological:      General: No focal deficit present.      Mental Status: He is alert and oriented to person, place, and time.   Psychiatric:         Attention and Perception: He is attentive.         Mood and Affect: Mood normal. Mood is not anxious or depressed. Affect is not labile, blunt, angry or inappropriate.         Speech: He is communicative. Speech is not rapid and pressured, delayed, slurred or tangential.         Behavior: Behavior normal. Behavior is not agitated, slowed, aggressive, withdrawn, hyperactive or combative.         Thought Content: Thought content normal. Thought content is not paranoid or delusional. Thought content does not include homicidal or suicidal ideation. Thought content does not include homicidal or suicidal plan.         Cognition and Memory: Memory is not impaired.         Judgment: Judgment normal. Judgment is not impulsive or inappropriate.       Assessment:     1. Dysfunction of both  eustachian tubes    2. Need for vaccination    3. Encounter for long-term (current) use of medications    4. Insulin resistance      MDM:   Moderate complexity.  Moderate risk.  Total time: 32 minutes.  This includes total time spent on the encounter, which includes face to face time and non-face to face time preparing to see the patient (eg, review of previous medical records, tests), Obtaining and/or reviewing separately obtained history, documenting clinical information in the electronic or other health record, independently interpreting results (not separately reported)/communicating results to the patient/family/caregiver, and/or care coordination (not separately reported).    I have Reviewed and summarized old records.  I have performed thorough medication reconciliation today and discussed risk and benefits of medications.  I have reviewed labs and discussed with patient.  All questions were answered.  I am requesting old records and will review them once they are available. ENT    I have signed for the following orders AND/OR meds.  Orders Placed This Encounter   Procedures    (In Office Administered) Zoster Recombinant Vaccine     Medications Ordered This Encounter   Medications    fluticasone propionate (FLONASE) 50 mcg/actuation nasal spray     Sig: Use 2 puffs in each nostril q day.     Dispense:  16 g     Refill:  12        Follow up if symptoms worsen or fail to improve.  Future Appointments       Date Provider Specialty Appt Notes    11/23/2022 [facility] h/a fitting ([facility])    11/30/2022 Anca Olsen [facility] h/a f/u ([facility])    12/13/2022 Anca Olsen [facility] h/a f/u ([facility])    7/28/2023 Dustin Reyes MD Family Medicine Annual          If no improvement in symptoms or symptoms worsen, advised to call/follow-up at clinic or go to ER. Patient voiced understanding and all questions/concerns were addressed.   DISCLAIMER: This note was compiled by using a speech  recognition dictation system and therefore please be aware that typographical / speech recognition errors can and do occur.  Please contact me if you see any errors specifically.    Dustin Reyes M.D.       Office: [phone]   [address] [place] [address]  FAX: [phone]

## 2022-11-21 NOTE — ASSESSMENT & PLAN NOTE
Proceed with hearing aids.  Right tympanic membrane is retracted.  Likely eustachian tube dysfunction possibly from recent air travel.  Start Flonase and follow-up with ENT if no improvement.  Restart Zyrtec and Singulair.

## 2022-11-21 NOTE — ASSESSMENT & PLAN NOTE
Restart Ozempic at 0.25 milligram weekly.  Titrate up to 1 milligram as tolerated.  Monitor for weight loss targeting 10 to 15% body weight over the next few months.    We will plan to monitor hemoglobin A1c at designated intervals 3 to 6 months.  I recommend ongoing Education for diabetic diet and exercise protocol.  We will continue to monitor for side effects.    Please be advised of symptoms to monitor for and to notify me immediately if persistent or worsening.  Follow up with Ophthalmology/Optometry and Podiatry at least annually.

## 2022-11-21 NOTE — PATIENT INSTRUCTIONS
Follow up if symptoms worsen or fail to improve.     Dear patient,   As a result of recent federal legislation (The Federal Cures Act), you may receive lab or pathology results from your visit in your [facility] account before your physician is able to contact you. Your physician or their representative will relay the results to you with their recommendations at their soonest availability.     If no improvement in symptoms or symptoms worsen, please be advised to call MD, follow-up at clinic and/or go to ER if becomes severe.    Dustin Reyes M.D.        We Offer TELEHEALTH & Same Day Appointments!   Book your Telehealth appointment with me through my nurse or   Clinic appointments on [facility]!    [address] [place] [address]    Office: [phone]   FAX: [phone]    Check out my Facebook Page and Follow Me at: [email or web address]    Check out my website at [email or web address] by clicking on: [email or web address]    To Schedule appointments online, go to [email or web address]: [email or web address]

## 2022-11-23 ENCOUNTER — CLINICAL SUPPORT (OUTPATIENT)
Dept: AUDIOLOGY | Facility: CLINIC | Age: 67
End: 2022-11-23
Payer: COMMERCIAL

## 2022-11-23 PROCEDURE — COVTAX COVINGTON PRESCRIBED 4.25%: ICD-10-PCS | Mod: CSM,S$GLB,, | Performed by: AUDIOLOGIST

## 2022-11-23 PROCEDURE — COVTAX COVINGTON PRESCRIBED 4.25%: Mod: CSM,S$GLB,, | Performed by: AUDIOLOGIST

## 2022-11-23 PROCEDURE — V5140 BEHIND EAR BINAUR HEARING AI: HCPCS | Mod: CSM,S$GLB,, | Performed by: AUDIOLOGIST

## 2022-11-23 PROCEDURE — V5140 PR BEHIND EAR BINAUR HEARING AI: ICD-10-PCS | Mod: CSM,S$GLB,, | Performed by: AUDIOLOGIST

## 2022-11-23 NOTE — PROGRESS NOTES
The patient was seen today for a hearing aid fitting. He was fitted with a combioniceLydia L90-RL hearing aid in graphite gray with a size 2xM speaker in each ear. The serial numbers are [id number] and [id number]. Target gain was set at 80% in each aid.  Instruction was given on insertion/removal of the hearing aids.  The patient demonstrated appropriate insertion/removal of the hearing aids. Care and use of the hearing aids and hearing aid  were discussed.Repair and loss&damage warranties were discussed. The patient was scheduled for a follow-up appointment. At that time, further instruction will be given on removal/insertion of the wax filter. Also, when the patient returns, we will consider increasing the target gain level. The patient paid in full for the hearing aids.

## 2022-11-30 ENCOUNTER — CLINICAL SUPPORT (OUTPATIENT)
Dept: AUDIOLOGY | Facility: CLINIC | Age: 67
End: 2022-11-30
Payer: COMMERCIAL

## 2022-11-30 PROCEDURE — 99499 UNLISTED E&M SERVICE: CPT | Mod: S$GLB,,, | Performed by: AUDIOLOGIST

## 2022-11-30 PROCEDURE — 99499 NO LOS: ICD-10-PCS | Mod: S$GLB,,, | Performed by: AUDIOLOGIST

## 2022-11-30 NOTE — PROGRESS NOTES
The patient was seen for a hearing aid follow-up appointment.  The patient indicated that he is doing well with the hearing aids, wearing the aids regularly  and hearing better.  The target gain was increased to 90% with Auto Acclimatization set to increase the gain to 100% in 7 days.  Speech Enhancer was activated.  A retention hook was added to the left and right hearing aid.  The patient was instructed on how to insert/remove the aids with the added retention hooks, and he demonstrated this appropriately.  Instruction was given on how to change the wax filter.  Extra domes (size small open) and wax filters were given to the patient.  The patient is scheduled for a follow-up appointment.  At that time we will consider activating the volume control and pairing the aids to the patient's phone for use of the [employer] Leonor and for streaming phone calls/other audio.  The [employer] Leonor was installed on the patient's phone today.

## 2022-12-12 ENCOUNTER — PATIENT MESSAGE (OUTPATIENT)
Dept: AUDIOLOGY | Facility: CLINIC | Age: 67
End: 2022-12-12
Payer: COMMERCIAL

## 2022-12-13 ENCOUNTER — CLINICAL SUPPORT (OUTPATIENT)
Dept: AUDIOLOGY | Facility: CLINIC | Age: 67
End: 2022-12-13
Payer: COMMERCIAL

## 2022-12-13 PROCEDURE — 99499 NO LOS: ICD-10-PCS | Mod: S$GLB,,, | Performed by: AUDIOLOGIST

## 2022-12-13 PROCEDURE — 99499 UNLISTED E&M SERVICE: CPT | Mod: S$GLB,,, | Performed by: AUDIOLOGIST

## 2022-12-13 NOTE — PROGRESS NOTES
The patient was seen for a hearing aid follow-up appointment.  The patient indicated that he is doing well with the hearing aids, hearing better and wearing the hearing aids regularly.  The volume control was activated, and the patient's hearing aids were paired with his phone for use of the [place] Leonor which the patient had already downloaded on his phone.  The patient was instructed on how to change the volume using the hearing aid multi-function button and using the Leonor.  Instruction was given on other uses of the Leonor as well.  The patient was given instruction on all uses of the multi-function button.  The patient's hearing aids were also paired with his phone for streaming phone conversations and other audio.  The patient was instructed on use of this feature, and it was demonstrated today (including use of Tap Control).  The patient reported he removed the retention hook from the right aid, and the retention hook on the left aid was removed today, at the patient 's request.  The patient reported he is not interested in trying power speakers, for comparison.  Annual audiological evaluations were recommended.  The patient will contact us as needed.

## 2023-01-04 ENCOUNTER — TELEPHONE (OUTPATIENT)
Dept: FAMILY MEDICINE | Facility: CLINIC | Age: 68
End: 2023-01-04

## 2023-01-04 ENCOUNTER — OFFICE VISIT (OUTPATIENT)
Dept: FAMILY MEDICINE | Facility: CLINIC | Age: 68
End: 2023-01-04
Payer: COMMERCIAL

## 2023-01-04 VITALS
BODY MASS INDEX: 32.25 KG/M2 | HEART RATE: 88 BPM | WEIGHT: 238.13 LBS | SYSTOLIC BLOOD PRESSURE: 138 MMHG | DIASTOLIC BLOOD PRESSURE: 88 MMHG | HEIGHT: 72 IN | OXYGEN SATURATION: 98 %

## 2023-01-04 DIAGNOSIS — Z91.89 RISK FOR CORONARY ARTERY DISEASE GREATER THAN 20% IN NEXT 10 YEARS PER FRAMINGHAM SCORE: ICD-10-CM

## 2023-01-04 DIAGNOSIS — H91.93 BILATERAL HEARING LOSS, UNSPECIFIED HEARING LOSS TYPE: ICD-10-CM

## 2023-01-04 DIAGNOSIS — R03.0 ELEVATED BLOOD-PRESSURE READING WITHOUT DIAGNOSIS OF HYPERTENSION: ICD-10-CM

## 2023-01-04 DIAGNOSIS — E88.819 INSULIN RESISTANCE: ICD-10-CM

## 2023-01-04 DIAGNOSIS — U07.1 COVID-19: Primary | ICD-10-CM

## 2023-01-04 DIAGNOSIS — R09.81 SINUS CONGESTION: ICD-10-CM

## 2023-01-04 DIAGNOSIS — R05.1 ACUTE COUGH: ICD-10-CM

## 2023-01-04 PROBLEM — M19.019 AC JOINT ARTHROPATHY: Status: RESOLVED | Noted: 2020-07-30 | Resolved: 2023-01-04

## 2023-01-04 PROBLEM — R10.13 EPIGASTRIC PAIN: Status: RESOLVED | Noted: 2020-06-25 | Resolved: 2023-01-04

## 2023-01-04 PROBLEM — K52.9 COLITIS: Status: RESOLVED | Noted: 2020-06-25 | Resolved: 2023-01-04

## 2023-01-04 PROBLEM — K29.60 EROSIVE GASTRITIS: Status: RESOLVED | Noted: 2020-06-25 | Resolved: 2023-01-04

## 2023-01-04 PROBLEM — M25.561 RIGHT KNEE PAIN: Chronic | Status: RESOLVED | Noted: 2020-07-28 | Resolved: 2023-01-04

## 2023-01-04 PROBLEM — K62.5 RECTAL BLEEDING: Status: RESOLVED | Noted: 2020-06-25 | Resolved: 2023-01-04

## 2023-01-04 PROCEDURE — 1101F PR PT FALLS ASSESS DOC 0-1 FALLS W/OUT INJ PAST YR: ICD-10-PCS | Mod: CPTII,S$GLB,, | Performed by: NURSE PRACTITIONER

## 2023-01-04 PROCEDURE — 3079F PR MOST RECENT DIASTOLIC BLOOD PRESSURE 80-89 MM HG: ICD-10-PCS | Mod: CPTII,S$GLB,, | Performed by: NURSE PRACTITIONER

## 2023-01-04 PROCEDURE — 1101F PT FALLS ASSESS-DOCD LE1/YR: CPT | Mod: CPTII,S$GLB,, | Performed by: NURSE PRACTITIONER

## 2023-01-04 PROCEDURE — 1126F PR PAIN SEVERITY QUANTIFIED, NO PAIN PRESENT: ICD-10-PCS | Mod: CPTII,S$GLB,, | Performed by: NURSE PRACTITIONER

## 2023-01-04 PROCEDURE — 99999 PR PBB SHADOW E&M-EST. PATIENT-LVL IV: CPT | Mod: PBBFAC,,, | Performed by: NURSE PRACTITIONER

## 2023-01-04 PROCEDURE — 99999 PR PBB SHADOW E&M-EST. PATIENT-LVL IV: ICD-10-PCS | Mod: PBBFAC,,, | Performed by: NURSE PRACTITIONER

## 2023-01-04 PROCEDURE — 1159F PR MEDICATION LIST DOCUMENTED IN MEDICAL RECORD: ICD-10-PCS | Mod: CPTII,S$GLB,, | Performed by: NURSE PRACTITIONER

## 2023-01-04 PROCEDURE — 3288F FALL RISK ASSESSMENT DOCD: CPT | Mod: CPTII,S$GLB,, | Performed by: NURSE PRACTITIONER

## 2023-01-04 PROCEDURE — 1126F AMNT PAIN NOTED NONE PRSNT: CPT | Mod: CPTII,S$GLB,, | Performed by: NURSE PRACTITIONER

## 2023-01-04 PROCEDURE — 3075F SYST BP GE 130 - 139MM HG: CPT | Mod: CPTII,S$GLB,, | Performed by: NURSE PRACTITIONER

## 2023-01-04 PROCEDURE — 1160F RVW MEDS BY RX/DR IN RCRD: CPT | Mod: CPTII,S$GLB,, | Performed by: NURSE PRACTITIONER

## 2023-01-04 PROCEDURE — 3079F DIAST BP 80-89 MM HG: CPT | Mod: CPTII,S$GLB,, | Performed by: NURSE PRACTITIONER

## 2023-01-04 PROCEDURE — 1160F PR REVIEW ALL MEDS BY PRESCRIBER/CLIN PHARMACIST DOCUMENTED: ICD-10-PCS | Mod: CPTII,S$GLB,, | Performed by: NURSE PRACTITIONER

## 2023-01-04 PROCEDURE — 99214 PR OFFICE/OUTPT VISIT, EST, LEVL IV, 30-39 MIN: ICD-10-PCS | Mod: S$GLB,,, | Performed by: NURSE PRACTITIONER

## 2023-01-04 PROCEDURE — 3008F PR BODY MASS INDEX (BMI) DOCUMENTED: ICD-10-PCS | Mod: CPTII,S$GLB,, | Performed by: NURSE PRACTITIONER

## 2023-01-04 PROCEDURE — 3075F PR MOST RECENT SYSTOLIC BLOOD PRESS GE 130-139MM HG: ICD-10-PCS | Mod: CPTII,S$GLB,, | Performed by: NURSE PRACTITIONER

## 2023-01-04 PROCEDURE — 1159F MED LIST DOCD IN RCRD: CPT | Mod: CPTII,S$GLB,, | Performed by: NURSE PRACTITIONER

## 2023-01-04 PROCEDURE — 3008F BODY MASS INDEX DOCD: CPT | Mod: CPTII,S$GLB,, | Performed by: NURSE PRACTITIONER

## 2023-01-04 PROCEDURE — 3288F PR FALLS RISK ASSESSMENT DOCUMENTED: ICD-10-PCS | Mod: CPTII,S$GLB,, | Performed by: NURSE PRACTITIONER

## 2023-01-04 PROCEDURE — 99214 OFFICE O/P EST MOD 30 MIN: CPT | Mod: S$GLB,,, | Performed by: NURSE PRACTITIONER

## 2023-01-04 RX ORDER — PROMETHAZINE HYDROCHLORIDE AND DEXTROMETHORPHAN HYDROBROMIDE 6.25; 15 MG/5ML; MG/5ML
5 SYRUP ORAL EVERY 6 HOURS PRN
Qty: 180 ML | Refills: 0 | Status: SHIPPED | OUTPATIENT
Start: 2023-01-04 | End: 2023-01-14

## 2023-01-04 RX ORDER — DOXYCYCLINE 100 MG/1
100 CAPSULE ORAL 2 TIMES DAILY
COMMUNITY
Start: 2023-01-02 | End: 2023-02-03

## 2023-01-04 RX ORDER — PREDNISONE 20 MG/1
40 TABLET ORAL DAILY
Qty: 10 TABLET | Refills: 0 | Status: SHIPPED | OUTPATIENT
Start: 2023-01-04 | End: 2023-01-09

## 2023-01-04 NOTE — PROGRESS NOTES
Assessment/Plan:  Problem List Items Addressed This Visit          ENT    Hearing loss    Overview     Chronic. Stable. Following with ENT and audiology. Using hearing aids bilaterally.             Cardiac/Vascular    Elevated blood-pressure reading without diagnosis of hypertension    Overview     No prior diagnosis of hypertension and the blood pressure has been high on recent checks (178/88). Not currently on any antihypertensives. He has had headaches and dizziness but did test positive for covid x2 days ago. The patient has had no vision changes, chest pain, shortness of breath, palpitations.     Recommend ambulatory monitoring of BP. Notify PCP of fluctuations in readings or if BP remains greater than 140/80. RTC in 2 weeks for BP follow up.              Risk for coronary artery disease greater than 20% in next 10 years per [place] score    Overview     The 10-year ASCVD risk score (Jesus CHILDERS, et al., 2019) is: 20.7%    Values used to calculate the score:      Age: 67 years      Sex: Male      Is Non- : No      Diabetic: No      Tobacco smoker: No      Systolic Blood Pressure: 138 mmHg      Is BP treated: No      HDL Cholesterol: 43 mg/dL      Total Cholesterol: 232 mg/dL     Reviewed with patient. Currently being treated for covid. Closely follow up. Consider low-dose ASA and statin for primary prevention.             Endocrine    Insulin resistance    Overview     November 2022:  Patient tolerating Ozempic well.  However he has been off of it for a few weeks due to recent travel.    BMI Readings from Last 10 Encounters:   11/21/22 31.87 kg/m²   09/12/22 31.99 kg/m²   07/28/22 32.24 kg/m²   07/28/21 32.33 kg/m²   07/26/21 32.25 kg/m²   10/08/20 31.19 kg/m²   07/28/20 30.62 kg/m²   06/25/20 29.90 kg/m²   10/01/19 29.97 kg/m²   08/12/19 29.60 kg/m²       Patient denies any history of thyroid cancer, pancreatitis, MEN syndrome.   Management Status    Statin: Not taking  ACE/ARB: Not  taking    Screening or Prevention Patient's value Goal Complete/Controlled?   HgA1C Testing and Control   Lab Results   Component Value Date    HGBA1C 5.5 07/28/2022      Annually/Less than 8% Yes   Lipid profile : 07/28/2022 Annually Yes   LDL control Lab Results   Component Value Date    LDLCALC 161.8 (H) 07/28/2022    Annually/Less than 100 mg/dl  No   Nephropathy screening No results found for: LABMICR  Lab Results   Component Value Date    PROTEINUA 3+ (A) 06/27/2018     No results found for: UTPCR   Annually No   Blood pressure BP Readings from Last 1 Encounters:   11/21/22 134/88    Less than 140/90 Yes   Dilated retinal exam Most Recent Eye Exam Date: Not Found Annually No   Foot exam   Most Recent Foot Exam Date: Not Found Annually No            Current Assessment & Plan     Chronic. Stable. Started on Ozempic. Tolerated well with no adverse effects reported. Plans to work on healthy diet and exercise. He is planning to start seeing .     We will plan to monitor hemoglobin A1c at designated intervals 3 to 6 months.  I recommend ongoing Education for diabetic diet and exercise protocol.  We will continue to monitor for side effects.    Please be advised of symptoms to monitor for and to notify me immediately if persistent or worsening.  Follow up with Ophthalmology/Optometry and Podiatry at least annually.    Lab Results   Component Value Date    HGBA1C 5.5 07/28/2022             Other Visit Diagnoses       COVID-19    -  Primary    Acute cough        Relevant Medications    promethazine-dextromethorphan (PROMETHAZINE-DM) 6.25-15 mg/5 mL Syrp    Sinus congestion        Relevant Medications    predniSONE (DELTASONE) 20 MG tablet          Covid- positive   Recent CXR reviewed- no acute findings   Start short coarse of steroids. Risk of corticosteroids reviewed (elevated BP/glucose, insomnia, psychosis, bone loss, etc) and patient expressed understanding.   Trial of promethazine DM for cough  No  indications for ABX; discussed. Consider ABX if no improvement in self-limiting illness.   Supportive care- rest, increase hydration with water, OTC Tylenol/Ibuprofen for pain/fever.  Advised to quarantine for 5 full days and notify those you have tested positive for covid. Take precautions until day 10.   Follow up in about 2 weeks (around 1/18/2023), or if symptoms worsen or fail to improve.  ER precautions for severe or worsening symptoms.      Hannah Anguiano NP  _____________________________________________________________________________________________________________________________________________________    CC: covid concerns     HPI: Patient is a 67-year-old male who presents in clinic today as an established patient here for covid concerns. He reports recently going to urgent care on 1/2/23 where he tested positive for covid. This is a new problem. The current episode started x3 days ago. The problem is gradually worsening. There has been fever, max 103.9F. He is experiencing no pain. Associated symptoms include nasal congestion, postnasal drip, cough, N/V/D, headache, ear pressure, dizziness, fatigue, body aches. Pertinent negatives include no hoarse voice, neck pain, chest pain, shortness of breath, sneezing, or swollen glands. Past treatments include Doxycycline (though recent CXR was normal per radiology) . The treatment provided no relief. He has had no known sick contacts. He is fully vaccinated for covid. He has had steroids approximately 3 weeks ago which reportedly typically work well for him. Advised do not recommend frequent use of steroids, however, no history of diabetes. He does note being insulin resistant. Risk vs benefit discussed.      He also complains about elevated blood pressure readings. The patient has no prior diagnosis of hypertension and the blood pressure has been high on recent checks (178/88). The patient is not currently taking any antihypertensives. He has had headaches  and dizziness which may be attributed to covid.  The patient has had no vision changes, chest pain, shortness of breath, palpitations.  Denies any identifiable exacerbating or relieving factors.    XR Chest PA And Lateral  REASON FOR EXAM: [R50.9]-Fever, unspecified / [U07.1]-COVID-19 / [R05.1]-Acute cough   TECHNICAL FACTORS: 2 views   COMPARISON: 10/17/2004   FINDINGS: The lungs are clear. The cardiac silhouette is normal. Pulmonary vasculature is within normal limits. There is no evidence of pleural effusion or pneumothorax. Osseous structures are unremarkable.     IMPRESSION:     No acute findings.   Electronically signed by Joshua De Oliveira MD on 1/2/2023 2:28 PM    Past Medical History:  Past Medical History:   Diagnosis Date    Allergy     Basal cell carcinoma     Cancer     Prostate    Colon polyp     Erosive gastritis 6/25/2020    Insulin resistance 7/28/2021     Past Surgical History:   Procedure Laterality Date    ADENOIDECTOMY  1966    Tonsils and adenoids removed    COLONOSCOPY N/A 12/1/2015    Procedure: COLONOSCOPY;  Surgeon: Rod Rayo MD;  Location: [facility];  Service: Endoscopy;  Laterality: N/A;    COLONOSCOPY N/A 6/25/2020    Procedure: COLONOSCOPY;  Surgeon: Rod Rayo MD;  Location: [facility];  Service: Endoscopy;  Laterality: N/A;    ESOPHAGOGASTRODUODENOSCOPY N/A 6/25/2020    Procedure: ESOPHAGOGASTRODUODENOSCOPY (EGD);  Surgeon: Rod Rayo MD;  Location: [facility];  Service: Endoscopy;  Laterality: N/A;    PROSTATE SURGERY  2008    radical prostatectomy    TONSILLECTOMY      Child    VASECTOMY       Review of patient's allergies indicates:   Allergen Reactions    Penicillins      Other reaction(s): Swelling    Sulfa (sulfonamide antibiotics)      Other reaction(s): Rash    Bactrim  [sulfamethoxazole-trimethoprim] Rash     Social History     Tobacco Use    Smoking status: Former     Packs/day: 0.25     Years: 5.00     Pack years: 1.25     Types: Cigarettes, Cigars     Start  date: 1977     Quit date: 1982     Years since quittin.1    Smokeless tobacco: Never    Tobacco comments:     Still crave one at times.  Makes me sick for days when I have one   Substance Use Topics    Alcohol use: Yes     Alcohol/week: 6.0 standard drinks     Types: 6 Drinks containing 0.5 oz of alcohol per week     Comment: one a day    Drug use: No     Family History   Problem Relation Age of Onset    Cancer Mother         Ovarian Cancer-     Current Outpatient Medications on File Prior to Visit   Medication Sig Dispense Refill    dexbrompheniramn-chlophedianol (CHLO HIST) 1-12.5 mg/5 mL Soln Take 10 mLs by mouth.      doxycycline (MONODOX) 100 MG capsule Take 100 mg by mouth 2 (two) times daily.      famotidine (PEPCID) 20 MG tablet Take 1 tablet (20 mg total) by mouth nightly as needed for Heartburn. 90 tablet 4    fluticasone propionate (FLONASE) 50 mcg/actuation nasal spray Use 2 puffs in each nostril q day. 16 g 12    hydrOXYzine HCL (ATARAX) 25 MG tablet Take 1/2 to 1 tablet for itching as needed. **MAY CAUSE DROWSINESS** 60 tablet 11    ketoconazole (NIZORAL) 2 % shampoo       OZEMPIC 0.25 mg or 0.5 mg(2 mg/1.5 mL) pen injector Inject 0.5 mg into the skin every 7 days. 3 pen 4    fluocinolone (SYNALAR) 0.01 % Sham Apply topically once daily at 6am. (Patient not taking: Reported on 2022) 120 mL 3    montelukast (SINGULAIR) 10 mg tablet TAKE 1 TABLET BY MOUTH EVERY EVENING (Patient not taking: Reported on 2022) 90 tablet 4     No current facility-administered medications on file prior to visit.     Review of Systems   Constitutional:  Positive for fatigue and fever. Negative for appetite change and chills.   HENT:  Positive for congestion, ear pain and postnasal drip. Negative for rhinorrhea and sore throat.    Eyes:  Negative for visual disturbance.   Respiratory:  Positive for cough. Negative for shortness of breath.    Cardiovascular:  Negative for chest pain, palpitations  and leg swelling.   Gastrointestinal:  Positive for diarrhea, nausea and vomiting. Negative for abdominal pain, blood in stool and constipation.   Genitourinary:  Negative for difficulty urinating, dysuria and hematuria.   Musculoskeletal:  Positive for arthralgias. Negative for myalgias and neck pain.   Skin:  Negative for rash and wound.   Neurological:  Positive for dizziness and headaches. Negative for syncope, speech difficulty, weakness and numbness.   Psychiatric/Behavioral:  Negative for behavioral problems. The patient is not nervous/anxious.      Vitals:    01/04/23 0949   BP: 138/88   BP Location: Right arm   Pulse: 88   SpO2: 98%   Weight: 108 kg (238 lb 1.6 oz)   Height: 6' (1.829 m)     Wt Readings from Last 3 Encounters:   01/04/23 108 kg (238 lb 1.6 oz)   11/21/22 106.6 kg (235 lb)   09/12/22 107 kg (235 lb 14.3 oz)     Physical Exam  Vitals reviewed.   Constitutional:       General: He is not in acute distress.     Appearance: Normal appearance. He is not ill-appearing.   HENT:      Head: Normocephalic and atraumatic.      Right Ear: External ear normal. Tympanic membrane is injected. Tympanic membrane is not erythematous.      Left Ear: External ear normal. Tympanic membrane is injected. Tympanic membrane is not erythematous.      Nose: Congestion present.      Mouth/Throat:      Mouth: Mucous membranes are moist.      Pharynx: Posterior oropharyngeal erythema present. No oropharyngeal exudate.   Eyes:      Extraocular Movements: Extraocular movements intact.      Conjunctiva/sclera: Conjunctivae normal.   Cardiovascular:      Rate and Rhythm: Normal rate.      Heart sounds: Normal heart sounds.   Pulmonary:      Effort: Pulmonary effort is normal. No respiratory distress.      Breath sounds: Normal breath sounds. No wheezing.   Abdominal:      General: Bowel sounds are normal.      Palpations: Abdomen is soft.      Tenderness: There is no abdominal tenderness.   Musculoskeletal:         General:  Normal range of motion.      Cervical back: Normal range of motion and neck supple.   Lymphadenopathy:      Cervical: No cervical adenopathy.   Skin:     General: Skin is warm and dry.      Coloration: Skin is not pale.      Findings: No rash.   Neurological:      General: No focal deficit present.      Mental Status: He is alert and oriented to person, place, and time. Mental status is at baseline.      Gait: Gait normal.   Psychiatric:         Mood and Affect: Mood normal.         Speech: Speech normal.         Behavior: Behavior normal. Behavior is cooperative.     Health Maintenance   Topic Date Due    Abdominal Aortic Aneurysm Screening  Never done    Lipid Panel  07/28/2023    TETANUS VACCINE  11/25/2025    Hepatitis C Screening  Completed

## 2023-01-04 NOTE — TELEPHONE ENCOUNTER
----- Message from Estella Lance sent at 1/4/2023  8:06 AM CST -----  Pts wife stated they missed a call from Jcarlos and is requesting a call back at .[phone] thx jm

## 2023-01-04 NOTE — ASSESSMENT & PLAN NOTE
Chronic. Stable. Started on Ozempic. Tolerated well with no adverse effects reported. Plans to work on healthy diet and exercise. He is planning to start seeing .     We will plan to monitor hemoglobin A1c at designated intervals 3 to 6 months.  I recommend ongoing Education for diabetic diet and exercise protocol.  We will continue to monitor for side effects.    Please be advised of symptoms to monitor for and to notify me immediately if persistent or worsening.  Follow up with Ophthalmology/Optometry and Podiatry at least annually.    Lab Results   Component Value Date    HGBA1C 5.5 07/28/2022

## 2023-01-19 ENCOUNTER — CLINICAL SUPPORT (OUTPATIENT)
Dept: FAMILY MEDICINE | Facility: CLINIC | Age: 68
End: 2023-01-19
Payer: COMMERCIAL

## 2023-01-19 VITALS — DIASTOLIC BLOOD PRESSURE: 72 MMHG | HEART RATE: 79 BPM | SYSTOLIC BLOOD PRESSURE: 144 MMHG

## 2023-01-19 DIAGNOSIS — Z01.30 BP CHECK: Primary | ICD-10-CM

## 2023-01-19 PROCEDURE — 99999 PR PBB SHADOW E&M-EST. PATIENT-LVL II: ICD-10-PCS | Mod: PBBFAC,,,

## 2023-01-19 PROCEDURE — 99999 PR PBB SHADOW E&M-EST. PATIENT-LVL II: CPT | Mod: PBBFAC,,,

## 2023-01-19 NOTE — Clinical Note
Clinic machine /78 HR 79. Home machine /97 HR 83. Pt asymptomatic. Pt sat in clinic 5 minutes prior to rechecking BP. Manual /72

## 2023-01-19 NOTE — PROGRESS NOTES
Pt in clinic for BP check and to compare home machine to clinic machine. Clinic machine /78 HR 79. Home machine /97 HR 83. Pt asymptomatic. Pt sat in clinic 5 minutes prior to rechecking BP. Manual /72. PCP notified by Epic message.

## 2023-01-20 ENCOUNTER — TELEPHONE (OUTPATIENT)
Dept: FAMILY MEDICINE | Facility: CLINIC | Age: 68
End: 2023-01-20
Payer: COMMERCIAL

## 2023-01-20 NOTE — TELEPHONE ENCOUNTER
"Spoke to pt over the phone, inform him "Noted. Continue ambulatory monitoring of BP. Notify PCP of fluctuations in readings or if BP remains greater than 140/80. Follow up with PCP as scheduled in 3 weeks. ". Pt verbalized understanding   "

## 2023-01-20 NOTE — TELEPHONE ENCOUNTER
----- Message from Hannah Anguiano NP sent at 1/20/2023 12:02 PM CST -----  Noted. Continue ambulatory monitoring of BP. Notify PCP of fluctuations in readings or if BP remains greater than 140/80. Follow up with PCP as scheduled in 3 weeks.     ----- Message -----  From: Daria Rey LPN  Sent: 1/19/2023   9:41 AM CST  To: Dustin Reyes MD    Clinic machine /78 HR 79. Home machine /97 HR 83. Pt asymptomatic. Pt sat in clinic 5 minutes prior to rechecking BP. Manual /72

## 2023-01-30 ENCOUNTER — PATIENT MESSAGE (OUTPATIENT)
Dept: AUDIOLOGY | Facility: CLINIC | Age: 68
End: 2023-01-30
Payer: COMMERCIAL

## 2023-02-03 ENCOUNTER — OFFICE VISIT (OUTPATIENT)
Dept: FAMILY MEDICINE | Facility: CLINIC | Age: 68
End: 2023-02-03
Payer: COMMERCIAL

## 2023-02-03 VITALS
OXYGEN SATURATION: 98 % | BODY MASS INDEX: 32.25 KG/M2 | HEIGHT: 72 IN | HEART RATE: 97 BPM | SYSTOLIC BLOOD PRESSURE: 138 MMHG | WEIGHT: 238.13 LBS | DIASTOLIC BLOOD PRESSURE: 84 MMHG

## 2023-02-03 DIAGNOSIS — E88.819 INSULIN RESISTANCE: ICD-10-CM

## 2023-02-03 DIAGNOSIS — E78.2 MIXED HYPERLIPIDEMIA: ICD-10-CM

## 2023-02-03 DIAGNOSIS — R03.0 ELEVATED BLOOD-PRESSURE READING WITHOUT DIAGNOSIS OF HYPERTENSION: Primary | ICD-10-CM

## 2023-02-03 DIAGNOSIS — Z91.89 RISK FOR CORONARY ARTERY DISEASE GREATER THAN 20% IN NEXT 10 YEARS PER FRAMINGHAM SCORE: ICD-10-CM

## 2023-02-03 DIAGNOSIS — Z12.83 SCREENING FOR SKIN CANCER: ICD-10-CM

## 2023-02-03 PROBLEM — E78.5 HYPERLIPIDEMIA: Status: ACTIVE | Noted: 2023-02-03

## 2023-02-03 PROCEDURE — 3288F PR FALLS RISK ASSESSMENT DOCUMENTED: ICD-10-PCS | Mod: CPTII,S$GLB,, | Performed by: NURSE PRACTITIONER

## 2023-02-03 PROCEDURE — 99214 OFFICE O/P EST MOD 30 MIN: CPT | Mod: S$GLB,,, | Performed by: NURSE PRACTITIONER

## 2023-02-03 PROCEDURE — 1160F PR REVIEW ALL MEDS BY PRESCRIBER/CLIN PHARMACIST DOCUMENTED: ICD-10-PCS | Mod: CPTII,S$GLB,, | Performed by: NURSE PRACTITIONER

## 2023-02-03 PROCEDURE — 99999 PR PBB SHADOW E&M-EST. PATIENT-LVL V: CPT | Mod: PBBFAC,,, | Performed by: NURSE PRACTITIONER

## 2023-02-03 PROCEDURE — 3075F SYST BP GE 130 - 139MM HG: CPT | Mod: CPTII,S$GLB,, | Performed by: NURSE PRACTITIONER

## 2023-02-03 PROCEDURE — 99214 PR OFFICE/OUTPT VISIT, EST, LEVL IV, 30-39 MIN: ICD-10-PCS | Mod: S$GLB,,, | Performed by: NURSE PRACTITIONER

## 2023-02-03 PROCEDURE — 3079F DIAST BP 80-89 MM HG: CPT | Mod: CPTII,S$GLB,, | Performed by: NURSE PRACTITIONER

## 2023-02-03 PROCEDURE — 1126F AMNT PAIN NOTED NONE PRSNT: CPT | Mod: CPTII,S$GLB,, | Performed by: NURSE PRACTITIONER

## 2023-02-03 PROCEDURE — 1160F RVW MEDS BY RX/DR IN RCRD: CPT | Mod: CPTII,S$GLB,, | Performed by: NURSE PRACTITIONER

## 2023-02-03 PROCEDURE — 1159F PR MEDICATION LIST DOCUMENTED IN MEDICAL RECORD: ICD-10-PCS | Mod: CPTII,S$GLB,, | Performed by: NURSE PRACTITIONER

## 2023-02-03 PROCEDURE — 3079F PR MOST RECENT DIASTOLIC BLOOD PRESSURE 80-89 MM HG: ICD-10-PCS | Mod: CPTII,S$GLB,, | Performed by: NURSE PRACTITIONER

## 2023-02-03 PROCEDURE — 3008F PR BODY MASS INDEX (BMI) DOCUMENTED: ICD-10-PCS | Mod: CPTII,S$GLB,, | Performed by: NURSE PRACTITIONER

## 2023-02-03 PROCEDURE — 99999 PR PBB SHADOW E&M-EST. PATIENT-LVL V: ICD-10-PCS | Mod: PBBFAC,,, | Performed by: NURSE PRACTITIONER

## 2023-02-03 PROCEDURE — 3008F BODY MASS INDEX DOCD: CPT | Mod: CPTII,S$GLB,, | Performed by: NURSE PRACTITIONER

## 2023-02-03 PROCEDURE — 3075F PR MOST RECENT SYSTOLIC BLOOD PRESS GE 130-139MM HG: ICD-10-PCS | Mod: CPTII,S$GLB,, | Performed by: NURSE PRACTITIONER

## 2023-02-03 PROCEDURE — 1101F PT FALLS ASSESS-DOCD LE1/YR: CPT | Mod: CPTII,S$GLB,, | Performed by: NURSE PRACTITIONER

## 2023-02-03 PROCEDURE — 1159F MED LIST DOCD IN RCRD: CPT | Mod: CPTII,S$GLB,, | Performed by: NURSE PRACTITIONER

## 2023-02-03 PROCEDURE — 3288F FALL RISK ASSESSMENT DOCD: CPT | Mod: CPTII,S$GLB,, | Performed by: NURSE PRACTITIONER

## 2023-02-03 PROCEDURE — 1126F PR PAIN SEVERITY QUANTIFIED, NO PAIN PRESENT: ICD-10-PCS | Mod: CPTII,S$GLB,, | Performed by: NURSE PRACTITIONER

## 2023-02-03 PROCEDURE — 1101F PR PT FALLS ASSESS DOC 0-1 FALLS W/OUT INJ PAST YR: ICD-10-PCS | Mod: CPTII,S$GLB,, | Performed by: NURSE PRACTITIONER

## 2023-02-03 NOTE — ASSESSMENT & PLAN NOTE
Diabetes Medications             OZEMPIC 0.25 mg or 0.5 mg(2 mg/1.5 mL) pen injector Inject 0.5 mg into the skin every 7 days.        -condition is currently controlled   -he plans to start watching his diet and plans to get a    -see diabetic health maintenance listed below  -on statin: No  -on ACE-I/ARB: No  -counseling provided on importance of diabetic diet and medication compliance in order to treat diabetes  -discussed diabetes disease course and potential complications  Lab Results   Component Value Date    HGBA1C 5.5 07/28/2022

## 2023-02-03 NOTE — PROGRESS NOTES
Assessment/Plan:  Problem List Items Addressed This Visit          Derm    Screening for skin cancer    Overview     Requesting referral to dermatology for routine skin check. Reports having some lesions removed with Dr. Bangura and Dr. Escalante in the past ([id number]).          Relevant Orders    Ambulatory referral/consult to Dermatology       Cardiac/Vascular    Elevated blood-pressure reading without diagnosis of hypertension - Primary    Overview     Jan 2023: No prior diagnosis of hypertension and the blood pressure has been high on recent checks (178/88). Not currently on any antihypertensives. He did test positive for covid x2 days ago. The patient has had no vision changes, chest pain, shortness of breath, palpitations.      Feb 2023: BP at goal today (138/84). Not currently on anti-hypertensive.   -continue lifestyle modification with low sodium diet and exercise   -discussed hypertension disease course and importance of treating high blood pressure  -patient understood and advised of risk of untreated blood pressure.  ER precautions were given for symptoms of hypertensive urgency and emergency.         Risk for coronary artery disease greater than 20% in next 10 years per [place] score    Overview     The 10-year ASCVD risk score (Jesus DK, et al., 2019) is: 20.7%    Values used to calculate the score:      Age: 67 years      Sex: Male      Is Non- : No      Diabetic: No      Tobacco smoker: No      Systolic Blood Pressure: 138 mmHg      Is BP treated: No      HDL Cholesterol: 43 mg/dL      Total Cholesterol: 232 mg/dL     Reviewed with patient. Discussed consider low-dose ASA and statin for primary prevention. Patient reports he would like to avoid starting any additional medications at this time. He states he has recently signed up for the gym and a . He plans to start working on diet and exercise.          Hyperlipidemia    Overview     -chronic condition.  Currently stable.    -not currently on a statin; reports would like to avoid starting medication. Plans to work on lifestyle modifications.   -recent labs listed below:  Lab Results   Component Value Date    CHOL 232 (H) 07/28/2022     Lab Results   Component Value Date    HDL 43 07/28/2022     Lab Results   Component Value Date    LDLCALC 161.8 (H) 07/28/2022     Lab Results   Component Value Date    TRIG 136 07/28/2022     Lab Results   Component Value Date    ALT 25 07/28/2022    AST 19 07/28/2022    ALKPHOS 74 07/28/2022    BILITOT 0.8 07/28/2022            Relevant Orders    US Carotid Bilateral       Endocrine    Insulin resistance    Overview     November 2022:  Patient tolerating Ozempic well.  However he has been off of it for a few weeks due to recent travel.    BMI Readings from Last 10 Encounters:   11/21/22 31.87 kg/m²   09/12/22 31.99 kg/m²   07/28/22 32.24 kg/m²   07/28/21 32.33 kg/m²   07/26/21 32.25 kg/m²   10/08/20 31.19 kg/m²   07/28/20 30.62 kg/m²   06/25/20 29.90 kg/m²   10/01/19 29.97 kg/m²   08/12/19 29.60 kg/m²       Patient denies any history of thyroid cancer, pancreatitis, MEN syndrome.   Management Status    Statin: Not taking  ACE/ARB: Not taking    Screening or Prevention Patient's value Goal Complete/Controlled?   HgA1C Testing and Control   Lab Results   Component Value Date    HGBA1C 5.5 07/28/2022      Annually/Less than 8% Yes   Lipid profile : 07/28/2022 Annually Yes   LDL control Lab Results   Component Value Date    LDLCALC 161.8 (H) 07/28/2022    Annually/Less than 100 mg/dl  No   Nephropathy screening No results found for: LABMICR  Lab Results   Component Value Date    PROTEINUA 3+ (A) 06/27/2018     No results found for: UTPCR   Annually No   Blood pressure BP Readings from Last 1 Encounters:   11/21/22 134/88    Less than 140/90 Yes   Dilated retinal exam Most Recent Eye Exam Date: Not Found Annually No   Foot exam   Most Recent Foot Exam Date: Not Found Annually No             Current Assessment & Plan     Diabetes Medications               OZEMPIC 0.25 mg or 0.5 mg(2 mg/1.5 mL) pen injector Inject 0.5 mg into the skin every 7 days.   -condition is currently controlled   -he plans to start watching his diet and plans to get a    -see diabetic health maintenance listed below  -on statin: No  -on ACE-I/ARB: No  -counseling provided on importance of diabetic diet and medication compliance in order to treat diabetes  -discussed diabetes disease course and potential complications  Lab Results   Component Value Date    HGBA1C 5.5 07/28/2022            BMI 32.0-32.9,adult    Overview     Encourage increased physical activity, healthy diet choices, and weight loss for prevention of progression of comorbid conditions. He states he has recently signed up for the gym and a . He plans to start working on diet and exercise.     Wt Readings from Last 3 Encounters:   02/03/23 0739 108 kg (238 lb 1.6 oz)   01/04/23 0949 108 kg (238 lb 1.6 oz)   11/21/22 1127 106.6 kg (235 lb)             Follow up in about 6 months (around 8/3/2023), or if symptoms worsen or fail to improve.  ER precautions for severe or worsening symptoms.     Hannah Anguiano NP  _____________________________________________________________________________________________________________________________________________________    CC: follow up     HPI: Patient is a 67-year-old male who presents in clinic today as an established patient here for follow up. He reports that he recently went hunting with a friend who starting having some neurological problems and visions changes. His friend ended up going to the hospital and had blockages in his carotid arteries. He would like further evaluation to make sure he has no underlying carotid artery problems. He is asymptomatic. Denies headaches, dizziness, visual disturbances, weakness, numbness, tingling.     Other chronic conditions have been reviewed and  remains stable. Further details as stated above.     HTN: The patient is currently being treated for essential hypertension. This condition is chronic and stable. The patient is tolerating their medication well with good compliance.  Denies any adverse effects of medications.  Counseling was offered regarding low sodium diet.  The patient has a reduced salt intake. Routine exercise recommended. The patient denies headache, vision changes, chest pain, palpitations, shortness of breath, or lower extremity edema.    Hyperlipidemia: This is a chronic problem. The current episode started more than 1 year ago. The problem is controlled. Recent lipid tests were reviewed and are variable. Pertinent negatives include no chest pain or shortness of breath. Current antihyperlipidemic treatment includes statins. The current treatment provides moderate improvement of lipids. There are no compliance problems.      Past Medical History:  Past Medical History:   Diagnosis Date    Allergy     Basal cell carcinoma     Cancer     Prostate    Colon polyp     Erosive gastritis 6/25/2020    Insulin resistance 7/28/2021     Past Surgical History:   Procedure Laterality Date    ADENOIDECTOMY  1966    Tonsils and adenoids removed    COLONOSCOPY N/A 12/1/2015    Procedure: COLONOSCOPY;  Surgeon: Rod Rayo MD;  Location: [facility];  Service: Endoscopy;  Laterality: N/A;    COLONOSCOPY N/A 6/25/2020    Procedure: COLONOSCOPY;  Surgeon: Rod Rayo MD;  Location: [facility];  Service: Endoscopy;  Laterality: N/A;    ESOPHAGOGASTRODUODENOSCOPY N/A 6/25/2020    Procedure: ESOPHAGOGASTRODUODENOSCOPY (EGD);  Surgeon: Rod Rayo MD;  Location: [facility];  Service: Endoscopy;  Laterality: N/A;    PROSTATE SURGERY  2008    radical prostatectomy    TONSILLECTOMY      Child    VASECTOMY       Review of patient's allergies indicates:   Allergen Reactions    Penicillins      Other reaction(s): Swelling    Sulfa (sulfonamide antibiotics)       Other reaction(s): Rash    Bactrim  [sulfamethoxazole-trimethoprim] Rash     Social History     Tobacco Use    Smoking status: Former     Packs/day: 0.25     Years: 5.00     Pack years: 1.25     Types: Cigarettes, Cigars     Start date: 1977     Quit date: 1982     Years since quittin.2    Smokeless tobacco: Never    Tobacco comments:     Still crave one at times.  Makes me sick for days when I have one   Substance Use Topics    Alcohol use: Yes     Alcohol/week: 6.0 standard drinks     Types: 6 Drinks containing 0.5 oz of alcohol per week     Comment: one a day    Drug use: No     Family History   Problem Relation Age of Onset    Cancer Mother         Ovarian Cancer-     Current Outpatient Medications on File Prior to Visit   Medication Sig Dispense Refill    hydrOXYzine HCL (ATARAX) 25 MG tablet Take 1/2 to 1 tablet for itching as needed. **MAY CAUSE DROWSINESS** 60 tablet 11    ketoconazole (NIZORAL) 2 % shampoo       montelukast (SINGULAIR) 10 mg tablet TAKE 1 TABLET BY MOUTH EVERY EVENING 90 tablet 4    OZEMPIC 0.25 mg or 0.5 mg(2 mg/1.5 mL) pen injector Inject 0.5 mg into the skin every 7 days. 3 pen 4    dexbrompheniramn-chlophedianol (CHLO HIST) 1-12.5 mg/5 mL Soln Take 10 mLs by mouth.      [DISCONTINUED] doxycycline (MONODOX) 100 MG capsule Take 100 mg by mouth 2 (two) times daily.      [DISCONTINUED] famotidine (PEPCID) 20 MG tablet Take 1 tablet (20 mg total) by mouth nightly as needed for Heartburn. (Patient not taking: Reported on 2/3/2023) 90 tablet 4    [DISCONTINUED] fluocinolone (SYNALAR) 0.01 % Sham Apply topically once daily at 6am. (Patient not taking: Reported on 2022) 120 mL 3    [DISCONTINUED] fluticasone propionate (FLONASE) 50 mcg/actuation nasal spray Use 2 puffs in each nostril q day. (Patient not taking: Reported on 2/3/2023) 16 g 12     No current facility-administered medications on file prior to visit.     Review of Systems   Constitutional:  Negative for  appetite change, chills, fatigue and fever.   HENT:  Negative for congestion, rhinorrhea and sore throat.    Eyes:  Negative for visual disturbance.   Respiratory:  Negative for cough and shortness of breath.    Cardiovascular:  Negative for chest pain, palpitations and leg swelling.   Gastrointestinal:  Negative for abdominal pain, diarrhea and vomiting.   Genitourinary:  Negative for difficulty urinating, dysuria and hematuria.   Musculoskeletal:  Negative for arthralgias and myalgias.   Skin:  Negative for rash and wound.   Neurological:  Negative for dizziness, syncope, weakness, light-headedness and headaches.   Psychiatric/Behavioral:  Negative for behavioral problems. The patient is not nervous/anxious.      Vitals:    02/03/23 0739   BP: 138/84   BP Location: Right arm   Pulse: 97   SpO2: 98%   Weight: 108 kg (238 lb 1.6 oz)   Height: 6' (1.829 m)     Wt Readings from Last 3 Encounters:   02/03/23 108 kg (238 lb 1.6 oz)   01/04/23 108 kg (238 lb 1.6 oz)   11/21/22 106.6 kg (235 lb)     Physical Exam  Vitals reviewed.   Constitutional:       General: He is not in acute distress.     Appearance: Normal appearance. He is not ill-appearing.   HENT:      Head: Normocephalic and atraumatic.      Right Ear: External ear normal.      Left Ear: External ear normal.      Nose: Nose normal.   Eyes:      Extraocular Movements: Extraocular movements intact.      Conjunctiva/sclera: Conjunctivae normal.   Neck:      Vascular: No carotid bruit.   Cardiovascular:      Rate and Rhythm: Normal rate.      Pulses: Normal pulses.      Heart sounds: Normal heart sounds. No murmur heard.  Pulmonary:      Effort: Pulmonary effort is normal. No respiratory distress.      Breath sounds: Normal breath sounds.   Abdominal:      General: Bowel sounds are normal. There is no distension.      Palpations: Abdomen is soft.      Tenderness: There is no abdominal tenderness.   Musculoskeletal:         General: Normal range of motion.       Cervical back: Normal range of motion and neck supple.   Lymphadenopathy:      Cervical: No cervical adenopathy.   Skin:     General: Skin is warm and dry.      Capillary Refill: Capillary refill takes less than 2 seconds.      Coloration: Skin is not pale.      Findings: No rash.   Neurological:      General: No focal deficit present.      Mental Status: He is alert and oriented to person, place, and time. Mental status is at baseline.   Psychiatric:         Mood and Affect: Mood normal.         Speech: Speech normal.         Behavior: Behavior normal. Behavior is cooperative.         Thought Content: Thought content normal.         Judgment: Judgment normal.     Health Maintenance   Topic Date Due    Abdominal Aortic Aneurysm Screening  Never done    Lipid Panel  07/28/2023    TETANUS VACCINE  11/25/2025    Hepatitis C Screening  Completed

## 2023-02-06 ENCOUNTER — PATIENT MESSAGE (OUTPATIENT)
Dept: FAMILY MEDICINE | Facility: CLINIC | Age: 68
End: 2023-02-06
Payer: COMMERCIAL

## 2023-02-06 DIAGNOSIS — R03.0 ELEVATED BLOOD-PRESSURE READING WITHOUT DIAGNOSIS OF HYPERTENSION: Primary | ICD-10-CM

## 2023-02-07 NOTE — TELEPHONE ENCOUNTER
I have signed for the following orders AND/OR meds.  Please call the patient and ask the patient to schedule the testing AND/OR inform about any medications that were sent.     Orders Placed This Encounter   Procedures    US Carotid Bilateral     Standing Status:   Future     Standing Expiration Date:   2/7/2024          [facility] - [place] - [address] [address]  Phone: [phone] Fax: [phone]

## 2023-02-10 ENCOUNTER — PATIENT MESSAGE (OUTPATIENT)
Dept: AUDIOLOGY | Facility: CLINIC | Age: 68
End: 2023-02-10
Payer: COMMERCIAL

## 2023-02-15 ENCOUNTER — PATIENT MESSAGE (OUTPATIENT)
Dept: RESEARCH | Facility: HOSPITAL | Age: 68
End: 2023-02-15
Payer: COMMERCIAL

## 2023-02-24 ENCOUNTER — PATIENT MESSAGE (OUTPATIENT)
Dept: RESEARCH | Facility: HOSPITAL | Age: 68
End: 2023-02-24
Payer: COMMERCIAL

## 2023-03-22 ENCOUNTER — TELEPHONE (OUTPATIENT)
Dept: FAMILY MEDICINE | Facility: CLINIC | Age: 68
End: 2023-03-22
Payer: COMMERCIAL

## 2023-03-22 ENCOUNTER — PATIENT MESSAGE (OUTPATIENT)
Dept: FAMILY MEDICINE | Facility: CLINIC | Age: 68
End: 2023-03-22
Payer: COMMERCIAL

## 2023-03-22 NOTE — TELEPHONE ENCOUNTER
----- Message from Carlee Tellez sent at 3/22/2023 11:46 AM CDT -----  Contact: Isaac Burris is calling in regards to him not understanding what you meant.please call back [phone]        Thanks  KATTY

## 2023-03-31 ENCOUNTER — PATIENT MESSAGE (OUTPATIENT)
Dept: AUDIOLOGY | Facility: CLINIC | Age: 68
End: 2023-03-31
Payer: COMMERCIAL

## 2023-04-04 RX ORDER — HYDROXYZINE HYDROCHLORIDE 25 MG/1
TABLET, FILM COATED ORAL
Qty: 60 TABLET | Refills: 11 | Status: SHIPPED | OUTPATIENT
Start: 2023-04-04

## 2023-04-14 ENCOUNTER — PATIENT MESSAGE (OUTPATIENT)
Dept: FAMILY MEDICINE | Facility: CLINIC | Age: 68
End: 2023-04-14
Payer: COMMERCIAL

## 2023-04-14 ENCOUNTER — PATIENT MESSAGE (OUTPATIENT)
Dept: AUDIOLOGY | Facility: CLINIC | Age: 68
End: 2023-04-14
Payer: COMMERCIAL

## 2023-04-24 ENCOUNTER — PATIENT MESSAGE (OUTPATIENT)
Dept: FAMILY MEDICINE | Facility: CLINIC | Age: 68
End: 2023-04-24
Payer: COMMERCIAL

## 2023-05-07 RX ORDER — AZITHROMYCIN 250 MG/1
TABLET, FILM COATED ORAL
Qty: 6 TABLET | Refills: 0 | Status: SHIPPED | OUTPATIENT
Start: 2023-05-07 | End: 2023-05-12

## 2023-05-10 ENCOUNTER — TELEPHONE (OUTPATIENT)
Dept: FAMILY MEDICINE | Facility: CLINIC | Age: 68
End: 2023-05-10
Payer: COMMERCIAL

## 2023-05-24 ENCOUNTER — PATIENT MESSAGE (OUTPATIENT)
Dept: FAMILY MEDICINE | Facility: CLINIC | Age: 68
End: 2023-05-24
Payer: COMMERCIAL

## 2023-05-24 DIAGNOSIS — E88.819 INSULIN RESISTANCE: ICD-10-CM

## 2023-07-06 ENCOUNTER — PATIENT MESSAGE (OUTPATIENT)
Dept: FAMILY MEDICINE | Facility: CLINIC | Age: 68
End: 2023-07-06
Payer: COMMERCIAL

## 2023-07-10 ENCOUNTER — PATIENT MESSAGE (OUTPATIENT)
Dept: AUDIOLOGY | Facility: CLINIC | Age: 68
End: 2023-07-10
Payer: COMMERCIAL

## 2023-07-11 ENCOUNTER — OFFICE VISIT (OUTPATIENT)
Dept: PODIATRY | Facility: CLINIC | Age: 68
End: 2023-07-11
Payer: COMMERCIAL

## 2023-07-11 DIAGNOSIS — M72.2 PLANTAR FASCIITIS: Primary | ICD-10-CM

## 2023-07-11 PROCEDURE — 99999 PR PBB SHADOW E&M-EST. PATIENT-LVL III: ICD-10-PCS | Mod: PBBFAC,,, | Performed by: STUDENT IN AN ORGANIZED HEALTH CARE EDUCATION/TRAINING PROGRAM

## 2023-07-11 PROCEDURE — 3288F PR FALLS RISK ASSESSMENT DOCUMENTED: ICD-10-PCS | Mod: CPTII,S$GLB,, | Performed by: STUDENT IN AN ORGANIZED HEALTH CARE EDUCATION/TRAINING PROGRAM

## 2023-07-11 PROCEDURE — 1160F PR REVIEW ALL MEDS BY PRESCRIBER/CLIN PHARMACIST DOCUMENTED: ICD-10-PCS | Mod: CPTII,S$GLB,, | Performed by: STUDENT IN AN ORGANIZED HEALTH CARE EDUCATION/TRAINING PROGRAM

## 2023-07-11 PROCEDURE — 1159F PR MEDICATION LIST DOCUMENTED IN MEDICAL RECORD: ICD-10-PCS | Mod: CPTII,S$GLB,, | Performed by: STUDENT IN AN ORGANIZED HEALTH CARE EDUCATION/TRAINING PROGRAM

## 2023-07-11 PROCEDURE — 99203 OFFICE O/P NEW LOW 30 MIN: CPT | Mod: S$GLB,,, | Performed by: STUDENT IN AN ORGANIZED HEALTH CARE EDUCATION/TRAINING PROGRAM

## 2023-07-11 PROCEDURE — 99999 PR PBB SHADOW E&M-EST. PATIENT-LVL III: CPT | Mod: PBBFAC,,, | Performed by: STUDENT IN AN ORGANIZED HEALTH CARE EDUCATION/TRAINING PROGRAM

## 2023-07-11 PROCEDURE — 99203 PR OFFICE/OUTPT VISIT, NEW, LEVL III, 30-44 MIN: ICD-10-PCS | Mod: S$GLB,,, | Performed by: STUDENT IN AN ORGANIZED HEALTH CARE EDUCATION/TRAINING PROGRAM

## 2023-07-11 PROCEDURE — 1159F MED LIST DOCD IN RCRD: CPT | Mod: CPTII,S$GLB,, | Performed by: STUDENT IN AN ORGANIZED HEALTH CARE EDUCATION/TRAINING PROGRAM

## 2023-07-11 PROCEDURE — 1101F PR PT FALLS ASSESS DOC 0-1 FALLS W/OUT INJ PAST YR: ICD-10-PCS | Mod: CPTII,S$GLB,, | Performed by: STUDENT IN AN ORGANIZED HEALTH CARE EDUCATION/TRAINING PROGRAM

## 2023-07-11 PROCEDURE — 1125F AMNT PAIN NOTED PAIN PRSNT: CPT | Mod: CPTII,S$GLB,, | Performed by: STUDENT IN AN ORGANIZED HEALTH CARE EDUCATION/TRAINING PROGRAM

## 2023-07-11 PROCEDURE — 1160F RVW MEDS BY RX/DR IN RCRD: CPT | Mod: CPTII,S$GLB,, | Performed by: STUDENT IN AN ORGANIZED HEALTH CARE EDUCATION/TRAINING PROGRAM

## 2023-07-11 PROCEDURE — 1101F PT FALLS ASSESS-DOCD LE1/YR: CPT | Mod: CPTII,S$GLB,, | Performed by: STUDENT IN AN ORGANIZED HEALTH CARE EDUCATION/TRAINING PROGRAM

## 2023-07-11 PROCEDURE — 3288F FALL RISK ASSESSMENT DOCD: CPT | Mod: CPTII,S$GLB,, | Performed by: STUDENT IN AN ORGANIZED HEALTH CARE EDUCATION/TRAINING PROGRAM

## 2023-07-11 PROCEDURE — 1125F PR PAIN SEVERITY QUANTIFIED, PAIN PRESENT: ICD-10-PCS | Mod: CPTII,S$GLB,, | Performed by: STUDENT IN AN ORGANIZED HEALTH CARE EDUCATION/TRAINING PROGRAM

## 2023-07-11 NOTE — PROGRESS NOTES
Chief Complaint   Patient presents with    Heel Pain         MEDICAL DECISION MAKING         1. Plantar fasciitis       I counseled the patient on the patient's conditions, their implications and medical management.     Plantar Fasciitis:  -Patient was given a printout of stretching exercises to stretch the achilles tendon and increase ankle dorsiflexion  -Patient instructed to ice with a frozen water bottle as instructed in the handout.  -Patient instructed to refrain from barefoot walking. I recommend Hoka slippers.  -Shoe gear was discussed with patient and recommended a supportive shoe with a stiff shank that doesn't flex at the arch. The shoe should also have an elevated heel. Some brands include Kapoor, [employer]ics and Hokas. The length of the shoe should accommodate the width of a thumb between the big toe and the edge of the shoe.  -Physical therapy  -f/u in early September for possible steroid injection    Miguel Delgado DPM         HPI:       Blayne Mendoza is a 67 y.o. male who presents to clinic with concerns of b/l heel pain for weeks.     Pain is worse with weight bearing and first few steps after prolonged periods of rest.     Patient denies acute trauma to the affected area.   Treatment tried: new shoes  The left foot hurts more than the right.    Patient Active Problem List   Diagnosis    Colon polyps    Erectile dysfunction following radical prostatectomy    Prostate cancer    Right shoulder pain    Encounter for long-term (current) use of medications    Rotator cuff syndrome, right    Insulin resistance    Hearing loss    Dysfunction of both eustachian tubes    Elevated blood-pressure reading without diagnosis of hypertension    Risk for coronary artery disease greater than 20% in next 10 years per [place] score    BMI 32.0-32.9,adult    Screening for skin cancer    Hyperlipidemia         Current Outpatient Medications on File Prior to Visit   Medication Sig Dispense Refill     dexbrompheniramn-chlophedianol (CHLO HIST) 1-12.5 mg/5 mL Soln Take 10 mLs by mouth.      hydrOXYzine HCL (ATARAX) 25 MG tablet Take 1/2 to 1 tablet for itching as needed. **MAY CAUSE DROWSINESS** 60 tablet 11    ketoconazole (NIZORAL) 2 % shampoo       montelukast (SINGULAIR) 10 mg tablet TAKE 1 TABLET BY MOUTH EVERY EVENING 90 tablet 4    OZEMPIC 0.25 mg or 0.5 mg(2 mg/1.5 mL) pen injector Inject 0.5 mg into the skin every 7 days. 3 pen 4     No current facility-administered medications on file prior to visit.           Review of patient's allergies indicates:   Allergen Reactions    Penicillins      Other reaction(s): Swelling    Sulfa (sulfonamide antibiotics)      Other reaction(s): Rash    Bactrim  [sulfamethoxazole-trimethoprim] Rash         ROS:  General ROS: negative for  chills, fatigue or fever  Cardiovascular ROS: no chest pain or dyspnea on exertion  Musculoskeletal ROS: negative for joint pain or joint stiffness.  Negative for loss of strength.  Positive for foot pain.   Neuro ROS: Negative for syncope, numbness, or muscle weakness  Skin ROS: Negative for rash, itching or nail/hair changes.           OBJECTIVE:         There were no vitals filed for this visit.     Bilateral Lower extremity exam:  Vasc:   Palpable pedal pulses.   Feet appropriately warm to touch.   Cap refill time is within normal limits   Edema: n/a    Neurological:    Light touch, proprioception, and Sharp/dull sensation are all intact.   There is no Tinel's along the tarsal tunnel.      Derm:   No open lesions, macerations, or rashes  Bruising:  absent  Redness:  absent  Pedal hair:  present      MSK:    Palpable pain plantar medial tubercle of the calcaneus left,    tightness to the Achilles tendon with ROM bilateral   There is no pain with the heel squeeze/compression  test.

## 2023-07-12 ENCOUNTER — DOCUMENTATION ONLY (OUTPATIENT)
Dept: AUDIOLOGY | Facility: CLINIC | Age: 68
End: 2023-07-12
Payer: COMMERCIAL

## 2023-07-12 NOTE — PROGRESS NOTES
Patient requested on 07/10/2023 hearing aid domes and wax filters be mailed to him. I put them in the mail.

## 2023-07-24 ENCOUNTER — CLINICAL SUPPORT (OUTPATIENT)
Dept: REHABILITATION | Facility: HOSPITAL | Age: 68
End: 2023-07-24
Attending: STUDENT IN AN ORGANIZED HEALTH CARE EDUCATION/TRAINING PROGRAM
Payer: COMMERCIAL

## 2023-07-24 DIAGNOSIS — M25.672 DECREASED RANGE OF MOTION OF BOTH ANKLES: ICD-10-CM

## 2023-07-24 DIAGNOSIS — M72.2 PLANTAR FASCIITIS: ICD-10-CM

## 2023-07-24 DIAGNOSIS — M25.671 DECREASED RANGE OF MOTION OF BOTH ANKLES: ICD-10-CM

## 2023-07-24 DIAGNOSIS — R68.89 DECREASED ACTIVITY TOLERANCE: ICD-10-CM

## 2023-07-24 PROCEDURE — 97161 PT EVAL LOW COMPLEX 20 MIN: CPT | Mod: PN

## 2023-07-24 PROCEDURE — 97110 THERAPEUTIC EXERCISES: CPT | Mod: PN

## 2023-07-25 NOTE — PLAN OF CARE
"[facility]   Physical Therapy Initial Evaluation      Date: 7/24/2023   Name: Blayne Mendoza  Clinic Number: [id number]    Therapy Diagnosis:   Encounter Diagnoses   Name Primary?    Plantar fasciitis     Decreased range of motion of both ankles     Decreased activity tolerance      Physician: Miguel Delgado DPM    Physician Orders: PT Eval and Treat  Medical Diagnosis from Referral: Plantar fasciitis [M72.2]  Evaluation Date: 7/24/2023  Authorization Period Expiration: 8/24/2023  Plan of Care Expiration: 9/18/2023  Progress Note Due: 8/24/2023  Visit # / Visits authorized: 1/ 1   FOTO: 1/3    Precautions: Standard, hx of cancer    Time In: 0830 am  Time Out: 0920 am  Total Appointment Time (timed & untimed codes): 50 minutes    SUBJECTIVE     Date of onset: about 6 months ago    History of current condition - Isaac reports: symptoms in both feet that began about 6 months ago. 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 No clear mechanism of injury 6 months ago, but he began to feel tingling in both feet while in recliner at night. These symptoms are causing limited and altered sleep. 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 He has several trips planned later this year such as hunting trips, a trip to [place], and a trip to [facility]. He is hoping that these symptoms are improved by that time.     Falls: none    Imaging, NA    Prior Therapy: long ago  Social History: lives with their family  Occupation: insurance  Prior Level of Function: ind  Current Level of Function: pain at night,     Pain:  Current 0/10, worst 10/10, best 0/10   Location: both feet, left side worse  Description: Aching, Burning, Sharp, and constant  Aggravating Factors: activities, unsure   Easing Factors: rest    Patients goals: prepare for trip, "get moving again"     Medical History:   Past Medical History: "   Diagnosis Date    Allergy     Basal cell carcinoma     Cancer     Prostate    Colon polyp     Erosive gastritis 6/25/2020    Insulin resistance 7/28/2021     Surgical History:   Blayne Mendoza  has a past surgical history that includes Prostate surgery (2008); Vasectomy; Colonoscopy (N/A, 12/1/2015); Esophagogastroduodenoscopy (N/A, 6/25/2020); Colonoscopy (N/A, 6/25/2020); Tonsillectomy; and Adenoidectomy (1966).    Medications:   Blayne has a current medication list which includes the following prescription(s): dexbrompheniramn-chlophedianol, hydroxyzine hcl, ketoconazole, montelukast, and ozempic.    Allergies:   Review of patient's allergies indicates:   Allergen Reactions    Penicillins      Other reaction(s): Swelling    Sulfa (sulfonamide antibiotics)      Other reaction(s): Rash    Bactrim  [sulfamethoxazole-trimethoprim] Rash      OBJECTIVE     Limitation/Restriction for FOTO Foot Survey    Therapist reviewed FOTO scores for Blayne Mendoza on 7/24/2023.   FOTO documents entered into EPIC - see Media section.    Functional Score: 55       Gait/Balance: gait appears WNL, increased discomfort in left heel when in single leg stance    SL balance: greater than 10 sec with upper extremity support    Squat: Not assessed today    Structure/observation: WNL       Dermatome/Reflexes: Sensation 2+ to light touch B LE's.  Patellar and Achilles reflexes WNL.    Lower Extremity Strength   Right LE  Left LE   Knee Flexion 5/5 Knee Flexion 5/5   Knee Extension 5/5 Knee Extension 5/5   Ankle dorsiflexion: 5/5 Ankle dorsiflexion: 5/5   Ankle plantarflexion: 5/5 Ankle plantarflexion: 5/5   Ankle inversion 5/5 Ankle inversion 5/5   Ankle eversion 5/5 Ankle eversion 5/5      Ankle PROM Right  Left   Plantarflexion 40 40   Dorsiflexion 20 18   Inversion 30 25   Eversion 20 20     Special Test Right Left   Anterior Drawer - -   Talar Tilt - ROS   Kleiger Test - -   Vanessa - -   Windlass - +   Catron Fx Rule - -     Knee to  wall test: RLE 6 in, LLE 3.75 in    Joint mobility:  more limited in left ankle compared to right, calcaneal/talocrural     Lower Limb Tension Test:  no ROS    Palpation:  moderate TTP with ROS to left medial heel and plantar aspect of left foot    TREATMENT     Total Treatment time (time-based codes) separate from Evaluation: (25) minutes      Isaac received the treatments listed below:      therapeutic exercises to develop strength, endurance, ROM, flexibility, posture, and core stabilization for (25) minutes including:  HEP review and patient education  See patient instructions for attached HEP    PATIENT EDUCATION AND HOME EXERCISES     Education provided:   - HEP provided and reviewed  - Anatomy and Physiology pertaining to current condition  - Possible response to exercise  - Importance of PT compliance    Written Home Exercises Provided: yes. Exercises were reviewed and Isaac was able to demonstrate them prior to the end of the session.  Isaac demonstrated good  understanding of the education provided. See EMR under Patient Instructions for exercises provided during therapy sessions.    ASSESSMENT     Blayne is a 67 y.o. male referred to outpatient Physical Therapy with a medical diagnosis of Plantar fasciitis [M72.2]. Patient presents with limited left ankle ROM, limited mobility in left ankle, decreased flexibility in bilateral LE, possible soft tissue dysfunction, and decreased activity tolerance. Spoke with PT about multiple treatment options including stretching, exercise, dry needling, and joint mobilizations. HEP was provided to improve outcome measures, he demonstrates understanding. Pt would benefit from continued skilled PT to address current symptoms and functional limitations.     Patient prognosis is Good.   Patient will benefit from skilled outpatient Physical Therapy to address the deficits stated above and in the chart below, provide patient /family education, and to maximize patientt's level of  independence.     Plan of care discussed with patient: Yes  Patient's spiritual, cultural and educational needs considered and patient is agreeable to the plan of care and goals as stated below:     Anticipated Barriers for therapy: none    Medical Necessity is demonstrated by the following  History  Co-morbidities and personal factors that may impact the plan of care [] LOW: no personal factors / co-morbidities  [x] MODERATE: 1-2 personal factors / co-morbidities  [] HIGH: 3+ personal factors / co-morbidities    Moderate / High Support Documentation: co-morbidities, PMH     Examination  Body Structures and Functions, activity limitations and participation restrictions that may impact the plan of care [x] LOW: addressing 1-2 elements  [] MODERATE: 3+ elements  [] HIGH: 4+ elements (please support below)    Moderate / High Support Documentation:      Clinical Presentation [] LOW: stable  [x] MODERATE: Evolving  [] HIGH: Unstable     Decision Making/ Complexity Score: low       Goals:  Short Term Goals:  4 weeks  1.Pt to be educated on HEP.  2.Patient to demo increased knee to wall test mobility by 1 inch.  3.Patient to have decreased pain to less than 5/10 to improve quality of movement.  4.Patient to increase SL bal to greater than 10 sec without discomfort.  5.Patient to improve score on the FOTO by 5.    Long Term Goals: 8 weeks  1. Patient to perform daily activities including exercise, recreational activities, yard work without limitation.  2. Patient to demonstrate increased left ankle ROM and mobility without pain to improve functional mobility.  3. Patient to have decreased pain to 2/10 with light activities to improve quality of movement.  4. Patient to demonstrate improved lower extremity flexibility to reduce onset of symptoms.    PLAN     Plan of care Certification: 7/24/2023 to 9/13/2023.    Outpatient Physical Therapy 2 times weekly for 8 weeks to include the following interventions: Electrical  Stimulation IFC/TENS, Gait Training, Manual Therapy, Moist Heat/ Ice, Neuromuscular Re-ed, Patient Education, Self Care, Therapeutic Activities, Therapeutic Exercise, and Dry needling.     Vu Lyn, PT DPT      I CERTIFY THE NEED FOR THESE SERVICES FURNISHED UNDER THIS PLAN OF TREATMENT AND WHILE UNDER MY CARE   Physician's comments:     Physician's Signature: ___________________________________________________

## 2023-07-28 ENCOUNTER — OFFICE VISIT (OUTPATIENT)
Dept: FAMILY MEDICINE | Facility: CLINIC | Age: 68
End: 2023-07-28
Payer: COMMERCIAL

## 2023-07-28 ENCOUNTER — HOSPITAL ENCOUNTER (OUTPATIENT)
Dept: RADIOLOGY | Facility: HOSPITAL | Age: 68
Discharge: HOME | End: 2023-07-28
Attending: FAMILY MEDICINE
Payer: COMMERCIAL

## 2023-07-28 VITALS
DIASTOLIC BLOOD PRESSURE: 82 MMHG | BODY MASS INDEX: 31.98 KG/M2 | OXYGEN SATURATION: 98 % | HEART RATE: 81 BPM | WEIGHT: 236.13 LBS | HEIGHT: 72 IN | SYSTOLIC BLOOD PRESSURE: 138 MMHG

## 2023-07-28 DIAGNOSIS — Z79.899 ENCOUNTER FOR LONG-TERM (CURRENT) USE OF MEDICATIONS: ICD-10-CM

## 2023-07-28 DIAGNOSIS — E88.819 INSULIN RESISTANCE: ICD-10-CM

## 2023-07-28 DIAGNOSIS — Z00.00 WELL ADULT EXAM: Primary | ICD-10-CM

## 2023-07-28 DIAGNOSIS — C61 PROSTATE CANCER: ICD-10-CM

## 2023-07-28 DIAGNOSIS — M79.644 PAIN OF FINGER OF RIGHT HAND: ICD-10-CM

## 2023-07-28 DIAGNOSIS — R09.89 CAROTID BRUIT, UNSPECIFIED LATERALITY: ICD-10-CM

## 2023-07-28 DIAGNOSIS — Z13.6 SCREENING FOR AAA (ABDOMINAL AORTIC ANEURYSM): ICD-10-CM

## 2023-07-28 DIAGNOSIS — Z13.6 SCREENING FOR CARDIOVASCULAR CONDITION: ICD-10-CM

## 2023-07-28 DIAGNOSIS — Z13.6 ENCOUNTER FOR LIPID SCREENING FOR CARDIOVASCULAR DISEASE: ICD-10-CM

## 2023-07-28 DIAGNOSIS — Z13.220 ENCOUNTER FOR LIPID SCREENING FOR CARDIOVASCULAR DISEASE: ICD-10-CM

## 2023-07-28 DIAGNOSIS — R94.31 ABNORMAL EKG: ICD-10-CM

## 2023-07-28 PROCEDURE — 1159F PR MEDICATION LIST DOCUMENTED IN MEDICAL RECORD: ICD-10-PCS | Mod: CPTII,S$GLB,, | Performed by: FAMILY MEDICINE

## 2023-07-28 PROCEDURE — 99999 PR PBB SHADOW E&M-EST. PATIENT-LVL V: CPT | Mod: PBBFAC,,, | Performed by: FAMILY MEDICINE

## 2023-07-28 PROCEDURE — 1101F PR PT FALLS ASSESS DOC 0-1 FALLS W/OUT INJ PAST YR: ICD-10-PCS | Mod: CPTII,S$GLB,, | Performed by: FAMILY MEDICINE

## 2023-07-28 PROCEDURE — 1159F MED LIST DOCD IN RCRD: CPT | Mod: CPTII,S$GLB,, | Performed by: FAMILY MEDICINE

## 2023-07-28 PROCEDURE — 73130 X-RAY EXAM OF HAND: CPT | Mod: TC,PO,RT

## 2023-07-28 PROCEDURE — 73130 XR HAND COMPLETE 3 VIEW RIGHT: ICD-10-PCS | Mod: 26,RT,, | Performed by: RADIOLOGY

## 2023-07-28 PROCEDURE — 99999 PR PBB SHADOW E&M-EST. PATIENT-LVL V: ICD-10-PCS | Mod: PBBFAC,,, | Performed by: FAMILY MEDICINE

## 2023-07-28 PROCEDURE — 3008F PR BODY MASS INDEX (BMI) DOCUMENTED: ICD-10-PCS | Mod: CPTII,S$GLB,, | Performed by: FAMILY MEDICINE

## 2023-07-28 PROCEDURE — 3288F PR FALLS RISK ASSESSMENT DOCUMENTED: ICD-10-PCS | Mod: CPTII,S$GLB,, | Performed by: FAMILY MEDICINE

## 2023-07-28 PROCEDURE — 1101F PT FALLS ASSESS-DOCD LE1/YR: CPT | Mod: CPTII,S$GLB,, | Performed by: FAMILY MEDICINE

## 2023-07-28 PROCEDURE — 3288F FALL RISK ASSESSMENT DOCD: CPT | Mod: CPTII,S$GLB,, | Performed by: FAMILY MEDICINE

## 2023-07-28 PROCEDURE — 99397 PER PM REEVAL EST PAT 65+ YR: CPT | Mod: S$GLB,,, | Performed by: FAMILY MEDICINE

## 2023-07-28 PROCEDURE — 3079F DIAST BP 80-89 MM HG: CPT | Mod: CPTII,S$GLB,, | Performed by: FAMILY MEDICINE

## 2023-07-28 PROCEDURE — 3079F PR MOST RECENT DIASTOLIC BLOOD PRESSURE 80-89 MM HG: ICD-10-PCS | Mod: CPTII,S$GLB,, | Performed by: FAMILY MEDICINE

## 2023-07-28 PROCEDURE — 3075F PR MOST RECENT SYSTOLIC BLOOD PRESS GE 130-139MM HG: ICD-10-PCS | Mod: CPTII,S$GLB,, | Performed by: FAMILY MEDICINE

## 2023-07-28 PROCEDURE — 3008F BODY MASS INDEX DOCD: CPT | Mod: CPTII,S$GLB,, | Performed by: FAMILY MEDICINE

## 2023-07-28 PROCEDURE — 1160F RVW MEDS BY RX/DR IN RCRD: CPT | Mod: CPTII,S$GLB,, | Performed by: FAMILY MEDICINE

## 2023-07-28 PROCEDURE — 1126F AMNT PAIN NOTED NONE PRSNT: CPT | Mod: CPTII,S$GLB,, | Performed by: FAMILY MEDICINE

## 2023-07-28 PROCEDURE — 99397 PR PREVENTIVE VISIT,EST,65 & OVER: ICD-10-PCS | Mod: S$GLB,,, | Performed by: FAMILY MEDICINE

## 2023-07-28 PROCEDURE — 1160F PR REVIEW ALL MEDS BY PRESCRIBER/CLIN PHARMACIST DOCUMENTED: ICD-10-PCS | Mod: CPTII,S$GLB,, | Performed by: FAMILY MEDICINE

## 2023-07-28 PROCEDURE — 73130 X-RAY EXAM OF HAND: CPT | Mod: 26,RT,, | Performed by: RADIOLOGY

## 2023-07-28 PROCEDURE — 3075F SYST BP GE 130 - 139MM HG: CPT | Mod: CPTII,S$GLB,, | Performed by: FAMILY MEDICINE

## 2023-07-28 PROCEDURE — 1126F PR PAIN SEVERITY QUANTIFIED, NO PAIN PRESENT: ICD-10-PCS | Mod: CPTII,S$GLB,, | Performed by: FAMILY MEDICINE

## 2023-07-28 NOTE — PATIENT INSTRUCTIONS
Follow up in about 1 year (around 7/28/2024), or if symptoms worsen or fail to improve, for Annual Wellness Exam.     Dear patient,   As a result of recent federal legislation (The Federal Cures Act), you may receive lab or pathology results from your visit in your [facility] account before your physician is able to contact you. Your physician or their representative will relay the results to you with their recommendations at their soonest availability.     If no improvement in symptoms or symptoms worsen, please be advised to call MD, follow-up at clinic and/or go to ER if becomes severe.    Dustin Reyes M.D.        We Offer TELEHEALTH & Same Day Appointments!   Book your Telehealth appointment with me through my nurse or   Clinic appointments on [facility] [facility]!    [address] [place] [address]    Office: [phone]   FAX: [phone]    Check out my Facebook Page and Follow Me at: [email or web address]    Check out my website at [employer] by clicking on: [email or web address]    To Schedule appointments online, go to [facility]: [email or web address]

## 2023-07-28 NOTE — PROGRESS NOTES
1st check to see if patient has seen the results.  If not then  CALL patient with results and Document verification.  Schedule follow-up if needed.  [phone]  X-ray of the right hand has been reviewed by myself and radiology.  There is no acute fracture dislocation or other abnormal finding in the area of pain however there is mild arthritis noted at the thumb joint.  Try the Voltaren gel as we discussed.  Follow-up sooner if no improvement.

## 2023-07-28 NOTE — PROGRESS NOTES
This note is specifically for wellness visit performed today.     WELLNESS EXAM      Patient ID: Blayne Mendoza is a 67 y.o. male.  has a past medical history of Allergy, Basal cell carcinoma, Cancer, Colon polyp, Erosive gastritis (6/25/2020), and Insulin resistance (7/28/2021).     Chief Complaint:  Encounter for wellness exam    Well Adult Physical: Patient here for a comprehensive physical exam.The patient reports chronic problems.  The patient's last visit with me was on 11/21/2022.    Patient has seen PodiatryAndrhannah Delgado DPM recently for plantar fasciitis.  He is currently in physical therapy for this.  Reports improvement.    July 2023: Patient doing well.  He has been off of Ozempic.  Insurance was giving him issues with getting the medication however it was actually approved until 2026 per insurance.  Patient did have issue with obtaining it from the local pharmacy but they eventually filled the medication for him.     Patient is concerned about carotid artery stenosis.  Patient had a friend who had a stroke from 90% blockage.  Patient has not had any recent screenings for cardiovascular health other than life insurance policy in the past.  He is due for updated EKG, ultrasound of the carotid and ultrasound of the abdominal aortic aneurysm screening.     Patient also having some right hand pain mainly in the 5th digit.  Patient denies any trauma or injury.    BMI Readings from Last 10 Encounters:   07/28/23 32.02 kg/m²   02/03/23 32.29 kg/m²   01/04/23 32.29 kg/m²   11/21/22 31.87 kg/m²   09/12/22 31.99 kg/m²   07/28/22 32.24 kg/m²   07/28/21 32.33 kg/m²   07/26/21 32.25 kg/m²   10/08/20 31.19 kg/m²   07/28/20 30.62 kg/m²           Reviewed Care team:Previous PCP: Dr. Christopher Méndez  Urology Dr. Jorgensen- prostate cancer removal       Latest Reference Range & Units 01/28/10 08:12 08/02/10 08:43 12/13/11 11:42 01/03/13 16:10 12/09/14 09:50 12/15/15 15:05 08/20/18 08:48 02/01/19 08:48 01/28/21 08:47  07/28/21 08:46   PSA, Screen 0.00 - 4.00 ng/mL 0.01 0.03 0.04 0.02 0.02 0.04    0.02   PSA Diagnostic 0.00 - 4.00 ng/mL       0.03  0.02    PSA Total 0.00 - 4.00 ng/mL        0.01 0.01    PSA, Free 0.01 - 1.50 ng/mL        0.01 0.01    PSA, Free % Not established %        100.00 100.00        July 2022:  It is been one year since our last visit.  Patient reports that he is feeling well.  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    Currently having the need for prophylactic treatment for bacterial infection.  Requesting refills.  He reports that he has been doing well on Ozempic.  He has not been taking the medication regularly however.  Weight has been stable.      Patient denies any history of thyroid cancer, pancreatitis, MEN syndrome.   Statin: Not taking  ACE/ARB: Not taking    Screening or Prevention Patient's value Goal Complete/Controlled?   HgA1C Testing and Control   Lab Results   Component Value Date    HGBA1C 5.5 07/28/2022      Annually/Less than 8% Yes   Lipid profile : 07/28/2022 Annually No   LDL control Lab Results   Component Value Date    LDLCALC 161.8 (H) 07/28/2022    Annually/Less than 100 mg/dl  No   Nephropathy screening No results found for: LABMICR  Lab Results   Component Value Date    PROTEINUA 3+ (A) 06/27/2018     No results found for: UTPCR   Annually No   Blood pressure BP Readings from Last 1 Encounters:   07/28/23 138/82    Less than 140/90 Yes   Dilated retinal exam Most Recent Eye Exam Date: Not Found Annually No   Foot exam   Most Recent Foot Exam Date: Not Found Annually No         Hyperlipidemia:  Patient was recently started on statin but never took the medication by Dr. WYATT.  Patient reports that he would like to try diet and exercise to reduce his cholesterol levels.  CHRONIC.  Uncontrolled LDL. Lab analysis reviewed.   (-) CP, SOB, abdominal pain, N/V/D, constipation, jaundice, skin changes.  (-) Myalgias  Lab  Results   Component Value Date    CHOL 232 (H) 07/28/2022    CHOL 227 (H) 07/28/2021    CHOL 221 (H) 01/28/2021     Lab Results   Component Value Date    HDL 43 07/28/2022    HDL 47 07/28/2021    HDL 43 01/28/2021     Lab Results   Component Value Date    LDLCALC 161.8 (H) 07/28/2022    LDLCALC 157.6 07/28/2021    LDLCALC 151.8 01/28/2021     Lab Results   Component Value Date    TRIG 136 07/28/2022    TRIG 112 07/28/2021    TRIG 131 01/28/2021     Lab Results   Component Value Date    CHOLHDL 18.5 (L) 07/28/2022    CHOLHDL 20.7 07/28/2021    CHOLHDL 19.5 (L) 01/28/2021     Lab Results   Component Value Date    TOTALCHOLEST 5.4 (H) 07/28/2022    TOTALCHOLEST 4.8 07/28/2021    TOTALCHOLEST 5.1 (H) 01/28/2021     Lab Results   Component Value Date    ALT 25 07/28/2022    AST 19 07/28/2022    ALKPHOS 74 07/28/2022    BILITOT 0.8 07/28/2022     ======================================================  The 10-year ASCVD risk score (Jesus CHILDERS, et al., 2019) is: 20.7%    Values used to calculate the score:      Age: 67 years      Sex: Male      Is Non- : No      Diabetic: No      Tobacco smoker: No      Systolic Blood Pressure: 138 mmHg      Is BP treated: No      HDL Cholesterol: 43 mg/dL      Total Cholesterol: 232 mg/dL      Do you take any herbs or supplements that were not prescribed by a doctor? no   Are you taking calcium supplements? no      History:  History of prostate cancer detected by physical by Dr. Méndez and removed  by radical prostatectomy in 2008 per record I do not have a copy of the op report..  PSA has been very low.  UROLOGIST: Kal Sprague MD  Date last PSA:  See diagnostic PSA above.  Lab Results   Component Value Date    PSA 0.02 07/28/2021    PSA 0.04 12/15/2015    PSA 0.02 12/09/2014      Colon cancer screening:Findings:        The perianal and digital rectal examinations were normal.        A 1 mm polyp was found in the descending colon. The polyp was        sessile.  The polyp was removed with a cold biopsy forceps. Resection        and retrieval were complete. Estimated blood loss was minimal.        A diffuse area of moderately erythematous mucosa was found in the        rectum and in the sigmoid colon. Biopsies were taken with a cold        forceps for histology. Estimated blood loss was minimal. this is        suspicious for ischemic colitis.        A few small-mouthed diverticula were found in the sigmoid colon.        The anus, descending colon, transverse colon, ascending colon,        cecum, appendiceal orifice, ileocecal valve and ileum appeared        normal.        A 1 mm polyp was found in the descending colon. The polyp was        sessile. The polyp was removed with a cold biopsy forceps. Resection        and retrieval were complete. Estimated blood loss was minimal.   Impression:           - One 1 mm polyp in the descending colon, removed                         with a cold biopsy forceps. Resected and retrieved.                         - Erythematous mucosa in the rectum and in the                         sigmoid colon. Biopsied.                         - Diverticulosis in the sigmoid colon.                         - The anus, descending colon, transverse colon,                         ascending colon, cecum, appendiceal orifice,                         ileocecal valve and terminal ileum are normal.                         - One 1 mm polyp in the descending colon, removed                         with a cold biopsy forceps. Resected and retrieved.   Recommendation:       - Patient has a contact number available for                         emergencies. The signs and symptoms of potential                         delayed complications were discussed with the                         patient. Return to normal activities tomorrow.                         Written discharge instructions were provided to the                         patient.                         - Resume  previous diet.                         - Continue present medications.                         - No aspirin, ibuprofen, naproxen, or other                         non-steroidal anti-inflammatory drugs.                         - Return to my office in 2 weeks.                         - Repeat colonoscopy in 5 years for surveillance.                         - Discharge patient to home (via wheelchair).   Rod Rayo MD   6/25/2020 1:59:57 PM     Health Maintenance Topics with due status: Not Due       Topic Last Completion Date    TETANUS VACCINE 11/25/2015    Colorectal Cancer Screening 06/25/2020    Influenza Vaccine 09/15/2022    Hemoglobin A1c (Diabetic Prevention Screening) 01/02/2023        ==============================================  History reviewed.     Health Maintenance Due   Topic Date Due    Abdominal Aortic Aneurysm Screening  Never done    COVID-19 Vaccine (5 - Moderna series) 10/05/2022    Lipid Panel  07/28/2023       Past Medical History:  Past Medical History:   Diagnosis Date    Allergy     Basal cell carcinoma     Cancer     Prostate    Colon polyp     Erosive gastritis 6/25/2020    Insulin resistance 7/28/2021     Past Surgical History:   Procedure Laterality Date    ADENOIDECTOMY  1966    Tonsils and adenoids removed    COLONOSCOPY N/A 12/1/2015    Procedure: COLONOSCOPY;  Surgeon: Rod Rayo MD;  Location: [facility];  Service: Endoscopy;  Laterality: N/A;    COLONOSCOPY N/A 6/25/2020    Procedure: COLONOSCOPY;  Surgeon: Rod Rayo MD;  Location: [facility];  Service: Endoscopy;  Laterality: N/A;    ESOPHAGOGASTRODUODENOSCOPY N/A 6/25/2020    Procedure: ESOPHAGOGASTRODUODENOSCOPY (EGD);  Surgeon: Rod Rayo MD;  Location: [facility];  Service: Endoscopy;  Laterality: N/A;    PROSTATE SURGERY  2008    radical prostatectomy    TONSILLECTOMY      Child    VASECTOMY       Review of patient's allergies indicates:   Allergen Reactions    Penicillins      Other reaction(s):  Swelling    Sulfa (sulfonamide antibiotics)      Other reaction(s): Rash    Bactrim  [sulfamethoxazole-trimethoprim] Rash     Current Outpatient Medications on File Prior to Visit   Medication Sig Dispense Refill    dexbrompheniramn-chlophedianol (CHLO HIST) 1-12.5 mg/5 mL Soln Take 10 mLs by mouth.      hydrOXYzine HCL (ATARAX) 25 MG tablet Take 1/2 to 1 tablet for itching as needed. **MAY CAUSE DROWSINESS** 60 tablet 11    ketoconazole (NIZORAL) 2 % shampoo       montelukast (SINGULAIR) 10 mg tablet TAKE 1 TABLET BY MOUTH EVERY EVENING 90 tablet 4    OZEMPIC 0.25 mg or 0.5 mg(2 mg/1.5 mL) pen injector Inject 0.5 mg into the skin every 7 days. 3 pen 4     No current facility-administered medications on file prior to visit.     Social History     Socioeconomic History    Marital status:    Tobacco Use    Smoking status: Former     Packs/day: 0.25     Years: 5.00     Pack years: 1.25     Types: Cigarettes, Cigars     Start date: 1977     Quit date: 1982     Years since quittin.6    Smokeless tobacco: Never    Tobacco comments:     Still crave one at times.  Makes me sick for days when I have one   Substance and Sexual Activity    Alcohol use: Yes     Alcohol/week: 6.0 standard drinks     Types: 6 Drinks containing 0.5 oz of alcohol per week     Comment: one a day    Drug use: No    Sexual activity: Not Currently     Partners: Female     Comment: My prostate removal has cause nerve damage-ED-must fix!     Social Determinants of Health     Financial Resource Strain: Low Risk     Difficulty of Paying Living Expenses: Not hard at all   Food Insecurity: No Food Insecurity    Worried About Running Out of Food in the Last Year: Never true    Ran Out of Food in the Last Year: Never true   Transportation Needs: No Transportation Needs    Lack of Transportation (Medical): No    Lack of Transportation (Non-Medical): No   Physical Activity: Sufficiently Active    Days of Exercise per Week: 2 days     Minutes of Exercise per Session: 150+ min   Stress: Stress Concern Present    Feeling of Stress : To some extent   Social Connections: Unknown    Frequency of Communication with Friends and Family: More than three times a week    Frequency of Social Gatherings with Friends and Family: More than three times a week    Active Member of Clubs or Organizations: Yes    Attends Club or Organization Meetings: 1 to 4 times per year    Marital Status:    Housing Stability: Low Risk     Unable to Pay for Housing in the Last Year: No    Number of Places Lived in the Last Year: 1    Unstable Housing in the Last Year: No     Family History   Problem Relation Age of Onset    Cancer Mother         Ovarian Cancer-       Vitals:    23 0800   BP: 138/82   Pulse:       Body mass index is 32.02 kg/m².     Review of Systems   Constitutional:  Negative for activity change and unexpected weight change.   HENT:  Positive for hearing loss. Negative for rhinorrhea and trouble swallowing.    Eyes:  Negative for discharge and visual disturbance.   Respiratory:  Negative for chest tightness and wheezing.    Cardiovascular:  Negative for chest pain and palpitations.   Gastrointestinal:  Negative for blood in stool, constipation, diarrhea and vomiting.   Endocrine: Negative for polydipsia and polyuria.   Genitourinary:  Negative for difficulty urinating, hematuria and urgency.   Musculoskeletal:  Positive for arthralgias. Negative for joint swelling and neck pain.   Neurological:  Negative for weakness and headaches.   Psychiatric/Behavioral:  Negative for confusion and dysphoric mood.         Objective:      Physical Exam  Vitals and nursing note reviewed.   Constitutional:       General: He is not in acute distress.     Appearance: He is well-developed.   HENT:      Head: Normocephalic and atraumatic.   Eyes:      Pupils: Pupils are equal, round, and reactive to light.   Cardiovascular:      Rate and Rhythm: Normal rate and  regular rhythm.      Heart sounds: Normal heart sounds. No murmur heard.  Pulmonary:      Effort: Pulmonary effort is normal. No respiratory distress.      Breath sounds: Normal breath sounds. No wheezing.   Abdominal:      General: Bowel sounds are normal.      Palpations: Abdomen is soft.   Musculoskeletal:         General: Normal range of motion.      Cervical back: Normal range of motion and neck supple.   Skin:     General: Skin is warm and dry.      Capillary Refill: Capillary refill takes less than 2 seconds.   Neurological:      Mental Status: He is alert and oriented to person, place, and time.      Cranial Nerves: No cranial nerve deficit.   Psychiatric:         Behavior: Behavior normal.    + Right 5th digit tenderness to palpation decreased range of motion due to pain    Assessment / Plan:      1.  Routine health exam-patient here for annual wellness exam.  Labs ordered.  Health maintenance was reviewed and ordered.    Right finger pain:  Check x-ray start Voltaren gel topically.  Trial of occupational therapy.  If no improvement recommend orthopedic hand consult.    Insulin resistance:  GLP 1 risk and benefits and common side effects of medication discussed with the patient at length.  All questions were answered.  Discussed with patient at length about potential pharmacologic options.  Patient desires consideration for Ozempic .  The risks, benefits and, side effects of this GLP 1 medication including nausea , constipation, diarrhea and pain injection site, etcetera.   Patient reports no personal or family history of pancreatitis, MEN or medullary thyroid cancer.  There is potential for gallbladder issues while taking this medication if not already removed.  Patient should be advised to caution with taking this medication if having history of thyroid cancer or biliary disease/gallstones.  Patient should be aware of risk of diabetic retinopathy if blood sugars lowered too quickly.  Patient is aware that  weight loss can and will most likely occur quickly.  Discussed GLP 1 mechanism of action.    Hyperlipidemia:  Patient will continue lifestyle modification and increasing Ozempic dosage for weight loss.  Previously  Trial of diet and exercise.  Patient does have elevated ASCVD score however he does not want to take statin medication.  Will continue to monitor and recheck lipid panel in six months.Counseled on hyperlipidemia disease course, healthy diet and increased need for exercise.  Please be advised of the risk of cardiovascular disease, increase stroke and heart attack risk with uncontrolled/untreated hyperlipidemia.     Patient voiced understanding and understood the treatment plan. All questions were answered.     History of prostate cancer:  Check diagnostic PSA yearly.  Insomnia/itching:  Continue hydroxyzine 25 milligrams at bedtime.Discussed insomnia condition course.  Advised of first-line medications for this condition.  Also discussed sleep hygiene.  Information was given below.  Good sleep habits (sometimes referred to as sleep hygiene) can help you get a good nights sleep.    Some habits that can improve your sleep health:  -Be consistent. Go to bed at the same time each night and get up at the same time each morning, including on the weekends  -Make sure your bedroom is quiet, dark, relaxing, and at a comfortable temperature  -Remove electronic devices, such as TVs, computers, and smart phones, from the bedroom  -Avoid large meals, caffeine, and alcohol before bedtime  -Get some exercise. Being physically active during the day can help you fall asleep more easily at night.    Weight gain/insulin resistance:  Continue Ozempic.  We will plan to monitor hemoglobin A1c at designated intervals 3 to 6 months.  I recommend ongoing Education for diabetic diet and exercise protocol.  We will continue to monitor for side effects.    Please be advised of symptoms to monitor for and to notify me immediately  if persistent or worsening.  Follow up with Ophthalmology/Optometry and Podiatry at least annually.    Denies history of pancreatitis, thyroid cancer, MEN syndrome.    Hearing loss:  Follow-up with audiology.    Complete history and physical was completed today.  Complete and thorough medication reconciliation was performed.  Discussed risks and benefits of medications.  Advised patient on orders and health maintenance.  We discussed old records and old labs if available.  Will request any records not available through epic.  Continue current medications listed on your summary sheet.    All questions were answered. Patient had no further concerns. Advised of diagnoses and plan. Follow up as planned or return sooner if symptoms persist or worsen.     Orders Placed This Encounter   Procedures    US Carotid Bilateral     Standing Status:   Future     Standing Expiration Date:   7/28/2024    US AAA Screening     Standing Status:   Future     Standing Expiration Date:   7/28/2024     Order Specific Question:   May the Radiologist modify the order per protocol to meet the clinical needs of the patient?     Answer:   Yes     Order Specific Question:   Release to patient     Answer:   Immediate    CBC Without Differential     Standing Status:   Future     Standing Expiration Date:   9/25/2024    Comprehensive Metabolic Panel     Standing Status:   Future     Standing Expiration Date:   9/25/2024    TSH     Standing Status:   Future     Standing Expiration Date:   9/25/2024    Hemoglobin A1C     Standing Status:   Future     Standing Expiration Date:   9/25/2024    Lipid Panel     Standing Status:   Future     Standing Expiration Date:   9/25/2024    Urinalysis     Standing Status:   Future     Standing Expiration Date:   9/25/2024     Order Specific Question:   Collection Type     Answer:   Urine, Clean Catch    Ambulatory referral/consult to Physical/Occupational Therapy     Standing Status:   Future     Standing  Expiration Date:   8/28/2024     Referral Priority:   Routine     Referral Type:   Physical Medicine     Referral Reason:   Specialty Services Required     Number of Visits Requested:   1    IN OFFICE EKG 12-LEAD (to [place])     Standing Status:   Future     Standing Expiration Date:   7/28/2024             Future Appointments       Date Provider Specialty Appt Notes    8/14/2023 Vu Lyn, PT Outpatient Rehab 2x a week  No copay    8/16/2023 Vu Lyn, PT Outpatient Rehab 2x a week  No copay    8/21/2023 Vucriselda Lyn, PT Outpatient Rehab 2x a week  No copay    8/23/2023 Vu Quebedlisbeth, PT Outpatient Rehab 2x a week  No copay            Dustin Reyes MD

## 2023-08-08 ENCOUNTER — PATIENT MESSAGE (OUTPATIENT)
Dept: FAMILY MEDICINE | Facility: CLINIC | Age: 68
End: 2023-08-08
Payer: COMMERCIAL

## 2023-08-10 ENCOUNTER — PATIENT MESSAGE (OUTPATIENT)
Dept: AUDIOLOGY | Facility: CLINIC | Age: 68
End: 2023-08-10
Payer: COMMERCIAL

## 2023-08-14 ENCOUNTER — CLINICAL SUPPORT (OUTPATIENT)
Dept: REHABILITATION | Facility: HOSPITAL | Age: 68
End: 2023-08-14
Attending: STUDENT IN AN ORGANIZED HEALTH CARE EDUCATION/TRAINING PROGRAM
Payer: COMMERCIAL

## 2023-08-14 ENCOUNTER — OFFICE VISIT (OUTPATIENT)
Dept: ORTHOPEDICS | Facility: CLINIC | Age: 68
End: 2023-08-14
Payer: COMMERCIAL

## 2023-08-14 DIAGNOSIS — M25.671 DECREASED RANGE OF MOTION OF BOTH ANKLES: Primary | ICD-10-CM

## 2023-08-14 DIAGNOSIS — M79.644 PAIN OF FINGER OF RIGHT HAND: ICD-10-CM

## 2023-08-14 DIAGNOSIS — M65.351 TRIGGER LITTLE FINGER OF RIGHT HAND: Primary | ICD-10-CM

## 2023-08-14 DIAGNOSIS — R68.89 DECREASED ACTIVITY TOLERANCE: ICD-10-CM

## 2023-08-14 DIAGNOSIS — M25.672 DECREASED RANGE OF MOTION OF BOTH ANKLES: Primary | ICD-10-CM

## 2023-08-14 PROCEDURE — 97112 NEUROMUSCULAR REEDUCATION: CPT | Mod: PN

## 2023-08-14 PROCEDURE — 1160F RVW MEDS BY RX/DR IN RCRD: CPT | Mod: CPTII,S$GLB,, | Performed by: STUDENT IN AN ORGANIZED HEALTH CARE EDUCATION/TRAINING PROGRAM

## 2023-08-14 PROCEDURE — 1159F MED LIST DOCD IN RCRD: CPT | Mod: CPTII,S$GLB,, | Performed by: STUDENT IN AN ORGANIZED HEALTH CARE EDUCATION/TRAINING PROGRAM

## 2023-08-14 PROCEDURE — 99204 OFFICE O/P NEW MOD 45 MIN: CPT | Mod: 25,S$GLB,, | Performed by: STUDENT IN AN ORGANIZED HEALTH CARE EDUCATION/TRAINING PROGRAM

## 2023-08-14 PROCEDURE — 1160F PR REVIEW ALL MEDS BY PRESCRIBER/CLIN PHARMACIST DOCUMENTED: ICD-10-PCS | Mod: CPTII,S$GLB,, | Performed by: STUDENT IN AN ORGANIZED HEALTH CARE EDUCATION/TRAINING PROGRAM

## 2023-08-14 PROCEDURE — 97140 MANUAL THERAPY 1/> REGIONS: CPT | Mod: PN

## 2023-08-14 PROCEDURE — 97110 THERAPEUTIC EXERCISES: CPT | Mod: PN

## 2023-08-14 PROCEDURE — 20550 NJX 1 TENDON SHEATH/LIGAMENT: CPT | Mod: RT,S$GLB,, | Performed by: STUDENT IN AN ORGANIZED HEALTH CARE EDUCATION/TRAINING PROGRAM

## 2023-08-14 PROCEDURE — 99999 PR PBB SHADOW E&M-EST. PATIENT-LVL III: ICD-10-PCS | Mod: PBBFAC,,, | Performed by: STUDENT IN AN ORGANIZED HEALTH CARE EDUCATION/TRAINING PROGRAM

## 2023-08-14 PROCEDURE — 3044F PR MOST RECENT HEMOGLOBIN A1C LEVEL <7.0%: ICD-10-PCS | Mod: CPTII,S$GLB,, | Performed by: STUDENT IN AN ORGANIZED HEALTH CARE EDUCATION/TRAINING PROGRAM

## 2023-08-14 PROCEDURE — 1159F PR MEDICATION LIST DOCUMENTED IN MEDICAL RECORD: ICD-10-PCS | Mod: CPTII,S$GLB,, | Performed by: STUDENT IN AN ORGANIZED HEALTH CARE EDUCATION/TRAINING PROGRAM

## 2023-08-14 PROCEDURE — 99999 PR PBB SHADOW E&M-EST. PATIENT-LVL III: CPT | Mod: PBBFAC,,, | Performed by: STUDENT IN AN ORGANIZED HEALTH CARE EDUCATION/TRAINING PROGRAM

## 2023-08-14 PROCEDURE — 20550 TENDON SHEATH: ICD-10-PCS | Mod: RT,S$GLB,, | Performed by: STUDENT IN AN ORGANIZED HEALTH CARE EDUCATION/TRAINING PROGRAM

## 2023-08-14 PROCEDURE — 99204 PR OFFICE/OUTPT VISIT, NEW, LEVL IV, 45-59 MIN: ICD-10-PCS | Mod: 25,S$GLB,, | Performed by: STUDENT IN AN ORGANIZED HEALTH CARE EDUCATION/TRAINING PROGRAM

## 2023-08-14 PROCEDURE — 3044F HG A1C LEVEL LT 7.0%: CPT | Mod: CPTII,S$GLB,, | Performed by: STUDENT IN AN ORGANIZED HEALTH CARE EDUCATION/TRAINING PROGRAM

## 2023-08-14 RX ORDER — BETAMETHASONE SODIUM PHOSPHATE AND BETAMETHASONE ACETATE 3; 3 MG/ML; MG/ML
6 INJECTION, SUSPENSION INTRA-ARTICULAR; INTRALESIONAL; INTRAMUSCULAR; SOFT TISSUE
Status: DISCONTINUED | OUTPATIENT
Start: 2023-08-14 | End: 2023-08-14 | Stop reason: HOSPADM

## 2023-08-14 RX ADMIN — BETAMETHASONE SODIUM PHOSPHATE AND BETAMETHASONE ACETATE 6 MG: 3; 3 INJECTION, SUSPENSION INTRA-ARTICULAR; INTRALESIONAL; INTRAMUSCULAR; SOFT TISSUE at 10:08

## 2023-08-14 NOTE — PATIENT INSTRUCTIONS
Assessment:  Blayne Mendoza is a 67 y.o. male   Chief Complaint   Patient presents with    Right Hand - Pain       Encounter Diagnosis   Name Primary?    Pain of finger of right hand         Plan:  Cortizone Steroid injection  right trigger finger  We discussed the proper protocols after the injection such as no submerging pools, baths tubs, or hot tubs for 24 hr.  Showering is okay today.  We also discussed that blood sugars can be elevated after an injection and asked patient to properly checked her sugars over the next few days and contact their PCP if there are any concerns.  We discussed red flags such as fevers, chills, red, warm, tender joint at the area of injection to please seek medical care immediately.    Apply topical diclofenac (Voltaren) up to 4 times a day to the affected area.  It can be bought over the counter at any local pharmacy.    Patient can use ice as needed  Patient can purchase over the counter trigger finger splint from store.    Follow-up: PRN or sooner if there are any problems between now and then.    Thank you for choosing [facility] and Dr. Christopher Bhakta for your orthopedic & sports medicine care. It is our goal to provide you with exceptional care that will help keep you healthy, active, and get you back in the game.    Please do not hesitate to reach out to us via email, phone, or MyChart with any questions, concerns, or feedback.    If you felt that you received exemplary care today, please consider leaving us feedback on [employer] at:  [email or web address]    If you are experiencing pain/discomfort ,or have questions after 5pm and would like to be connected to the [facility] on-call team, please call this number and specify which Sports Medicine provider is treating you: [phone]

## 2023-08-14 NOTE — PROGRESS NOTES
Patient ID: Blayne Mendoza  YOB: 1955  MRN: [id number]    Chief Complaint: Pain of the Right Hand      Referred By: self    History of Present Illness: Blayne Mendoza is a right-hand dominant 67 y.o. male who presents today with right 5th MCP joint pain. Patient states that he has been having right ring and pinky finger problems for years. He said that when he was in [employer], he was always having to taped those two fingers together due to them getting jammed all the time. He is a , he does a lot of typing all day everyday. Hurts in the joint of ring and pinky finger. He says at times the pain is so intense, he could tear up. Uses Voltaren Gel prescribed by PCP.     The patient is active in none.  Occupation:       Past Medical History:   Past Medical History:   Diagnosis Date    Allergy     Basal cell carcinoma     Cancer     Prostate    Colon polyp     Erosive gastritis 2020    Insulin resistance 2021     Past Surgical History:   Procedure Laterality Date    ADENOIDECTOMY  1966    Tonsils and adenoids removed    COLONOSCOPY N/A 2015    Procedure: COLONOSCOPY;  Surgeon: Rod Rayo MD;  Location: [facility];  Service: Endoscopy;  Laterality: N/A;    COLONOSCOPY N/A 2020    Procedure: COLONOSCOPY;  Surgeon: Rod Rayo MD;  Location: [facility];  Service: Endoscopy;  Laterality: N/A;    ESOPHAGOGASTRODUODENOSCOPY N/A 2020    Procedure: ESOPHAGOGASTRODUODENOSCOPY (EGD);  Surgeon: Rod Rayo MD;  Location: [facility];  Service: Endoscopy;  Laterality: N/A;    PROSTATE SURGERY      radical prostatectomy    TONSILLECTOMY      Child    VASECTOMY       Family History   Problem Relation Age of Onset    Cancer Mother         Ovarian Cancer-     Social History     Socioeconomic History    Marital status:    Tobacco Use    Smoking status: Former     Current packs/day: 0.00     Average packs/day: 0.3 packs/day for 5.0  years (1.3 ttl pk-yrs)     Types: Cigarettes, Cigars     Start date: 1977     Quit date: 1982     Years since quittin.7    Smokeless tobacco: Never    Tobacco comments:     Still crave one at times.  Makes me sick for days when I have one   Substance and Sexual Activity    Alcohol use: Yes     Alcohol/week: 6.0 standard drinks of alcohol     Types: 6 Drinks containing 0.5 oz of alcohol per week     Comment: one a day    Drug use: No    Sexual activity: Not Currently     Partners: Female     Comment: My prostate removal has cause nerve damage-ED-must fix!     Social Determinants of Health     Financial Resource Strain: Low Risk  (2023)    Overall Financial Resource Strain (CARDIA)     Difficulty of Paying Living Expenses: Not hard at all   Food Insecurity: No Food Insecurity (2023)    Hunger Vital Sign     Worried About Running Out of Food in the Last Year: Never true     Ran Out of Food in the Last Year: Never true   Transportation Needs: No Transportation Needs (2023)    PRAPARE - Transportation     Lack of Transportation (Medical): No     Lack of Transportation (Non-Medical): No   Physical Activity: Sufficiently Active (2023)    Exercise Vital Sign     Days of Exercise per Week: 2 days     Minutes of Exercise per Session: 150+ min   Stress: Stress Concern Present (2023)    [place] [employer] - Occupational Stress Questionnaire     Feeling of Stress : To some extent   Social Connections: Unknown (2023)    Social Connection and Isolation Panel [NHANES]     Frequency of Communication with Friends and Family: More than three times a week     Frequency of Social Gatherings with Friends and Family: More than three times a week     Active Member of Clubs or Organizations: Yes     Attends Club or Organization Meetings: 1 to 4 times per year     Marital Status:    Housing Stability: Low Risk  (2023)    Housing Stability Vital Sign     Unable  to Pay for Housing in the Last Year: No     Number of Places Lived in the Last Year: 1     Unstable Housing in the Last Year: No     Medication List with Changes/Refills   Current Medications    DEXBROMPHENIRAMN-CHLOPHEDIANOL (CHLO HIST) 1-12.5 MG/5 ML SOLN    Take 10 mLs by mouth.    HYDROXYZINE HCL (ATARAX) 25 MG TABLET    Take 1/2 to 1 tablet for itching as needed. **MAY CAUSE DROWSINESS**    KETOCONAZOLE (NIZORAL) 2 % SHAMPOO        MONTELUKAST (SINGULAIR) 10 MG TABLET    TAKE 1 TABLET BY MOUTH EVERY EVENING    OZEMPIC 0.25 MG OR 0.5 MG(2 MG/1.5 ML) PEN INJECTOR    Inject 0.5 mg into the skin every 7 days.     Review of patient's allergies indicates:   Allergen Reactions    Penicillins      Other reaction(s): Swelling    Sulfa (sulfonamide antibiotics)      Other reaction(s): Rash    Bactrim  [sulfamethoxazole-trimethoprim] Rash       Physical Exam:   There is no height or weight on file to calculate BMI.    GENERAL: Well appearing, in no acute distress.  HEAD: Normocephalic and atraumatic.  ENT: External ears and nose grossly normal.  EYES: EOMI bilaterally  PULMONARY: Respirations are grossly even and non-labored.  NEURO: Awake, alert, and oriented x 3.  SKIN: No obvious rashes appreciated.  PSYCH: Mood & affect are appropriate.    Detailed MSK exam:     Right hand/wrist exam:   -TTP: 5th MCP volar, active triggering 5th digit  -Swelling/ecchymosis: none  -Full ROM  - strength 5/5  -Sensation intact  -Pulses 2+  -Rotational deformity: negative  -Tinel sign: negative  -Phalen sign: negative  -Finkelstein sign: negative      Imaging:  X-Ray Hand Complete Right  Narrative: EXAMINATION:  XR HAND COMPLETE 3 VIEW RIGHT    CLINICAL HISTORY:  Pain in right finger(s)    TECHNIQUE:  PA, lateral, and oblique views of the right hand were performed.    COMPARISON:  None    FINDINGS:  No acute fracture or dislocation.  Mild degenerative changes involving the thumb interphalangeal joint.  Impression: As  above.    Electronically signed by: Elliott Melissa  Date:    07/28/2023  Time:    09:23        Relevant imaging results were reviewed and interpreted by me and per my read shows mild arthritic changes thumb IP joint.  This was discussed with the patient and / or family today.     Assessment:  Blayne Mendoza is a 67 y.o. male presenting with right hand pain.   History, physical and radiographs are consistent with a likely diagnosis of right 5th finger trigger finger.   Plan: Steroid injection given today (see separate procedure note for details). We discussed the proper protocols after the injection such as no submerging pools, baths tubs, or hot tubs for 24 hr.  Showering is okay today.  We also discussed that blood sugars can be elevated after an injection and asked patient to properly checked her sugars over the next few days and contact their PCP if there are any concerns.  We discussed red flags such as fevers, chills, red, warm, tender joint at the area of injection to please seek medical care immediately.   Voltaren gel as needed. OTC trigger finger splint at night. Continue conservative management for pain.   Follow up as needed. All questions answered.      Trigger little finger of right hand  -     Tendon Sheath    Pain of finger of right hand  -     Ambulatory referral/consult to Orthopedics           A copy of today's visit note has been sent to the referring provider.     Electronically signed:  Christopher Bhakta MD, MPH  08/14/2023  10:02 AM

## 2023-08-14 NOTE — PROCEDURES
Tendon Sheath    Date/Time: 8/14/2023 10:20 AM    Performed by: Christopher Bhakta MD  Authorized by: Christopher Bhakta MD    Consent Done?:  Yes (Verbal)  Indications:  Pain  Site marked: the procedure site was marked    Timeout: prior to procedure the correct patient, procedure, and site was verified    Prep: patient was prepped and draped in usual sterile fashion      Local anesthetic:  Bupivacaine 0.5% without epinephrine and lidocaine 1% without epinephrine  Location:  Small finger  Site:  R small flexor tendon sheath  Needle size:  27 G  Approach:  Volar  Medications:  6 mg betamethasone acetate-betamethasone sodium phosphate 6 mg/mL  Patient tolerance:  Patient tolerated the procedure well with no immediate complications

## 2023-08-14 NOTE — PROGRESS NOTES
[facility]   Physical Therapy Treatment Note      Name: Blayne Mendoza  [facility] Number: [id number]    Therapy Diagnosis:   Encounter Diagnoses   Name Primary?    Decreased range of motion of both ankles Yes    Decreased activity tolerance      Physician: Miguel Delgado DPM    Visit Date: 8/14/2023    Physician Orders: PT Eval and Treat  Medical Diagnosis from Referral: Plantar fasciitis [M72.2]  Evaluation Date: 7/24/2023  Authorization Period Expiration: 8/24/2023  Plan of Care Expiration: 9/18/2023  Progress Note Due: 8/24/2023  Visit # / Visits authorized: 1/ 1   FOTO: 1/3     Precautions: Standard, hx of cancer     Time In: 0835 am  Time Out: 0925 am  Total Appointment Time (timed & untimed codes): 50 minutes    PTA Visit #: 0/5     Subjective     Pt reports: stretching and exercises have helped since his last visit. He is hoping to start with a personal training soon.    He was compliant with home exercise program.  Response to previous treatment: reduced pain and stiffness  Functional change: increasing exercise    Pain: 3/10  Location: medial left foot     Objective      Objective Measures updated at progress report unless specified.     Treatment     Isaac received the treatments listed below:      therapeutic exercises to develop strength, endurance, ROM, flexibility, posture, and core stabilization for (27) minutes including:  Standing calf stretch on wedge 2 min B  Standing soleus stretch on wedge 2 min alternating  Recumbent bike 7 min lvl 5  Flex bar plantar fascia STM 2 min each  Airex plantar fascia stretch 10x10 sec each  Airex plantarflexion stretch 10x10 sec each  Bilateral calf raises with UE support x20 reps  Great toe extension stretch 10x10 sec each    Isaac received the following manual therapy techniques: applied to the: left ankle for (8) minutes, including:  Joint mobilizations  Passive stretching     neuromuscular re-education activities to improve: Balance,  Coordination, Kinesthetic, Sense, Proprioception, and Posture for (15) minutes. The following activities were included:  Short foot x30 reps B  Toe yoga x20 reps B  Patient education and HEP review    Patient Education and Home Exercises       Education provided:   - HEP provided and reviewed  - Anatomy and Physiology pertaining to current condition  - Possible response to exercise  - Importance of PT compliance    Written Home Exercises Provided: Patient instructed to cont prior HEP. Exercises were reviewed and Iasac was able to demonstrate them prior to the end of the session.  Isaac demonstrated good  understanding of the education provided. See EMR under Patient Instructions for exercises provided during therapy sessions    Assessment     Pt tolerates all mobility exercises and stretching to improve ankle mobility and reduce discomfort. Most noticeable pain is medial ankle near plantar fascia origin/medial tubercle of calcaneus. Pt able to begin foot intrinsic strengthening activities with verbal and visual cueing from PT. Pt feels relief with standing airex stretching which really targeted area of discomfort. He would benefit from continuance at this time.     Isaac Is progressing well towards his goals.   Pt prognosis is Good.     Pt will continue to benefit from skilled outpatient physical therapy to address the deficits listed in the problem list box on initial evaluation, provide pt/family education and to maximize pt's level of independence in the home and community environment.     Pt's spiritual, cultural and educational needs considered and pt agreeable to plan of care and goals.     Anticipated barriers to physical therapy: none    Goals:  Short Term Goals:  4 weeks  1.Pt to be educated on HEP. (met)  2.Patient to demo increased knee to wall test mobility by 1 inch. (progressing, not met)  3.Patient to have decreased pain to less than 5/10 to improve quality of movement. (progressing, not met)  4.Patient  to increase SL bal to greater than 10 sec without discomfort. (progressing, not met)  5.Patient to improve score on the FOTO by 5. (progressing, not met)     Long Term Goals: 8 weeks  1. Patient to perform daily activities including exercise, recreational activities, yard work without limitation. (progressing, not met)  2. Patient to demonstrate increased left ankle ROM and mobility without pain to improve functional mobility. (progressing, not met)  3. Patient to have decreased pain to 2/10 with light activities to improve quality of movement. (progressing, not met)  4. Patient to demonstrate improved lower extremity flexibility to reduce onset of symptoms. (progressing, not met)    Plan     Continue with PT POC to address functional limitations.    Vu Lyn, PT DPT

## 2023-08-16 ENCOUNTER — CLINICAL SUPPORT (OUTPATIENT)
Dept: REHABILITATION | Facility: HOSPITAL | Age: 68
End: 2023-08-16
Attending: STUDENT IN AN ORGANIZED HEALTH CARE EDUCATION/TRAINING PROGRAM
Payer: COMMERCIAL

## 2023-08-16 DIAGNOSIS — M25.671 DECREASED RANGE OF MOTION OF BOTH ANKLES: Primary | ICD-10-CM

## 2023-08-16 DIAGNOSIS — R68.89 DECREASED ACTIVITY TOLERANCE: ICD-10-CM

## 2023-08-16 DIAGNOSIS — M25.672 DECREASED RANGE OF MOTION OF BOTH ANKLES: Primary | ICD-10-CM

## 2023-08-16 PROCEDURE — 97110 THERAPEUTIC EXERCISES: CPT | Mod: PN

## 2023-08-16 PROCEDURE — 97140 MANUAL THERAPY 1/> REGIONS: CPT | Mod: PN

## 2023-08-16 PROCEDURE — 97112 NEUROMUSCULAR REEDUCATION: CPT | Mod: PN

## 2023-08-16 NOTE — PROGRESS NOTES
[facility]   Physical Therapy Treatment Note      Name: Blayne Mendoza  [facility] Number: [id number]    Therapy Diagnosis:   Encounter Diagnoses   Name Primary?    Decreased range of motion of both ankles Yes    Decreased activity tolerance        Physician: Miguel Delgado DPM    Visit Date: 8/16/2023    Physician Orders: PT Eval and Treat  Medical Diagnosis from Referral: Plantar fasciitis [M72.2]  Evaluation Date: 7/24/2023  Authorization Period Expiration: 8/24/2023  Plan of Care Expiration: 9/18/2023  Progress Note Due: 8/24/2023  Visit # / Visits authorized: 2/20 auth (1/1 eval)   FOTO: 1/3     Precautions: Standard, hx of cancer     Time In: 0920 am  Time Out: 1000 am  Total Appointment Time (timed & untimed codes): 40 minutes    PTA Visit #: 0/5     Subjective     Pt reports: overall symptoms are already improving in both heels.     He was compliant with home exercise program.  Response to previous treatment: reduced pain and stiffness  Functional change: increasing exercise    Pain: 2/10  Location: medial left foot     Objective      Objective Measures updated at progress report unless specified.     Treatment     Isaac received the treatments listed below:      therapeutic exercises to develop strength, endurance, ROM, flexibility, posture, and core stabilization for (24) minutes including:  Standing calf stretch on wedge 2 min B  Standing soleus stretch on wedge 2 min alternating  Recumbent bike 5 min lvl 5  Flex bar plantar fascia STM 2 min each  Airex plantar fascia stretch 10x10 sec each  Airex plantarflexion stretch 10x10 sec each  Bilateral calf raises with UE support x20 reps  Great toe extension stretch 10x10 sec each    Isaac received the following manual therapy techniques: applied to the: left ankle for (8) minutes, including:  Joint mobilizations  Passive stretching     neuromuscular re-education activities to improve: Balance, Coordination, Kinesthetic, Sense,  Proprioception, and Posture for (8) minutes. The following activities were included:  Short foot x30 reps B  Toe yoga x20 reps B    Patient Education and Home Exercises       Education provided:   - HEP provided and reviewed  - Anatomy and Physiology pertaining to current condition  - Possible response to exercise  - Importance of PT compliance    Written Home Exercises Provided: Patient instructed to cont prior HEP. Exercises were reviewed and Isaac was able to demonstrate them prior to the end of the session.  Isaac demonstrated good  understanding of the education provided. See EMR under Patient Instructions for exercises provided during therapy sessions    Assessment     Reduced sensitivity in left heel compared to previous visit. Stretching activities have helped but hasn't had much time to complete them at home. Pt demonstrates improved intrinsic recruitment without cueing.     Isaac Is progressing well towards his goals.   Pt prognosis is Good.     Pt will continue to benefit from skilled outpatient physical therapy to address the deficits listed in the problem list box on initial evaluation, provide pt/family education and to maximize pt's level of independence in the home and community environment.     Pt's spiritual, cultural and educational needs considered and pt agreeable to plan of care and goals.     Anticipated barriers to physical therapy: none    Goals:  Short Term Goals:  4 weeks  1.Pt to be educated on HEP. (met)  2.Patient to demo increased knee to wall test mobility by 1 inch. (progressing, not met)  3.Patient to have decreased pain to less than 5/10 to improve quality of movement. (progressing, not met)  4.Patient to increase SL bal to greater than 10 sec without discomfort. (progressing, not met)  5.Patient to improve score on the FOTO by 5. (progressing, not met)     Long Term Goals: 8 weeks  1. Patient to perform daily activities including exercise, recreational activities, yard work without  limitation. (progressing, not met)  2. Patient to demonstrate increased left ankle ROM and mobility without pain to improve functional mobility. (progressing, not met)  3. Patient to have decreased pain to 2/10 with light activities to improve quality of movement. (progressing, not met)  4. Patient to demonstrate improved lower extremity flexibility to reduce onset of symptoms. (progressing, not met)    Plan     Continue with PT POC to address functional limitations.    Vu Lyn, PT DPT

## 2023-09-07 ENCOUNTER — PATIENT MESSAGE (OUTPATIENT)
Dept: FAMILY MEDICINE | Facility: CLINIC | Age: 68
End: 2023-09-07
Payer: COMMERCIAL

## 2023-09-07 DIAGNOSIS — E88.819 INSULIN RESISTANCE: ICD-10-CM

## 2023-09-07 DIAGNOSIS — A49.9 BACTERIAL INFECTION: Primary | ICD-10-CM

## 2023-09-07 RX ORDER — METHYLPREDNISOLONE 4 MG/1
TABLET ORAL
Qty: 21 TABLET | Refills: 0 | Status: SHIPPED | OUTPATIENT
Start: 2023-09-07 | End: 2024-03-06 | Stop reason: SDUPTHER

## 2023-09-07 RX ORDER — AZITHROMYCIN 250 MG/1
TABLET, FILM COATED ORAL
Qty: 6 TABLET | Refills: 0 | Status: SHIPPED | OUTPATIENT
Start: 2023-09-07 | End: 2023-09-12

## 2023-09-07 RX ORDER — MONTELUKAST SODIUM 10 MG/1
TABLET ORAL
Qty: 90 TABLET | Refills: 3 | Status: SHIPPED | OUTPATIENT
Start: 2023-09-07

## 2023-09-07 RX ORDER — SEMAGLUTIDE 0.68 MG/ML
0.5 INJECTION, SOLUTION SUBCUTANEOUS WEEKLY
Qty: 9 ML | Refills: 1 | Status: SHIPPED | OUTPATIENT
Start: 2023-09-07 | End: 2024-03-06 | Stop reason: SDUPTHER

## 2023-09-07 NOTE — TELEPHONE ENCOUNTER
No care due was identified.  [facility] Embedded Care Due Messages. Reference number: [id number].   9/07/2023 9:48:15 AM CDT

## 2023-09-07 NOTE — TELEPHONE ENCOUNTER
Refill Routing Note   Medication(s) are not appropriate for processing by [facility] for the following reason(s):      Due for refill >6 months ago    [facility] action(s):  Defer  Approve Care Due:  None identified     Medication Therapy Plan: NEW PACKAGE SIZE 3ML      Appointments  past 12m or future 3m with PCP    Date Provider   Last Visit   7/28/2023 Dustin Reyes MD   Next Visit   Visit date not found Dustin Reyes MD   ED visits in past 90 days: 0        Note composed:11:33 AM 09/07/2023

## 2023-09-07 NOTE — TELEPHONE ENCOUNTER
I received your message which was reviewed along with the the medication list and allergies that we have below.  Please review it for accuracy to make sure that we have the most recent records on your history.     Based on this, the following orders were placed AND/OR medicines were sent in.     No orders of the defined types were placed in this encounter.      Medications written and sent at this time include:  Medications Ordered This Encounter   Medications    azithromycin (Z-NICOLASA) 250 MG tablet     Sig: Take 2 tablets by mouth on day 1; Take 1 tablet by mouth on days 2-5     Dispense:  6 tablet     Refill:  0    methylPREDNISolone (MEDROL DOSEPACK) 4 mg tablet     Sig: follow package directions     Dispense:  21 tablet     Refill:  0       Your pharmacy(ies) of choice at this time on record include the list below and any medications would have been sent to the one at the top.    Counts include 234 beds at the [facility] [address] [address]  Phone: [phone] Fax: [phone]      Thank you for choosing us as your healthcare provider!  Dr. Dustin Reyes    ALLERGY LIST  Review of patient's allergies indicates:   Allergen Reactions    Penicillins      Other reaction(s): Swelling    Sulfa (sulfonamide antibiotics)      Other reaction(s): Rash    Bactrim  [sulfamethoxazole-trimethoprim] Rash       MEDICATION LIST  Current Outpatient Medications on File Prior to Visit   Medication Sig Dispense Refill    dexbrompheniramn-chlophedianol (CHLO HIST) 1-12.5 mg/5 mL Soln Take 10 mLs by mouth.      hydrOXYzine HCL (ATARAX) 25 MG tablet Take 1/2 to 1 tablet for itching as needed. **MAY CAUSE DROWSINESS** 60 tablet 11    ketoconazole (NIZORAL) 2 % shampoo       montelukast (SINGULAIR) 10 mg tablet TAKE 1 TABLET BY MOUTH EVERY EVENING 90 tablet 4    OZEMPIC 0.25 mg or 0.5 mg(2 mg/1.5 mL) pen injector Inject 0.5 mg into the skin every 7 days. 3 pen 4     No current  facility-administered medications on file prior to visit.       HEALTH MAINTENANCE THAT IS OVERDUE OR NEEDS TO BE UPDATED ON OUR CHART IS LISTED BELOW.  IF YOU HAVE HAD IT DONE ELSEWHERE, PLEASE SEND US DATES AND RECORDS IF YOU HAVE THEM TO MAKE YOUR CHART ACCURATE.  IF YOU HAVE NOT HAD THESE DONE AND ARE READY FOR US TO SCHEDULE THEM, PLEASE SEND US A MESSAGE.  Health Maintenance Due   Topic Date Due    Abdominal Aortic Aneurysm Screening  Never done    Influenza Vaccine (1) 09/01/2023       DISCLAIMER: This note was compiled by using a speech recognition dictation system and therefore please be aware that typographical / speech recognition errors can and do occur.  Please contact me if you see any errors specifically.    Dustin Reyes MD  We Offer Telehealth & Same Day Appointments!   Book your Telehealth appointment with me through my nurse or   Clinic appointments on [employer] (formerly [employer])!  [phone]     Check out my Facebook Page and Follow Me at: CLICK HERE    Check out my website at [employer] by clicking on: CLICK HERE    To Schedule appointments online, go to [employer] (formerly [employer]): CLICK HERE     Location: [email or web address]    [address] [place] [address]    FAX: [phone]

## 2023-09-14 ENCOUNTER — PATIENT MESSAGE (OUTPATIENT)
Dept: CARDIOLOGY | Facility: HOSPITAL | Age: 68
End: 2023-09-14
Payer: COMMERCIAL

## 2024-02-27 ENCOUNTER — PATIENT MESSAGE (OUTPATIENT)
Dept: FAMILY MEDICINE | Facility: CLINIC | Age: 69
End: 2024-02-27
Payer: COMMERCIAL

## 2024-03-06 ENCOUNTER — OFFICE VISIT (OUTPATIENT)
Dept: FAMILY MEDICINE | Facility: CLINIC | Age: 69
End: 2024-03-06
Payer: COMMERCIAL

## 2024-03-06 ENCOUNTER — PATIENT MESSAGE (OUTPATIENT)
Dept: FAMILY MEDICINE | Facility: CLINIC | Age: 69
End: 2024-03-06

## 2024-03-06 VITALS
HEART RATE: 85 BPM | WEIGHT: 231.5 LBS | BODY MASS INDEX: 31.36 KG/M2 | DIASTOLIC BLOOD PRESSURE: 72 MMHG | HEIGHT: 72 IN | SYSTOLIC BLOOD PRESSURE: 138 MMHG | OXYGEN SATURATION: 97 %

## 2024-03-06 DIAGNOSIS — C61 PROSTATE CANCER: ICD-10-CM

## 2024-03-06 DIAGNOSIS — Z12.5 ENCOUNTER FOR PROSTATE CANCER SCREENING: ICD-10-CM

## 2024-03-06 DIAGNOSIS — Z13.220 ENCOUNTER FOR LIPID SCREENING FOR CARDIOVASCULAR DISEASE: ICD-10-CM

## 2024-03-06 DIAGNOSIS — E88.819 INSULIN RESISTANCE: ICD-10-CM

## 2024-03-06 DIAGNOSIS — Z00.00 WELL ADULT EXAM: Primary | ICD-10-CM

## 2024-03-06 DIAGNOSIS — Z13.6 ENCOUNTER FOR LIPID SCREENING FOR CARDIOVASCULAR DISEASE: ICD-10-CM

## 2024-03-06 DIAGNOSIS — E56.9 VITAMIN DEFICIENCY: ICD-10-CM

## 2024-03-06 DIAGNOSIS — Z79.899 ENCOUNTER FOR LONG-TERM (CURRENT) USE OF MEDICATIONS: ICD-10-CM

## 2024-03-06 DIAGNOSIS — A49.9 BACTERIAL INFECTION: ICD-10-CM

## 2024-03-06 DIAGNOSIS — Z13.6 SCREENING FOR CARDIOVASCULAR CONDITION: ICD-10-CM

## 2024-03-06 PROCEDURE — 1159F MED LIST DOCD IN RCRD: CPT | Mod: CPTII,S$GLB,, | Performed by: FAMILY MEDICINE

## 2024-03-06 PROCEDURE — 1126F AMNT PAIN NOTED NONE PRSNT: CPT | Mod: CPTII,S$GLB,, | Performed by: FAMILY MEDICINE

## 2024-03-06 PROCEDURE — 3008F BODY MASS INDEX DOCD: CPT | Mod: CPTII,S$GLB,, | Performed by: FAMILY MEDICINE

## 2024-03-06 PROCEDURE — 99397 PER PM REEVAL EST PAT 65+ YR: CPT | Mod: S$GLB,,, | Performed by: FAMILY MEDICINE

## 2024-03-06 PROCEDURE — 3075F SYST BP GE 130 - 139MM HG: CPT | Mod: CPTII,S$GLB,, | Performed by: FAMILY MEDICINE

## 2024-03-06 PROCEDURE — 1160F RVW MEDS BY RX/DR IN RCRD: CPT | Mod: CPTII,S$GLB,, | Performed by: FAMILY MEDICINE

## 2024-03-06 PROCEDURE — 3288F FALL RISK ASSESSMENT DOCD: CPT | Mod: CPTII,S$GLB,, | Performed by: FAMILY MEDICINE

## 2024-03-06 PROCEDURE — 3078F DIAST BP <80 MM HG: CPT | Mod: CPTII,S$GLB,, | Performed by: FAMILY MEDICINE

## 2024-03-06 PROCEDURE — 1101F PT FALLS ASSESS-DOCD LE1/YR: CPT | Mod: CPTII,S$GLB,, | Performed by: FAMILY MEDICINE

## 2024-03-06 PROCEDURE — 99999 PR PBB SHADOW E&M-EST. PATIENT-LVL IV: CPT | Mod: PBBFAC,,, | Performed by: FAMILY MEDICINE

## 2024-03-06 RX ORDER — METHYLPREDNISOLONE 4 MG/1
TABLET ORAL
Qty: 21 TABLET | Refills: 0 | Status: SHIPPED | OUTPATIENT
Start: 2024-03-06

## 2024-03-06 RX ORDER — SEMAGLUTIDE 0.68 MG/ML
0.5 INJECTION, SOLUTION SUBCUTANEOUS WEEKLY
Qty: 9 ML | Refills: 1 | Status: SHIPPED | OUTPATIENT
Start: 2024-03-06

## 2024-03-06 NOTE — PATIENT INSTRUCTIONS
Follow up in about 1 year (around 3/6/2025), or if symptoms worsen or fail to improve, for Annual Wellness Exam.     Dear patient,   As a result of recent federal legislation (The Federal Cures Act), you may receive lab or pathology results from your visit in your [facility] account before your physician is able to contact you. Your physician or their representative will relay the results to you with their recommendations at their soonest availability.     If no improvement in symptoms or symptoms worsen, please be advised to call MD, follow-up at clinic and/or go to ER if becomes severe.    Dustin Reyes M.D.        We Offer TELEHEALTH & Same Day Appointments!   Book your Telehealth appointment with me through my nurse or   Clinic appointments on [employer]!    [address] [place] [address]    Office: [phone]   FAX: [phone]    Check out my Facebook Page and Follow Me at: [email or web address]    Check out my website at [employer] by clicking on: [email or web address]    To Schedule appointments online, go to [employer]: [email or web address]

## 2024-03-06 NOTE — PROGRESS NOTES
This note is specifically for wellness visit performed today.     WELLNESS EXAM      Patient ID: Blayne Mendoza is a 68 y.o. male.  has a past medical history of Allergy, Basal cell carcinoma, Cancer, Colon polyp, Erosive gastritis (6/25/2020), and Insulin resistance (7/28/2021).     Chief Complaint:  Encounter for wellness exam    Well Adult Physical: Patient here for a comprehensive physical exam.The patient reports chronic problems.  The patient's last visit with me was on 7/28/2023.    March 2024:  History of Present Illness  The patient presents for annual wellness visit. The patient needs repeat EKG. He is due for carotid ultrasound and blood work.    At some point, there was a discussion about an enlarged heart. He inquired if it was something to be discovered in his records or if it was just to do the EKG to make sure that it was not. He questioned the EKG that was ordered because the technician that did it, he has a real hairy chest and they put the probe on top of the hair and it was not connected directly to his skin. He never did the echocardiogram.    He is still on Ozempic 0.5 mg. He is not losing weight. He has an appointment at [facility] and is hiring a . He denies any side effects from the Ozempic. He used to be regular in the morning with bowel movements. He thinks he is losing muscle.   He tested positive for influenza A in 01/2024.  He had a rough course but is improving overall.  Still having lingering coughing congestion.       Patient has seen PodiatryAndrhannah Delgado DPM recently for plantar fasciitis.  He is currently in physical therapy for this.  Reports improvement.    July 2023: Patient doing well.  He has been off of Ozempic.  Insurance was giving him issues with getting the medication however it was actually approved until 2026 per insurance.  Patient did have issue with obtaining it from the local pharmacy but they eventually filled the medication for him.      Patient is concerned about carotid artery stenosis.  Patient had a friend who had a stroke from 90% blockage.  Patient has not had any recent screenings for cardiovascular health other than life insurance policy in the past.  He is due for updated EKG, ultrasound of the carotid and ultrasound of the abdominal aortic aneurysm screening.     Patient also having some right hand pain mainly in the 5th digit.  Patient denies any trauma or injury.    BMI Readings from Last 10 Encounters:   03/06/24 31.40 kg/m²   07/28/23 32.02 kg/m²   02/03/23 32.29 kg/m²   01/04/23 32.29 kg/m²   11/21/22 31.87 kg/m²   09/12/22 31.99 kg/m²   07/28/22 32.24 kg/m²   07/28/21 32.33 kg/m²   07/26/21 32.25 kg/m²   10/08/20 31.19 kg/m²           Reviewed Care team:Previous PCP: Dr. Christopher Méndez  Urology Dr. Jorgensen- prostate cancer removal       Latest Reference Range & Units 01/28/10 08:12 08/02/10 08:43 12/13/11 11:42 01/03/13 16:10 12/09/14 09:50 12/15/15 15:05 08/20/18 08:48 02/01/19 08:48 01/28/21 08:47 07/28/21 08:46   PSA, Screen 0.00 - 4.00 ng/mL 0.01 0.03 0.04 0.02 0.02 0.04    0.02   PSA Diagnostic 0.00 - 4.00 ng/mL       0.03  0.02    PSA Total 0.00 - 4.00 ng/mL        0.01 0.01    PSA, Free 0.01 - 1.50 ng/mL        0.01 0.01    PSA, Free % Not established %        100.00 100.00        July 2022:  It is been one year since our last visit.  Patient reports that he is feeling well.  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    Currently having the need for prophylactic treatment for bacterial infection.  Requesting refills.  He reports that he has been doing well on Ozempic.  He has not been taking the medication regularly however.  Weight has been stable.      Patient denies any history of thyroid cancer, pancreatitis, MEN syndrome.     Diabetes Management Status    Statin: Not taking  ACE/ARB: Not taking    Screening or Prevention Patient's value Goal Complete/Controlled?  "  HgA1C Testing and Control   Lab Results   Component Value Date    HGBA1C 5.5 07/28/2023      Annually/Less than 8% Yes   Lipid profile : 07/28/2023 Annually Yes   LDL control Lab Results   Component Value Date    LDLCALC 146.8 07/28/2023    Annually/Less than 100 mg/dl  No   Nephropathy screening No results found for: "LABMICR"  Lab Results   Component Value Date    PROTEINUA Negative 07/28/2023     No results found for: "UTPCR"   Annually Yes   Blood pressure BP Readings from Last 1 Encounters:   03/06/24 138/72    Less than 140/90 Yes   Dilated retinal exam Most Recent Eye Exam Date: Not Found Annually No   Foot exam   Most Recent Foot Exam Date: Not Found Annually No         Hyperlipidemia:  Patient was recently started on statin but never took the medication by Dr. WYATT.  Patient reports that he would like to try diet and exercise to reduce his cholesterol levels.      CHRONIC.  Uncontrolled LDL. Lab analysis reviewed.   (-) CP, SOB, abdominal pain, N/V/D, constipation, jaundice, skin changes.  (-) Myalgias  Lab Results   Component Value Date    CHOL 226 (H) 07/28/2023    CHOL 232 (H) 07/28/2022    CHOL 227 (H) 07/28/2021     Lab Results   Component Value Date    HDL 46 07/28/2023    HDL 43 07/28/2022    HDL 47 07/28/2021     Lab Results   Component Value Date    LDLCALC 146.8 07/28/2023    LDLCALC 161.8 (H) 07/28/2022    LDLCALC 157.6 07/28/2021     Lab Results   Component Value Date    TRIG 166 (H) 07/28/2023    TRIG 136 07/28/2022    TRIG 112 07/28/2021     Lab Results   Component Value Date    CHOLHDL 20.4 07/28/2023    CHOLHDL 18.5 (L) 07/28/2022    CHOLHDL 20.7 07/28/2021     Lab Results   Component Value Date    TOTALCHOLEST 4.9 07/28/2023    TOTALCHOLEST 5.4 (H) 07/28/2022    TOTALCHOLEST 4.8 07/28/2021     Lab Results   Component Value Date    ALT 26 07/28/2023    AST 19 07/28/2023    ALKPHOS 79 07/28/2023    BILITOT 0.6 07/28/2023     ======================================================  The " 10-year ASCVD risk score (Jesus CHILDERS, et al., 2019) is: 21.1%    Values used to calculate the score:      Age: 68 years      Sex: Male      Is Non- : No      Diabetic: No      Tobacco smoker: No      Systolic Blood Pressure: 138 mmHg      Is BP treated: No      HDL Cholesterol: 46 mg/dL      Total Cholesterol: 226 mg/dL      Do you take any herbs or supplements that were not prescribed by a doctor? no   Are you taking calcium supplements? no      History:  History of prostate cancer detected by physical by Dr. Méndez and removed  by radical prostatectomy in 2008 per record I do not have a copy of the op report..  PSA has been very low.  UROLOGIST: Kal Sprague MD  Date last PSA:  See diagnostic PSA above.  Lab Results   Component Value Date    PSA 0.02 07/28/2021    PSA 0.04 12/15/2015    PSA 0.02 12/09/2014      Colon cancer screening:Findings:        The perianal and digital rectal examinations were normal.        A 1 mm polyp was found in the descending colon. The polyp was        sessile. The polyp was removed with a cold biopsy forceps. Resection        and retrieval were complete. Estimated blood loss was minimal.        A diffuse area of moderately erythematous mucosa was found in the        rectum and in the sigmoid colon. Biopsies were taken with a cold        forceps for histology. Estimated blood loss was minimal. this is        suspicious for ischemic colitis.        A few small-mouthed diverticula were found in the sigmoid colon.        The anus, descending colon, transverse colon, ascending colon,        cecum, appendiceal orifice, ileocecal valve and ileum appeared        normal.        A 1 mm polyp was found in the descending colon. The polyp was        sessile. The polyp was removed with a cold biopsy forceps. Resection        and retrieval were complete. Estimated blood loss was minimal.   Impression:           - One 1 mm polyp in the descending colon, removed                          with a cold biopsy forceps. Resected and retrieved.                         - Erythematous mucosa in the rectum and in the                         sigmoid colon. Biopsied.                         - Diverticulosis in the sigmoid colon.                         - The anus, descending colon, transverse colon,                         ascending colon, cecum, appendiceal orifice,                         ileocecal valve and terminal ileum are normal.                         - One 1 mm polyp in the descending colon, removed                         with a cold biopsy forceps. Resected and retrieved.   Recommendation:       - Patient has a contact number available for                         emergencies. The signs and symptoms of potential                         delayed complications were discussed with the                         patient. Return to normal activities tomorrow.                         Written discharge instructions were provided to the                         patient.                         - Resume previous diet.                         - Continue present medications.                         - No aspirin, ibuprofen, naproxen, or other                         non-steroidal anti-inflammatory drugs.                         - Return to my office in 2 weeks.                         - Repeat colonoscopy in 5 years for surveillance.                         - Discharge patient to home (via wheelchair).   Rod Rayo MD   6/25/2020 1:59:57 PM     Health Maintenance Topics with due status: Not Due       Topic Last Completion Date    TETANUS VACCINE 11/25/2015    Colorectal Cancer Screening 06/25/2020    Lipid Panel 07/28/2023    Hemoglobin A1c (Diabetic Prevention Screening) 12/07/2023        ==============================================  History reviewed.     Health Maintenance Due   Topic Date Due    RSV Vaccine (Age 60+ and Pregnant patients) (1 - 1-dose 60+ series) Never done    Abdominal Aortic  Aneurysm Screening  Never done    Influenza Vaccine (1) 09/01/2023    COVID-19 Vaccine (5 - 2023-24 season) 09/01/2023       Past Medical History:  Past Medical History:   Diagnosis Date    Allergy     Basal cell carcinoma     Cancer     Prostate    Colon polyp     Erosive gastritis 6/25/2020    Insulin resistance 7/28/2021     Past Surgical History:   Procedure Laterality Date    ADENOIDECTOMY  1966    Tonsils and adenoids removed    COLONOSCOPY N/A 12/1/2015    Procedure: COLONOSCOPY;  Surgeon: Rod Rayo MD;  Location: [facility];  Service: Endoscopy;  Laterality: N/A;    COLONOSCOPY N/A 6/25/2020    Procedure: COLONOSCOPY;  Surgeon: Rod Rayo MD;  Location: [facility];  Service: Endoscopy;  Laterality: N/A;    ESOPHAGOGASTRODUODENOSCOPY N/A 6/25/2020    Procedure: ESOPHAGOGASTRODUODENOSCOPY (EGD);  Surgeon: Rod Rayo MD;  Location: [facility];  Service: Endoscopy;  Laterality: N/A;    PROSTATE SURGERY  2008    radical prostatectomy    TONSILLECTOMY      Child    VASECTOMY       Review of patient's allergies indicates:   Allergen Reactions    Penicillins      Other reaction(s): Swelling    Sulfa (sulfonamide antibiotics)      Other reaction(s): Rash    Bactrim  [sulfamethoxazole-trimethoprim] Rash     Current Outpatient Medications on File Prior to Visit   Medication Sig Dispense Refill    hydrOXYzine HCL (ATARAX) 25 MG tablet Take 1/2 to 1 tablet for itching as needed. **MAY CAUSE DROWSINESS** 60 tablet 11    ketoconazole (NIZORAL) 2 % shampoo       methylPREDNISolone (MEDROL DOSEPACK) 4 mg tablet follow package directions 21 tablet 0    montelukast (SINGULAIR) 10 mg tablet TAKE 1 TABLET BY MOUTH EVERY EVENING 90 tablet 3    OZEMPIC 0.25 mg or 0.5 mg (2 mg/3 mL) pen injector Inject 0.5 mg into the skin every 7 days. 9 mL 1    [DISCONTINUED] dexbrompheniramn-chlophedianol (CHLO HIST) 1-12.5 mg/5 mL Soln Take 10 mLs by mouth.       No current facility-administered medications on file prior to  visit.     Social History     Socioeconomic History    Marital status:    Tobacco Use    Smoking status: Former     Current packs/day: 0.00     Average packs/day: 0.3 packs/day for 5.0 years (1.3 ttl pk-yrs)     Types: Cigarettes, Cigars     Start date: 1977     Quit date: 1982     Years since quittin.2    Smokeless tobacco: Never    Tobacco comments:     Still crave one at times.  Makes me sick for days when I have one   Substance and Sexual Activity    Alcohol use: Yes     Alcohol/week: 6.0 standard drinks of alcohol     Types: 6 Drinks containing 0.5 oz of alcohol per week     Comment: one a day    Drug use: No    Sexual activity: Not Currently     Partners: Female     Comment: My prostate removal has cause nerve damage-ED-must fix!     Social Determinants of Health     Financial Resource Strain: Low Risk  (3/4/2024)    Overall Financial Resource Strain (CARDIA)     Difficulty of Paying Living Expenses: Not hard at all   Food Insecurity: No Food Insecurity (3/4/2024)    Hunger Vital Sign     Worried About Running Out of Food in the Last Year: Never true     Ran Out of Food in the Last Year: Never true   Transportation Needs: No Transportation Needs (3/4/2024)    PRAPARE - Transportation     Lack of Transportation (Medical): No     Lack of Transportation (Non-Medical): No   Physical Activity: Insufficiently Active (3/4/2024)    Exercise Vital Sign     Days of Exercise per Week: 2 days     Minutes of Exercise per Session: 70 min   Stress: No Stress Concern Present (3/4/2024)    [employer] - Occupational Stress Questionnaire     Feeling of Stress : Only a little   Social Connections: Unknown (3/4/2024)    Social Connection and Isolation Panel [NHANES]     Frequency of Communication with Friends and Family: More than three times a week     Frequency of Social Gatherings with Friends and Family: Three times a week     Active Member of Clubs or Organizations: Yes      Attends Club or Organization Meetings: More than 4 times per year     Marital Status:    Housing Stability: Low Risk  (3/4/2024)    Housing Stability Vital Sign     Unable to Pay for Housing in the Last Year: No     Number of Places Lived in the Last Year: 1     Unstable Housing in the Last Year: No     Family History   Problem Relation Age of Onset    Cancer Mother         Ovarian Cancer-       Vitals:    24 1459   BP: 138/72   Pulse: 85      Body mass index is 31.4 kg/m².     Review of Systems   Constitutional:  Negative for activity change and unexpected weight change.   HENT:  Positive for hearing loss. Negative for rhinorrhea and trouble swallowing.    Eyes:  Negative for discharge and visual disturbance.   Respiratory:  Negative for chest tightness and wheezing.    Cardiovascular:  Negative for chest pain and palpitations.   Gastrointestinal:  Negative for blood in stool, constipation, diarrhea and vomiting.   Endocrine: Negative for polydipsia and polyuria.   Genitourinary:  Negative for difficulty urinating, hematuria and urgency.   Musculoskeletal:  Positive for arthralgias. Negative for joint swelling and neck pain.   Neurological:  Negative for weakness and headaches.   Psychiatric/Behavioral:  Negative for confusion and dysphoric mood.           Objective:      Physical Exam  Vitals and nursing note reviewed.   Constitutional:       General: He is not in acute distress.     Appearance: He is well-developed. He is not diaphoretic.   HENT:      Head: Normocephalic and atraumatic.      Right Ear: External ear normal.      Left Ear: External ear normal.      Nose: Nose normal. No rhinorrhea.   Eyes:      Extraocular Movements: Extraocular movements intact.      Pupils: Pupils are equal, round, and reactive to light.   Neck:      Vascular: No carotid bruit.   Cardiovascular:      Rate and Rhythm: Normal rate and regular rhythm.      Pulses: Normal pulses.      Heart sounds: Normal heart  sounds. No murmur heard.  Pulmonary:      Effort: Pulmonary effort is normal. No respiratory distress.      Breath sounds: Normal breath sounds. No wheezing.   Abdominal:      General: Bowel sounds are normal.      Palpations: Abdomen is soft.   Musculoskeletal:         General: Normal range of motion.      Cervical back: Normal range of motion and neck supple.   Lymphadenopathy:      Cervical: No cervical adenopathy.   Skin:     General: Skin is warm and dry.      Capillary Refill: Capillary refill takes less than 2 seconds.      Findings: No rash.   Neurological:      General: No focal deficit present.      Mental Status: He is alert and oriented to person, place, and time. Mental status is at baseline.      Cranial Nerves: No cranial nerve deficit.      Motor: No weakness.      Gait: Gait normal.   Psychiatric:         Attention and Perception: He is attentive.         Mood and Affect: Mood normal. Mood is not anxious or depressed. Affect is not labile, blunt, angry or inappropriate.         Speech: He is communicative. Speech is not rapid and pressured, delayed, slurred or tangential.         Behavior: Behavior normal. Behavior is not agitated, slowed, aggressive, withdrawn, hyperactive or combative.         Thought Content: Thought content normal. Thought content is not paranoid or delusional. Thought content does not include homicidal or suicidal ideation. Thought content does not include homicidal or suicidal plan.         Cognition and Memory: Memory is not impaired.         Judgment: Judgment normal. Judgment is not impulsive or inappropriate.       BMI Readings from Last 10 Encounters:   03/06/24 31.40 kg/m²   07/28/23 32.02 kg/m²   02/03/23 32.29 kg/m²   01/04/23 32.29 kg/m²   11/21/22 31.87 kg/m²   09/12/22 31.99 kg/m²   07/28/22 32.24 kg/m²   07/28/21 32.33 kg/m²   07/26/21 32.25 kg/m²   10/08/20 31.19 kg/m²       X-Ray Hand Complete Right  Narrative: EXAMINATION:  XR HAND COMPLETE 3 VIEW  RIGHT    CLINICAL HISTORY:  Pain in right finger(s)    TECHNIQUE:  PA, lateral, and oblique views of the right hand were performed.    COMPARISON:  None    FINDINGS:  No acute fracture or dislocation.  Mild degenerative changes involving the thumb interphalangeal joint.  Impression: As above.    Electronically signed by: Elliott Melissa  Date:    07/28/2023  Time:    09:23    Assessment / Plan:      1.  Routine health exam-patient here for annual wellness exam.  Labs ordered.  Health maintenance was reviewed and ordered.    Assessment & Plan  1. Abnormal EKG.  I will do an EKG today.  EKG is stable.  No bundle branch block on this EKG.  Possible LVH.  Echocardiogram pending.    2. Health maintenance.  He is due for carotid ultrasound and blood work. I will schedule an ultrasound of the abdominal aorta. I will recheck his cholesterol. I will refill his Medrol Dosepak. He will receive his influenza vaccine soon.  Proceed with vaccinations.    3. Constipation.  He will add a stool softener when he takes Ozempic.  Please be advised constipation condition course.  I recommend increase daily water intake to an acceptable level.  Increase fiber.  Recommend stool softener and laxative if needed.  Goal of 1 bowel movement daily.  Follow-up to clinic or notify me if no improvement or symptoms are persistent or worsening.  ER precautions were given.  Recommend daily exercise as tolerated, adequate water intake (six 8-oz glasses of water daily), and high fiber diet. OTC fiber supplements are recommended if diet does not reach daily fiber goal (20-30 grams daily), such as Metamucil, Citrucel, or FiberCon (take as directed, separate from other oral medications by >2 hours).  - CONTINUE OTC stool softener such as Colace as directed to avoid hard stools and straining with bowel movements PRN  -If still no improvement with these measures, call/follow-up    Check full panel of vitamin deficiency labs including vitamin-D.  Chronic. Vit  "d deficiency. Takes vitamin d supplement. No SE reported. Fatigue is slightly improved.   No results found for: "GCDTSJXM76GN"   No results found for: "UIBC", "IRON", "TRANS", "TRANSFERRIN", "TIBC", "LABIRON", "FESATURATED"   No results found for: "[id number]"  No results found for: "FOLATE"  Lab Results   Component Value Date    WBC 5.95 07/28/2023    HGB 14.8 07/28/2023    HCT 44.9 07/28/2023    MCV 95 07/28/2023     07/28/2023            - risk of corticosteroids reviewed (elevated BP/glucose, insomnia, psychosis, bone loss, etc) and patient expressed understanding      Previous plan:  Right finger pain:  Check x-ray start Voltaren gel topically.  Trial of occupational therapy.  If no improvement recommend orthopedic hand consult.    Insulin resistance:  GLP 1 risk and benefits and common side effects of medication discussed with the patient at length.  All questions were answered.  Discussed with patient at length about potential pharmacologic options.  Patient desires consideration for Ozempic .  The risks, benefits and, side effects of this GLP 1 medication including nausea , constipation, diarrhea and pain injection site, etcetera.   Patient reports no personal or family history of pancreatitis, MEN or medullary thyroid cancer.  There is potential for gallbladder issues while taking this medication if not already removed.  Patient should be advised to caution with taking this medication if having history of thyroid cancer or biliary disease/gallstones.  Patient should be aware of risk of diabetic retinopathy if blood sugars lowered too quickly.  Patient is aware that weight loss can and will most likely occur quickly.  Discussed GLP 1 mechanism of action.    Hyperlipidemia:  Patient will continue lifestyle modification and increasing Ozempic dosage for weight loss.  Previously  Trial of diet and exercise.  Patient does have elevated ASCVD score however he does not want to take statin medication.  Will " continue to monitor and recheck lipid panel in six months.Counseled on hyperlipidemia disease course, healthy diet and increased need for exercise.  Please be advised of the risk of cardiovascular disease, increase stroke and heart attack risk with uncontrolled/untreated hyperlipidemia.     Patient voiced understanding and understood the treatment plan. All questions were answered.     History of prostate cancer:  Check diagnostic PSA yearly.  Insomnia/itching:  Continue hydroxyzine 25 milligrams at bedtime.Discussed insomnia condition course.  Advised of first-line medications for this condition.  Also discussed sleep hygiene.  Information was given below.  Good sleep habits (sometimes referred to as sleep hygiene) can help you get a good nights sleep.    Some habits that can improve your sleep health:  -Be consistent. Go to bed at the same time each night and get up at the same time each morning, including on the weekends  -Make sure your bedroom is quiet, dark, relaxing, and at a comfortable temperature  -Remove electronic devices, such as TVs, computers, and smart phones, from the bedroom  -Avoid large meals, caffeine, and alcohol before bedtime  -Get some exercise. Being physically active during the day can help you fall asleep more easily at night.    Weight gain/insulin resistance:  Continue Ozempic.  We will plan to monitor hemoglobin A1c at designated intervals 3 to 6 months.  I recommend ongoing Education for diabetic diet and exercise protocol.  We will continue to monitor for side effects.    Please be advised of symptoms to monitor for and to notify me immediately if persistent or worsening.  Follow up with Ophthalmology/Optometry and Podiatry at least annually.    Denies history of pancreatitis, thyroid cancer, MEN syndrome.    Hearing loss:  Follow-up with audiology.    Complete history and physical was completed today.  Complete and thorough medication reconciliation was performed.  Discussed  risks and benefits of medications.  Advised patient on orders and health maintenance.  We discussed old records and old labs if available.  Will request any records not available through epic.  Continue current medications listed on your summary sheet.    All questions were answered. Patient had no further concerns. Advised of diagnoses and plan. Follow up as planned or return sooner if symptoms persist or worsen.     Orders Placed This Encounter   Procedures    CBC Without Differential     Standing Status:   Future     Standing Expiration Date:   5/5/2025    Comprehensive Metabolic Panel     Standing Status:   Future     Standing Expiration Date:   5/5/2025    TSH     Standing Status:   Future     Standing Expiration Date:   5/5/2025    Hemoglobin A1C     Standing Status:   Future     Standing Expiration Date:   5/5/2025    Lipid Panel     Standing Status:   Future     Standing Expiration Date:   5/5/2025    PSA, Screening     Standing Status:   Future     Standing Expiration Date:   6/4/2025    IN OFFICE EKG 12-LEAD (to [place])     Standing Status:   Future     Standing Expiration Date:   3/6/2025             Future Appointments       Date Provider Specialty Appt Notes    3/8/2024  Radiology     7/2/2024 Dustin Reyes MD Family Medicine Annual            Dustin Reyes MD

## 2024-03-11 ENCOUNTER — HOSPITAL ENCOUNTER (OUTPATIENT)
Dept: CARDIOLOGY | Facility: HOSPITAL | Age: 69
Discharge: HOME | End: 2024-03-11
Attending: FAMILY MEDICINE
Payer: COMMERCIAL

## 2024-03-11 ENCOUNTER — CLINICAL SUPPORT (OUTPATIENT)
Dept: FAMILY MEDICINE | Facility: CLINIC | Age: 69
End: 2024-03-11
Payer: COMMERCIAL

## 2024-03-11 ENCOUNTER — HOSPITAL ENCOUNTER (OUTPATIENT)
Dept: RADIOLOGY | Facility: HOSPITAL | Age: 69
Discharge: HOME | End: 2024-03-11
Attending: FAMILY MEDICINE
Payer: COMMERCIAL

## 2024-03-11 ENCOUNTER — PATIENT MESSAGE (OUTPATIENT)
Dept: FAMILY MEDICINE | Facility: CLINIC | Age: 69
End: 2024-03-11

## 2024-03-11 VITALS
BODY MASS INDEX: 31.29 KG/M2 | DIASTOLIC BLOOD PRESSURE: 72 MMHG | WEIGHT: 231 LBS | HEART RATE: 70 BPM | SYSTOLIC BLOOD PRESSURE: 138 MMHG | HEIGHT: 72 IN

## 2024-03-11 DIAGNOSIS — R94.31 ABNORMAL EKG: ICD-10-CM

## 2024-03-11 DIAGNOSIS — Z13.6 SCREENING FOR AAA (ABDOMINAL AORTIC ANEURYSM): ICD-10-CM

## 2024-03-11 DIAGNOSIS — Z23 NEED FOR VACCINATION: Primary | ICD-10-CM

## 2024-03-11 DIAGNOSIS — R09.89 CAROTID BRUIT, UNSPECIFIED LATERALITY: ICD-10-CM

## 2024-03-11 LAB
AORTIC ROOT ANNULUS: 2.62 CM
AORTIC VALVE CUSP SEPERATION: 1.78 CM
ASCENDING AORTA: 2.92 CM
AV INDEX (PROSTH): 0.69
AV MEAN GRADIENT: 3 MMHG
AV PEAK GRADIENT: 5 MMHG
AV VALVE AREA BY VELOCITY RATIO: 2.22 CM²
AV VALVE AREA: 2.09 CM²
AV VELOCITY RATIO: 0.73
BSA FOR ECHO PROCEDURE: 2.31 M2
CV ECHO LV RWT: 0.49 CM
DOP CALC AO PEAK VEL: 1.14 M/S
DOP CALC AO VTI: 28.6 CM
DOP CALC LVOT AREA: 3 CM2
DOP CALC LVOT DIAMETER: 1.97 CM
DOP CALC LVOT PEAK VEL: 0.83 M/S
DOP CALC LVOT STROKE VOLUME: 59.71 CM3
DOP CALC MV VTI: 30 CM
DOP CALC RVOT PEAK VEL: 0.77 M/S
DOP CALC RVOT VTI: 16.8 CM
DOP CALCLVOT PEAK VEL VTI: 19.6 CM
E WAVE DECELERATION TIME: 129.39 MSEC
E/A RATIO: 1.64
E/E' RATIO: 10 M/S
ECHO LV POSTERIOR WALL: 1.24 CM (ref 0.6–1.1)
FRACTIONAL SHORTENING: 28 % (ref 28–44)
INTERVENTRICULAR SEPTUM: 0.97 CM (ref 0.6–1.1)
IVC DIAMETER: 1.4 CM
IVRT: 79.92 MSEC
LA MAJOR: 4.68 CM
LA MINOR: 5.16 CM
LA WIDTH: 3.8 CM
LEFT ATRIUM SIZE: 4.26 CM
LEFT ATRIUM VOLUME INDEX MOD: 24.1 ML/M2
LEFT ATRIUM VOLUME INDEX: 29.9 ML/M2
LEFT ATRIUM VOLUME MOD: 54.49 CM3
LEFT ATRIUM VOLUME: 67.54 CM3
LEFT INTERNAL DIMENSION IN SYSTOLE: 3.6 CM (ref 2.1–4)
LEFT VENTRICLE DIASTOLIC VOLUME INDEX: 52.72 ML/M2
LEFT VENTRICLE DIASTOLIC VOLUME: 119.15 ML
LEFT VENTRICLE MASS INDEX: 93 G/M2
LEFT VENTRICLE SYSTOLIC VOLUME INDEX: 24.1 ML/M2
LEFT VENTRICLE SYSTOLIC VOLUME: 54.54 ML
LEFT VENTRICULAR INTERNAL DIMENSION IN DIASTOLE: 5.02 CM (ref 3.5–6)
LEFT VENTRICULAR MASS: 209.79 G
LV LATERAL E/E' RATIO: 9 M/S
LV SEPTAL E/E' RATIO: 11.25 M/S
LVOT MG: 1.61 MMHG
LVOT MV: 0.6 CM/S
MV A" WAVE DURATION": 131.3 MSEC
MV MEAN GRADIENT: 2 MMHG
MV PEAK A VEL: 0.55 M/S
MV PEAK E VEL: 0.9 M/S
MV PEAK GRADIENT: 3 MMHG
MV STENOSIS PRESSURE HALF TIME: 37.52 MS
MV VALVE AREA BY CONTINUITY EQUATION: 1.99 CM2
MV VALVE AREA P 1/2 METHOD: 5.86 CM2
PISA MRMAX VEL: 3.85 M/S
PISA TR MAX VEL: 2.55 M/S
PULM VEIN S/D RATIO: 1.44
PV MEAN GRADIENT: 1 MMHG
PV MV: 0.6 M/S
PV PEAK D VEL: 0.39 M/S
PV PEAK GRADIENT: 3 MMHG
PV PEAK S VEL: 0.56 M/S
PV PEAK VELOCITY: 0.82 M/S
RA MAJOR: 4.45 CM
RA PRESSURE ESTIMATED: 3 MMHG
RA WIDTH: 3.36 CM
RIGHT VENTRICULAR END-DIASTOLIC DIMENSION: 2.99 CM
RV TB RVSP: 6 MMHG
STJ: 2.87 CM
TDI LATERAL: 0.1 M/S
TDI SEPTAL: 0.08 M/S
TDI: 0.09 M/S
TR MAX PG: 26 MMHG
TRICUSPID ANNULAR PLANE SYSTOLIC EXCURSION: 2.59 CM
TV REST PULMONARY ARTERY PRESSURE: 29 MMHG
Z-SCORE OF LEFT VENTRICULAR DIMENSION IN END DIASTOLE: -5.02
Z-SCORE OF LEFT VENTRICULAR DIMENSION IN END SYSTOLE: -2.62

## 2024-03-11 PROCEDURE — 93306 TTE W/DOPPLER COMPLETE: CPT | Mod: 26,,, | Performed by: INTERNAL MEDICINE

## 2024-03-11 PROCEDURE — 99999 PR PBB SHADOW E&M-EST. PATIENT-LVL I: CPT | Mod: PBBFAC,,,

## 2024-03-11 PROCEDURE — 93306 TTE W/DOPPLER COMPLETE: CPT | Mod: PO

## 2024-03-11 PROCEDURE — 90694 VACC AIIV4 NO PRSRV 0.5ML IM: CPT | Mod: S$GLB,,, | Performed by: FAMILY MEDICINE

## 2024-03-11 PROCEDURE — 76706 US ABDL AORTA SCREEN AAA: CPT | Mod: TC,PO

## 2024-03-11 PROCEDURE — 93880 EXTRACRANIAL BILAT STUDY: CPT | Mod: 26,,, | Performed by: STUDENT IN AN ORGANIZED HEALTH CARE EDUCATION/TRAINING PROGRAM

## 2024-03-11 PROCEDURE — 93880 EXTRACRANIAL BILAT STUDY: CPT | Mod: TC,PO

## 2024-03-11 PROCEDURE — 76706 US ABDL AORTA SCREEN AAA: CPT | Mod: 26,,, | Performed by: STUDENT IN AN ORGANIZED HEALTH CARE EDUCATION/TRAINING PROGRAM

## 2024-03-11 PROCEDURE — 90471 IMMUNIZATION ADMIN: CPT | Mod: S$GLB,,, | Performed by: FAMILY MEDICINE

## 2024-03-11 NOTE — PROGRESS NOTES
1st check to see if patient has seen the results.  If not then  CALL patient with results and Document verification.  Schedule follow-up if needed.  [phone]  Echocardiogram has reviewed by Cardiology.  There is mild wall thickness of the heart muscle.  The ejection fraction is within normal limits.  Mitral valve shows mild leakage which is not overly concerning.    Overall no significant concern and mostly within normal limits.  Continue current medications and monitoring blood pressure for less than 140/90.  Proceed with lifestyle modification diet and exercise.

## 2024-03-11 NOTE — PROGRESS NOTES
1st check to see if patient has seen the results.  If not then  CALL patient with results and Document verification.  Schedule follow-up if needed.  [phone]  Ultrasound of the abdominal aorta screening is negative for aneurysm.

## 2024-03-11 NOTE — PROGRESS NOTES
1st check to see if patient has seen the results.  If not then  CALL patient with results and Document verification.  Schedule follow-up if needed.  [phone]  Ultrasound of the carotid arteries bilaterally reviewed by radiology.  There is 50 to 69% stenosis of the right carotid artery.  The left carotid artery shows no significant blockage.    I recommend repeat ultrasound of the carotid artery in one year for surveillance.

## 2024-05-18 ENCOUNTER — PATIENT MESSAGE (OUTPATIENT)
Dept: FAMILY MEDICINE | Facility: CLINIC | Age: 69
End: 2024-05-18
Payer: COMMERCIAL

## 2024-06-25 ENCOUNTER — OFFICE VISIT (OUTPATIENT)
Dept: FAMILY MEDICINE | Facility: CLINIC | Age: 69
End: 2024-06-25
Payer: COMMERCIAL

## 2024-06-25 VITALS
BODY MASS INDEX: 29.29 KG/M2 | SYSTOLIC BLOOD PRESSURE: 138 MMHG | DIASTOLIC BLOOD PRESSURE: 82 MMHG | WEIGHT: 216 LBS | HEART RATE: 83 BPM | OXYGEN SATURATION: 96 %

## 2024-06-25 DIAGNOSIS — E66.3 OVERWEIGHT WITH BODY MASS INDEX (BMI) OF 29 TO 29.9 IN ADULT: ICD-10-CM

## 2024-06-25 DIAGNOSIS — E88.819 INSULIN RESISTANCE: Chronic | ICD-10-CM

## 2024-06-25 DIAGNOSIS — Z13.220 ENCOUNTER FOR LIPID SCREENING FOR CARDIOVASCULAR DISEASE: ICD-10-CM

## 2024-06-25 DIAGNOSIS — Z13.6 ENCOUNTER FOR LIPID SCREENING FOR CARDIOVASCULAR DISEASE: ICD-10-CM

## 2024-06-25 DIAGNOSIS — A49.9 BACTERIAL INFECTION: Primary | ICD-10-CM

## 2024-06-25 DIAGNOSIS — I65.21 STENOSIS OF RIGHT CAROTID ARTERY: ICD-10-CM

## 2024-06-25 DIAGNOSIS — Z79.899 ENCOUNTER FOR LONG-TERM (CURRENT) USE OF MEDICATIONS: ICD-10-CM

## 2024-06-25 PROCEDURE — 1159F MED LIST DOCD IN RCRD: CPT | Mod: CPTII,S$GLB,, | Performed by: FAMILY MEDICINE

## 2024-06-25 PROCEDURE — G2211 COMPLEX E/M VISIT ADD ON: HCPCS | Mod: S$GLB,,, | Performed by: FAMILY MEDICINE

## 2024-06-25 PROCEDURE — 99214 OFFICE O/P EST MOD 30 MIN: CPT | Mod: S$GLB,,, | Performed by: FAMILY MEDICINE

## 2024-06-25 PROCEDURE — 3008F BODY MASS INDEX DOCD: CPT | Mod: CPTII,S$GLB,, | Performed by: FAMILY MEDICINE

## 2024-06-25 PROCEDURE — 1160F RVW MEDS BY RX/DR IN RCRD: CPT | Mod: CPTII,S$GLB,, | Performed by: FAMILY MEDICINE

## 2024-06-25 PROCEDURE — 3075F SYST BP GE 130 - 139MM HG: CPT | Mod: CPTII,S$GLB,, | Performed by: FAMILY MEDICINE

## 2024-06-25 PROCEDURE — 3079F DIAST BP 80-89 MM HG: CPT | Mod: CPTII,S$GLB,, | Performed by: FAMILY MEDICINE

## 2024-06-25 PROCEDURE — 99999 PR PBB SHADOW E&M-EST. PATIENT-LVL III: CPT | Mod: PBBFAC,,, | Performed by: FAMILY MEDICINE

## 2024-06-25 PROCEDURE — 3044F HG A1C LEVEL LT 7.0%: CPT | Mod: CPTII,S$GLB,, | Performed by: FAMILY MEDICINE

## 2024-06-25 RX ORDER — METHYLPREDNISOLONE 4 MG/1
TABLET ORAL
Qty: 21 TABLET | Refills: 0 | Status: SHIPPED | OUTPATIENT
Start: 2024-06-25

## 2024-06-25 RX ORDER — ALBUTEROL SULFATE 90 UG/1
2 AEROSOL, METERED RESPIRATORY (INHALATION) EVERY 6 HOURS PRN
COMMUNITY
Start: 2024-06-23

## 2024-06-25 RX ORDER — PROMETHAZINE HYDROCHLORIDE AND DEXTROMETHORPHAN HYDROBROMIDE 6.25; 15 MG/5ML; MG/5ML
5 SYRUP ORAL
COMMUNITY
Start: 2024-06-23

## 2024-06-25 RX ORDER — DOXYCYCLINE 100 MG/1
1 TABLET ORAL 2 TIMES DAILY
COMMUNITY
Start: 2024-06-23

## 2024-06-25 RX ORDER — AZITHROMYCIN 250 MG/1
TABLET, FILM COATED ORAL
Qty: 6 TABLET | Refills: 0 | Status: SHIPPED | OUTPATIENT
Start: 2024-06-25 | End: 2024-06-30

## 2024-06-25 RX ORDER — PREDNISONE 20 MG/1
20 TABLET ORAL
COMMUNITY
Start: 2024-06-23 | End: 2024-06-25

## 2024-06-25 NOTE — ASSESSMENT & PLAN NOTE
Start Medrol Dosepak and azithromycin.  Discussed condition course and signs and symptoms to expect.  Patient advised take anti-inflammatories and or Tylenol for pain or fever.  ER precautions.  Call MD or follow-up to clinic if not improving or worsening symptoms.

## 2024-06-25 NOTE — PATIENT INSTRUCTIONS
Follow up in about 1 year (around 6/25/2025), or if symptoms worsen or fail to improve, for Annual Wellness Exam.     Dear patient,   As a result of recent federal legislation (The Federal Cures Act), you may receive lab or pathology results from your visit in your [facility] account before your physician is able to contact you. Your physician or their representative will relay the results to you with their recommendations at their soonest availability.     If no improvement in symptoms or symptoms worsen, please be advised to call MD, follow-up at clinic and/or go to ER if becomes severe.    Dustin Reyes M.D.        We Offer TELEHEALTH & Same Day Appointments!   Book your Telehealth appointment with me through my nurse or   Clinic appointments on [employer]!    [address] [place] [address]    Office: [phone]   FAX: [phone]    Check out my Facebook Page and Follow Me at: [email or web address]    Check out my website at [email or web address] by clicking on: [email or web address]    To Schedule appointments online, go to [email or web address]: [email or web address]

## 2024-06-25 NOTE — PROGRESS NOTES
PLAN:    Assessment & Plan  1. Acute bronchitis.  A prescription for Z-Jeffry and Medrol Dosepak has been issued. The patient has been advised to discontinue prednisone.    - risk of corticosteroids reviewed (elevated BP/glucose, insomnia, psychosis, bone loss, etc) and patient expressed understanding    2. Annual wellness visit.  An ultrasound of the carotid artery bilaterally has been ordered.    Problem List Items Addressed This Visit       Encounter for long-term (current) use of medications (Chronic)     Complete history and physical was completed today.  Complete and thorough medication reconciliation was performed.  Discussed risks and benefits of medications.  Advised patient on orders and health maintenance.  We discussed old records and old labs if available.  Will request any records not available through epic.  Continue current medications listed on your summary sheet.           Insulin resistance (Chronic)     Continue Ozempic 0.5 milligram weekly.  Monitor for weight loss targeting 10 to 15% body weight over the next few months.    We will plan to monitor hemoglobin A1c at designated intervals 3 to 6 months.  I recommend ongoing Education for diabetic diet and exercise protocol.  We will continue to monitor for side effects.    Please be advised of symptoms to monitor for and to notify me immediately if persistent or worsening.  Follow up with Ophthalmology/Optometry and Podiatry at least annually.           Stenosis of right carotid artery (Chronic)     Update ultrasound of the carotid artery for comparison.         Relevant Orders    US Carotid Bilateral    Bacterial infection - Primary     Start Medrol Dosepak and azithromycin.  Discussed condition course and signs and symptoms to expect.  Patient advised take anti-inflammatories and or Tylenol for pain or fever.  ER precautions.  Call MD or follow-up to clinic if not improving or worsening symptoms.           Relevant Medications    methylPREDNISolone  (MEDROL DOSEPACK) 4 mg tablet    azithromycin (Z-NICOLASA) 250 MG tablet    Overweight with body mass index (BMI) of 29 to 29.9 in adult     Improving on Ozempic.  Continue lifestyle modification with diet and exercise.          Other Visit Diagnoses       Encounter for lipid screening for cardiovascular disease                 Medication Management for assessment above:   Medication List with Changes/Refills   New Medications    AZITHROMYCIN (Z-NICOLASA) 250 MG TABLET    Take 2 tablets by mouth on day 1; Take 1 tablet by mouth on days 2-5   Current Medications    ALBUTEROL (PROVENTIL/VENTOLIN HFA) 90 MCG/ACTUATION INHALER    Inhale 2 puffs into the lungs every 6 (six) hours as needed.    DOXYCYCLINE MONOHYDRATE 100 MG TAB    Take 1 tablet by mouth 2 (two) times daily.    HYDROXYZINE HCL (ATARAX) 25 MG TABLET    Take 1/2 to 1 tablet for itching as needed. **MAY CAUSE DROWSINESS**    KETOCONAZOLE (NIZORAL) 2 % SHAMPOO        MONTELUKAST (SINGULAIR) 10 MG TABLET    TAKE 1 TABLET BY MOUTH EVERY EVENING    OZEMPIC 0.25 MG OR 0.5 MG (2 MG/3 ML) PEN INJECTOR    Inject 0.5 mg into the skin once a week.    PROMETHAZINE-DEXTROMETHORPHAN (PROMETHAZINE-DM) 6.25-15 MG/5 ML SYRP    Take 5 mLs by mouth.   Changed and/or Refilled Medications    Modified Medication Previous Medication    METHYLPREDNISOLONE (MEDROL DOSEPACK) 4 MG TABLET methylPREDNISolone (MEDROL DOSEPACK) 4 mg tablet       follow package directions    follow package directions   Discontinued Medications    PREDNISONE (DELTASONE) 20 MG TABLET    Take 20 mg by mouth.       Dustin Reyes M.D.  ==========================================================================  Subjective:   Patient ID: Blayne Mendoza is a 68 y.o. male.  has a past medical history of Allergy, Basal cell carcinoma, Cancer, Colon polyp, Erosive gastritis (6/25/2020), and Insulin resistance (7/28/2021).   Chief Complaint: Medication Refill      History of Present Illness  The patient is a 68-year-old  male here for follow-up for annual wellness.    The patient experienced an upper respiratory infection last week, which necessitated a visit to [facility] either on Saturday or Sunday. He was diagnosed with acute bronchitis, which has since flared up. He experienced a nocturnal cough the previous night. He is scheduled to travel to [place] with his family for the vacation on Sunday. He has previously been treated with prednisone 20 . He has a history of allergies to glasses and pollens and has received Decadron injections for similar symptoms.    The patient underwent an ultrasound in 03/2024.    Problem List Items Addressed This Visit       Encounter for long-term (current) use of medications (Chronic)    Overview     June 2024:  Reviewed labs.  November 2022: Reviewed labs.  CHRONIC. Stable. Compliant with medications for managed conditions. See medication list. No SE reported.   Routine lab analysis is being monitored. Refills were addressed.  Lab Results   Component Value Date    WBC 5.46 03/11/2024    HGB 14.9 03/11/2024    HCT 45.7 03/11/2024    MCV 96 03/11/2024     03/11/2024         Chemistry        Component Value Date/Time     03/11/2024 0900    K 4.6 03/11/2024 0900     03/11/2024 0900    CO2 25 03/11/2024 0900    BUN 18 03/11/2024 0900    CREATININE 0.9 03/11/2024 0900     03/11/2024 0900        Component Value Date/Time    CALCIUM 9.6 03/11/2024 0900    ALKPHOS 73 03/11/2024 0900    AST 20 03/11/2024 0900    ALT 20 03/11/2024 0900    BILITOT 0.5 03/11/2024 0900    ESTGFRAFRICA >60.0 07/28/2022 0828    EGFRNONAA >60.0 07/28/2022 0828          Lab Results   Component Value Date    TSH 1.787 03/11/2024              Current Assessment & Plan     Complete history and physical was completed today.  Complete and thorough medication reconciliation was performed.  Discussed risks and benefits of medications.  Advised patient on orders and health maintenance.  We  "discussed old records and old labs if available.  Will request any records not available through epic.  Continue current medications listed on your summary sheet.           Insulin resistance (Chronic)    Overview     June 2024:  Doing well on Ozempic.  Patient desires to continue.  November 2022:  Patient tolerating Ozempic well.  However he has been off of it for a few weeks due to recent travel.    BMI Readings from Last 10 Encounters:   03/11/24 31.33 kg/m²   03/06/24 31.40 kg/m²   07/28/23 32.02 kg/m²   02/03/23 32.29 kg/m²   01/04/23 32.29 kg/m²   11/21/22 31.87 kg/m²   09/12/22 31.99 kg/m²   07/28/22 32.24 kg/m²   07/28/21 32.33 kg/m²   07/26/21 32.25 kg/m²   Patient denies any history of thyroid cancer, pancreatitis, MEN syndrome.   Management Status Statin: Not takingACE/ARB: Not taking  Diabetes Management StatusStatin: Not takingACE/ARB: Not taking    Screening or Prevention Patient's value Goal Complete/Controlled?   HgA1C Testing and Control   Lab Results   Component Value Date    HGBA1C 5.5 03/11/2024      Annually/Less than 8% Yes   Lipid profile : 03/11/2024 Annually Yes   LDL control Lab Results   Component Value Date    LDLCALC 146.8 03/11/2024    Annually/Less than 100 mg/dl  No   Nephropathy screening No results found for: "LABMICR"  Lab Results   Component Value Date    PROTEINUA Negative 07/28/2023     No results found for: "UTPCR"   Annually Yes   Blood pressure BP Readings from Last 1 Encounters:   06/25/24 138/82    Less than 140/90 Yes   Dilated retinal exam Most Recent Eye Exam Date: Not Found Annually No   Foot exam   Most Recent Foot Exam Date: Not Found Annually No              Current Assessment & Plan     Continue Ozempic 0.5 milligram weekly.  Monitor for weight loss targeting 10 to 15% body weight over the next few months.    We will plan to monitor hemoglobin A1c at designated intervals 3 to 6 months.  I recommend ongoing Education for diabetic diet and exercise protocol.  We will " continue to monitor for side effects.    Please be advised of symptoms to monitor for and to notify me immediately if persistent or worsening.  Follow up with Ophthalmology/Optometry and Podiatry at least annually.           Stenosis of right carotid artery (Chronic)    Overview     US Carotid Bilateral  Narrative: EXAM:  US CAROTID BILATERAL    CLINICAL HISTORY:  [R09.89]-Other specified symptoms and signs involving the circulatory and respiratory systems.    COMPARISON:  No relevant prior studies.    TECHNIQUE: Standard bilateral carotid duplex with Doppler imaging performed.    FINDINGS:  The right common carotid artery velocity is 52 cm/sec and the right internal carotid artery velocity is 205 cm/sec. The internal to common ratio is 3.9.  There is heterogeneous plaque at the bulb.    The left common carotid artery velocity is 52 cm/sec and the left internal carotid artery velocity is 86 cm/sec. The internal to common ratio is 1.6.  There is mild plaque at the bulb.  No significant stenoses.    Normal antegrade vertebral artery flow is present.  Impression: 50-69% stenosis of the proximal right internal carotid artery.    Report Date: 3/11/2024 1:23 PM    Finalized on: 3/11/2024 1:23 PM By:  Sho Odell MD  [facility]# [id number]      2024-03-11 13:25:45.156    [facility]             Current Assessment & Plan     Update ultrasound of the carotid artery for comparison.         Bacterial infection - Primary    Overview     Subacute.  Patient having onset of bronchitis.  Was seen by urgent care.  Currently on albuterol, doxycycline, promethazine DM.  Was given prednisone also.  Mild improvement.         Current Assessment & Plan     Start Medrol Dosepak and azithromycin.  Discussed condition course and signs and symptoms to expect.  Patient advised take anti-inflammatories and or Tylenol for pain or fever.  ER precautions.  Call MD or follow-up to clinic if not improving or worsening symptoms.           Overweight with body  mass index (BMI) of 29 to 29.9 in adult    Overview     BMI Readings from Last 10 Encounters:   06/25/24 29.29 kg/m²   03/11/24 31.33 kg/m²   03/06/24 31.40 kg/m²   07/28/23 32.02 kg/m²   02/03/23 32.29 kg/m²   01/04/23 32.29 kg/m²   11/21/22 31.87 kg/m²   09/12/22 31.99 kg/m²   07/28/22 32.24 kg/m²   07/28/21 32.33 kg/m²              Current Assessment & Plan     Improving on Ozempic.  Continue lifestyle modification with diet and exercise.          Other Visit Diagnoses       Encounter for lipid screening for cardiovascular disease                 Review of patient's allergies indicates:   Allergen Reactions    Penicillins      Other reaction(s): Swelling    Sulfa (sulfonamide antibiotics)      Other reaction(s): Rash    Bactrim  [sulfamethoxazole-trimethoprim] Rash     Current Outpatient Medications   Medication Instructions    albuterol (PROVENTIL/VENTOLIN HFA) 90 mcg/actuation inhaler 2 puffs, Inhalation, Every 6 hours PRN    azithromycin (Z-NICOLASA) 250 MG tablet Take 2 tablets by mouth on day 1; Take 1 tablet by mouth on days 2-5<BR>    doxycycline monohydrate 100 mg Tab 1 tablet, Oral, 2 times daily    hydrOXYzine HCL (ATARAX) 25 MG tablet Take 1/2 to 1 tablet for itching as needed. **MAY CAUSE DROWSINESS**    ketoconazole (NIZORAL) 2 % shampoo No dose, route, or frequency recorded.    methylPREDNISolone (MEDROL DOSEPACK) 4 mg tablet follow package directions    montelukast (SINGULAIR) 10 mg tablet TAKE 1 TABLET BY MOUTH EVERY EVENING    OZEMPIC 0.5 mg, Subcutaneous, Weekly    promethazine-dextromethorphan (PROMETHAZINE-DM) 6.25-15 mg/5 mL Syrp 5 mLs, Oral      I have reviewed the PMH, social history, FamilyHx, surgical history, allergies and medications documented / confirmed by the patient at the time of this visit.  Review of Systems   Constitutional:  Negative for activity change and unexpected weight change.   HENT:  Positive for hearing loss. Negative for rhinorrhea and trouble swallowing.    Eyes:   Negative for discharge and visual disturbance.   Respiratory:  Negative for chest tightness and wheezing.    Cardiovascular:  Negative for chest pain and palpitations.   Gastrointestinal:  Negative for blood in stool, constipation, diarrhea and vomiting.   Endocrine: Negative for polydipsia and polyuria.   Genitourinary:  Negative for difficulty urinating, hematuria and urgency.   Musculoskeletal:  Positive for arthralgias. Negative for joint swelling and neck pain.   Neurological:  Negative for weakness and headaches.   Psychiatric/Behavioral:  Negative for confusion and dysphoric mood.      Objective:   /82   Pulse 83   Wt 98 kg (216 lb)   SpO2 96%   BMI 29.29 kg/m²   Physical Exam  Vitals and nursing note reviewed.   Constitutional:       General: He is not in acute distress.     Appearance: He is well-developed. He is not diaphoretic.   HENT:      Head: Normocephalic and atraumatic.      Right Ear: External ear normal.      Left Ear: External ear normal.      Nose: Nose normal. No rhinorrhea.   Eyes:      Extraocular Movements: Extraocular movements intact.      Pupils: Pupils are equal, round, and reactive to light.   Neck:      Vascular: No carotid bruit.   Cardiovascular:      Rate and Rhythm: Normal rate.      Pulses: Normal pulses.   Pulmonary:      Effort: Pulmonary effort is normal. No respiratory distress.      Breath sounds: Normal breath sounds.   Abdominal:      General: Bowel sounds are normal.      Palpations: Abdomen is soft.   Musculoskeletal:         General: Normal range of motion.      Cervical back: Normal range of motion and neck supple.   Lymphadenopathy:      Cervical: No cervical adenopathy.   Skin:     General: Skin is warm and dry.      Capillary Refill: Capillary refill takes less than 2 seconds.      Findings: No rash.   Neurological:      General: No focal deficit present.      Mental Status: He is alert and oriented to person, place, and time.      Cranial Nerves: No  cranial nerve deficit.      Motor: No weakness.      Gait: Gait normal.   Psychiatric:         Attention and Perception: He is attentive.         Mood and Affect: Mood normal. Mood is not anxious or depressed. Affect is not labile, blunt, angry or inappropriate.         Speech: He is communicative. Speech is not rapid and pressured, delayed, slurred or tangential.         Behavior: Behavior normal. Behavior is not agitated, slowed, aggressive, withdrawn, hyperactive or combative.         Thought Content: Thought content normal. Thought content is not paranoid or delusional. Thought content does not include homicidal or suicidal ideation. Thought content does not include homicidal or suicidal plan.         Cognition and Memory: Memory is not impaired.         Judgment: Judgment normal. Judgment is not impulsive or inappropriate.       Physical Exam  Vital Signs  Patient's weight is 216.    Results      Assessment:     1. Bacterial infection    2. Encounter for long-term (current) use of medications    3. Encounter for lipid screening for cardiovascular disease    4. Stenosis of right carotid artery    5. Insulin resistance    6. Overweight with body mass index (BMI) of 29 to 29.9 in adult      MDM:   Moderate medical complexity.  Moderate risk.  Total time: 21 minutes.  This includes total time spent on the encounter, which includes face to face time and non-face to face time preparing to see the patient (eg, review of previous medical records, tests), Obtaining and/or reviewing separately obtained history, documenting clinical information in the electronic or other health record, independently interpreting results (not separately reported)/communicating results to the patient/family/caregiver, and/or care coordination (not separately reported).    I have Reviewed and summarized old records.  I have performed thorough medication reconciliation today and discussed risk and benefits of medications.  I have reviewed  labs and discussed with patient.  All questions were answered.    I have signed for the following orders AND/OR meds.  Orders Placed This Encounter   Procedures    US Carotid Bilateral     Standing Status:   Future     Standing Expiration Date:   6/25/2025     Medications Ordered This Encounter   Medications    azithromycin (Z-NICOLASA) 250 MG tablet     Sig: Take 2 tablets by mouth on day 1; Take 1 tablet by mouth on days 2-5     Dispense:  6 tablet     Refill:  0    methylPREDNISolone (MEDROL DOSEPACK) 4 mg tablet     Sig: follow package directions     Dispense:  21 tablet     Refill:  0        Follow up in about 1 year (around 6/25/2025), or if symptoms worsen or fail to improve, for Annual Wellness Exam.    If no improvement in symptoms or symptoms worsen, advised to call/follow-up at clinic or go to ER. Patient voiced understanding and all questions/concerns were addressed.   DISCLAIMER: This note was compiled by using a speech recognition dictation system and therefore please be aware that typographical / speech recognition errors can and do occur.  Please contact me if you see any errors specifically.  Consent was obtained for [employer] recording system prior to the visit.    Dustin Reyes M.D.       Office: [phone] [address] [place] [address]  FAX: [phone]

## 2024-06-25 NOTE — ASSESSMENT & PLAN NOTE
Continue Ozempic 0.5 milligram weekly.  Monitor for weight loss targeting 10 to 15% body weight over the next few months.    We will plan to monitor hemoglobin A1c at designated intervals 3 to 6 months.  I recommend ongoing Education for diabetic diet and exercise protocol.  We will continue to monitor for side effects.    Please be advised of symptoms to monitor for and to notify me immediately if persistent or worsening.  Follow up with Ophthalmology/Optometry and Podiatry at least annually.

## 2024-07-31 RX ORDER — FAMOTIDINE 20 MG/1
20 TABLET, FILM COATED ORAL NIGHTLY PRN
Qty: 90 TABLET | Refills: 4 | OUTPATIENT
Start: 2024-07-31

## 2024-07-31 NOTE — TELEPHONE ENCOUNTER
Provider Staff:  Action required for this patient    Requires labs      Please see care gap opportunities below in Care Due Message.    Thanks!  [facility]     Appointments      Date Provider   Last Visit   6/25/2024 Dustin Reyes MD   Next Visit   Visit date not found Dustin Reyes MD     Refill Decision Note   Blayne Mendoza  is requesting a refill authorization.    Brief Assessment and Rationale for Refill:   Quick Discontinue       Medication Therapy Plan:   discontinued on 2/3/2023 by Hannah Anguiano NP      Comments:     Note composed:5:39 PM 07/31/2024

## 2024-07-31 NOTE — TELEPHONE ENCOUNTER
Care Due:                  Date            Visit Type   Department     Provider  --------------------------------------------------------------------------------                                EP -                              PRIMARY      [facility]  Last Visit: 06-      CARE ([facility])   MEDICINE       Dustin Reyes  Next Visit: None Scheduled  None         None Found                                                            Last  Test          Frequency    Reason                     Performed    Due Date  --------------------------------------------------------------------------------    HBA1C.......  6 months...  OZEMPIC..................  03- 09-    [facility] Embedded Care Due Messages. Reference number: [id number].   7/31/2024 2:51:04 PM CDT

## 2024-09-11 ENCOUNTER — PATIENT MESSAGE (OUTPATIENT)
Dept: FAMILY MEDICINE | Facility: CLINIC | Age: 69
End: 2024-09-11
Payer: COMMERCIAL

## 2024-09-17 ENCOUNTER — HOSPITAL ENCOUNTER (OUTPATIENT)
Dept: RADIOLOGY | Facility: HOSPITAL | Age: 69
Discharge: HOME | End: 2024-09-17
Attending: NURSE PRACTITIONER
Payer: COMMERCIAL

## 2024-09-17 ENCOUNTER — OFFICE VISIT (OUTPATIENT)
Dept: FAMILY MEDICINE | Facility: CLINIC | Age: 69
End: 2024-09-17
Payer: COMMERCIAL

## 2024-09-17 VITALS
DIASTOLIC BLOOD PRESSURE: 74 MMHG | HEART RATE: 72 BPM | SYSTOLIC BLOOD PRESSURE: 132 MMHG | HEIGHT: 72 IN | WEIGHT: 222.13 LBS | BODY MASS INDEX: 30.09 KG/M2

## 2024-09-17 DIAGNOSIS — M25.552 BILATERAL HIP PAIN: ICD-10-CM

## 2024-09-17 DIAGNOSIS — M25.551 BILATERAL HIP PAIN: ICD-10-CM
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
## 2024-09-17 DIAGNOSIS — H53.9 VISUAL DISTURBANCES: ICD-10-CM

## 2024-09-17 PROCEDURE — 1159F MED LIST DOCD IN RCRD: CPT | Mod: CPTII,S$GLB,, | Performed by: NURSE PRACTITIONER

## 2024-09-17 PROCEDURE — 1160F RVW MEDS BY RX/DR IN RCRD: CPT | Mod: CPTII,S$GLB,, | Performed by: NURSE PRACTITIONER

## 2024-09-17 PROCEDURE — 99999 PR PBB SHADOW E&M-EST. PATIENT-LVL V: CPT | Mod: PBBFAC,,, | Performed by: NURSE PRACTITIONER

## 2024-09-17 PROCEDURE — 99214 OFFICE O/P EST MOD 30 MIN: CPT | Mod: S$GLB,,, | Performed by: NURSE PRACTITIONER

## 2024-09-17 PROCEDURE — 3044F HG A1C LEVEL LT 7.0%: CPT | Mod: CPTII,S$GLB,, | Performed by: NURSE PRACTITIONER

## 2024-09-17 PROCEDURE — 73521 X-RAY EXAM HIPS BI 2 VIEWS: CPT | Mod: TC,PO

## 2024-09-17 PROCEDURE — 1100F PTFALLS ASSESS-DOCD GE2>/YR: CPT | Mod: CPTII,S$GLB,, | Performed by: NURSE PRACTITIONER

## 2024-09-17 PROCEDURE — 1125F AMNT PAIN NOTED PAIN PRSNT: CPT | Mod: CPTII,S$GLB,, | Performed by: NURSE PRACTITIONER

## 2024-09-17 PROCEDURE — 3008F BODY MASS INDEX DOCD: CPT | Mod: CPTII,S$GLB,, | Performed by: NURSE PRACTITIONER

## 2024-09-17 PROCEDURE — 73521 X-RAY EXAM HIPS BI 2 VIEWS: CPT | Mod: 26,,, | Performed by: RADIOLOGY

## 2024-09-17 PROCEDURE — 3075F SYST BP GE 130 - 139MM HG: CPT | Mod: CPTII,S$GLB,, | Performed by: NURSE PRACTITIONER

## 2024-09-17 PROCEDURE — 3078F DIAST BP <80 MM HG: CPT | Mod: CPTII,S$GLB,, | Performed by: NURSE PRACTITIONER

## 2024-09-17 PROCEDURE — 3288F FALL RISK ASSESSMENT DOCD: CPT | Mod: CPTII,S$GLB,, | Performed by: NURSE PRACTITIONER

## 2024-09-17 RX ORDER — MELOXICAM 15 MG/1
15 TABLET ORAL DAILY
Qty: 90 TABLET | Refills: 0 | Status: SHIPPED | OUTPATIENT
Start: 2024-09-17

## 2024-09-17 RX ORDER — METHOCARBAMOL 500 MG/1
500 TABLET, FILM COATED ORAL 4 TIMES DAILY
Qty: 40 TABLET | Refills: 0 | Status: SHIPPED | OUTPATIENT
Start: 2024-09-17 | End: 2024-09-27

## 2024-09-17 NOTE — PATIENT INSTRUCTIONS
Alfredo Cisneros,     If you are due for any health screening(s) below please notify me so we can arrange them to be ordered and scheduled. Most healthy patients at your age complete them, but you are free to accept or refuse.     If you can't do it, I'll definitely understand. If you can, I'd certainly appreciate it!    All of your core healthy metrics are met.      Lets manage your high blood pressure     Your blood pressure was above 140/90 today during your visit. We recommend that you schedule a nurse visit in two weeks to check your blood pressure and discuss ways to support your health goals.     You can also manage your health and record your blood pressure from the comfort of home by keeping a daily blood pressure log. These results are shared with and reviewed by your provider. Please print this form (Daily Blood Pressure Log) to assist you in keeping track of your blood pressure at home.     Schedule your nurse visit in two weeks to learn more about how to track and manage high blood pressure.    Daily Blood Pressure Log    Name:__________________________________                  Date of Birth:_________    Average Blood Pressure:  __________      Date: Time  (a.m.) Blood  Pressure: Pulse  Rate: Time  (p.m.) Blood  Pressure : Pulse  Rate:   Sample 8:37 127/83 84

## 2024-09-26 ENCOUNTER — PATIENT OUTREACH (OUTPATIENT)
Dept: ADMINISTRATIVE | Facility: HOSPITAL | Age: 69
End: 2024-09-26
Payer: COMMERCIAL

## 2024-10-15 ENCOUNTER — PATIENT MESSAGE (OUTPATIENT)
Dept: RESEARCH | Facility: HOSPITAL | Age: 69
End: 2024-10-15
Payer: COMMERCIAL

## 2024-11-05 ENCOUNTER — PATIENT MESSAGE (OUTPATIENT)
Dept: RESEARCH | Facility: HOSPITAL | Age: 69
End: 2024-11-05
Payer: COMMERCIAL

## 2024-11-06 ENCOUNTER — PATIENT OUTREACH (OUTPATIENT)
Dept: ADMINISTRATIVE | Facility: HOSPITAL | Age: 69
End: 2024-11-06
Payer: COMMERCIAL

## 2024-11-07 ENCOUNTER — IMMUNIZATION (OUTPATIENT)
Dept: FAMILY MEDICINE | Facility: CLINIC | Age: 69
End: 2024-11-07
Payer: COMMERCIAL

## 2024-11-07 DIAGNOSIS — Z23 NEED FOR VACCINATION: Primary | ICD-10-CM

## 2024-11-07 PROCEDURE — 90653 IIV ADJUVANT VACCINE IM: CPT | Mod: S$GLB,,, | Performed by: FAMILY MEDICINE

## 2024-11-07 PROCEDURE — 90471 IMMUNIZATION ADMIN: CPT | Mod: S$GLB,,, | Performed by: FAMILY MEDICINE

## 2024-11-22 ENCOUNTER — PATIENT MESSAGE (OUTPATIENT)
Dept: PODIATRY | Facility: CLINIC | Age: 69
End: 2024-11-22
Payer: COMMERCIAL

## 2024-11-22 DIAGNOSIS — M79.641 BILATERAL HAND PAIN: Primary | ICD-10-CM

## 2024-11-22 DIAGNOSIS — M79.642 BILATERAL HAND PAIN: Primary | ICD-10-CM

## 2024-11-25 ENCOUNTER — HOSPITAL ENCOUNTER (OUTPATIENT)
Dept: RADIOLOGY | Facility: HOSPITAL | Age: 69
Discharge: HOME | End: 2024-11-25
Attending: STUDENT IN AN ORGANIZED HEALTH CARE EDUCATION/TRAINING PROGRAM
Payer: COMMERCIAL

## 2024-11-25 ENCOUNTER — OFFICE VISIT (OUTPATIENT)
Dept: ORTHOPEDICS | Facility: CLINIC | Age: 69
End: 2024-11-25
Payer: COMMERCIAL

## 2024-11-25 DIAGNOSIS — M65.341 TRIGGER RING FINGER OF RIGHT HAND: Primary | ICD-10-CM

## 2024-11-25 DIAGNOSIS — M79.644 PAIN OF FINGER OF RIGHT HAND: ICD-10-CM

## 2024-11-25 DIAGNOSIS — M79.641 BILATERAL HAND PAIN: ICD-10-CM

## 2024-11-25 DIAGNOSIS — M79.642 BILATERAL HAND PAIN: ICD-10-CM

## 2024-11-25 PROCEDURE — 73130 X-RAY EXAM OF HAND: CPT | Mod: TC,PO,RT

## 2024-11-25 PROCEDURE — 99999 PR PBB SHADOW E&M-EST. PATIENT-LVL II: CPT | Mod: PBBFAC,,, | Performed by: STUDENT IN AN ORGANIZED HEALTH CARE EDUCATION/TRAINING PROGRAM

## 2024-11-25 PROCEDURE — 73130 X-RAY EXAM OF HAND: CPT | Mod: 26,RT,, | Performed by: RADIOLOGY

## 2024-11-25 RX ORDER — BETAMETHASONE SODIUM PHOSPHATE AND BETAMETHASONE ACETATE 3; 3 MG/ML; MG/ML
6 INJECTION, SUSPENSION INTRA-ARTICULAR; INTRALESIONAL; INTRAMUSCULAR; SOFT TISSUE
Status: DISCONTINUED | OUTPATIENT
Start: 2024-11-25 | End: 2024-11-25 | Stop reason: HOSPADM

## 2024-11-25 RX ADMIN — BETAMETHASONE SODIUM PHOSPHATE AND BETAMETHASONE ACETATE 6 MG: 3; 3 INJECTION, SUSPENSION INTRA-ARTICULAR; INTRALESIONAL; INTRAMUSCULAR; SOFT TISSUE at 09:11

## 2024-11-25 NOTE — PROCEDURES
Sports Medicine US - Guidance for Needle Placement    Date/Time: 11/25/2024 9:40 AM    Performed by: Christopher Bhakta MD  Authorized by: Christopher Bhakta MD  Preparation: Patient was prepped and draped in the usual sterile fashion.  Local anesthesia used: no    Anesthesia:  Local anesthesia used: no    Sedation:  Patient sedated: no    Patient tolerance: patient tolerated the procedure well with no immediate complications  Comments: Ultrasound guidance was used for needle localization. Images were saved and stored for documentation. The appropriate structures were visualized. Dynamic visualization of the needle was continuous throughout the procedures and maintained good position.

## 2024-11-25 NOTE — PROCEDURES
Tendon Sheath    Date/Time: 11/25/2024 9:40 AM    Performed by: Christopher Bhakta MD  Authorized by: Christopher Bhakta MD    Consent Done?:  Yes (Verbal)  Indications:  Pain  Site marked: the procedure site was marked    Timeout: prior to procedure the correct patient, procedure, and site was verified    Prep: patient was prepped and draped in usual sterile fashion      Local anesthetic:  Lidocaine 1% without epinephrine  Location:  Ring finger  Site:  R ring flexor tendon sheath  Ultrasonic guidance for needle placement?: Yes    Needle size:  25 G  Approach:  Volar  Medications:  6 mg betamethasone acetate-betamethasone sodium phosphate 6 mg/mL  Patient tolerance:  Patient tolerated the procedure well with no immediate complications    Additional Comments: Ultrasound guidance was used for needle localization. Images were saved and stored for documentation. The appropriate structures were visualized. Dynamic visualization of the needle was continuous throughout the procedures and maintained good position.

## 2024-11-25 NOTE — PROGRESS NOTES
Patient ID: Blayne Mendoza  YOB: 1955  MRN: [id number]    Chief Complaint: Pain of the Right Hand      Referred By: self      History of Present Illness: Blayne Mendoza is here today for a followup on  right 5th MCP joint pain. Patient  received right small finger injection on 8/14/23, injection was very helpful for pain. States that injection has worn off and he when he wakes up in the mornings he has to lay his hands on a pillow  due to them getting jammed all the time. Hurts in the joint of ring and pinky finger. He also reports clicking and locking in ring finger. He is requesting another right small finger injection today.        8/14/2023 Interval History of Present Illness: Blayne Mendoza is a right-hand dominant 69 y.o. male who presents today with right 5th MCP joint pain. Patient states that he has been having right ring and pinky finger problems for years. He said that when he was in [employer] high, he was always having to taped those two fingers together due to them getting jammed all the time. He is a , he does a lot of typing all day everyday. Hurts in the joint of ring and pinky finger. He says at times the pain is so intense, he could tear up. Uses Voltaren Gel prescribed by PCP.     The patient is active in none.  Occupation:       Past Medical History:   Past Medical History:   Diagnosis Date    Allergy     Basal cell carcinoma     Cancer     Prostate    Colon polyp     Erosive gastritis 6/25/2020    Insulin resistance 7/28/2021     Past Surgical History:   Procedure Laterality Date    ADENOIDECTOMY  1966    Tonsils and adenoids removed    COLONOSCOPY N/A 12/1/2015    Procedure: COLONOSCOPY;  Surgeon: Rod Rayo MD;  Location: [facility] ENDO;  Service: Endoscopy;  Laterality: N/A;    COLONOSCOPY N/A 6/25/2020    Procedure: COLONOSCOPY;  Surgeon: Rod Rayo MD;  Location: [facility];  Service: Endoscopy;  Laterality: N/A;     ESOPHAGOGASTRODUODENOSCOPY N/A 2020    Procedure: ESOPHAGOGASTRODUODENOSCOPY (EGD);  Surgeon: Rod Rayo MD;  Location: [facility];  Service: Endoscopy;  Laterality: N/A;    PROSTATE SURGERY  2008    radical prostatectomy    TONSILLECTOMY      Child    VASECTOMY       Family History   Problem Relation Name Age of Onset    Cancer Mother Flora         Ovarian Cancer-     Social History     Socioeconomic History    Marital status:    Tobacco Use    Smoking status: Former     Current packs/day: 0.00     Average packs/day: 0.3 packs/day for 5.0 years (1.3 ttl pk-yrs)     Types: Cigarettes, Cigars     Start date: 1977     Quit date: 1982     Years since quittin.0    Smokeless tobacco: Never    Tobacco comments:     Still crave one at times.  Makes me sick for days when I have one   Substance and Sexual Activity    Alcohol use: Yes     Alcohol/week: 6.0 standard drinks of alcohol     Types: 6 Drinks containing 0.5 oz of alcohol per week     Comment: one a day    Drug use: No    Sexual activity: Not Currently     Partners: Female     Comment: My prostate removal has cause nerve damage-ED-must fix!     Social Drivers of Health     Financial Resource Strain: Low Risk  (3/4/2024)    Overall Financial Resource Strain (CARDIA)     Difficulty of Paying Living Expenses: Not hard at all   Food Insecurity: No Food Insecurity (3/4/2024)    Hunger Vital Sign     Worried About Running Out of Food in the Last Year: Never true     Ran Out of Food in the Last Year: Never true   Transportation Needs: No Transportation Needs (3/4/2024)    PRAPARE - Transportation     Lack of Transportation (Medical): No     Lack of Transportation (Non-Medical): No   Physical Activity: Insufficiently Active (3/4/2024)    Exercise Vital Sign     Days of Exercise per Week: 2 days     Minutes of Exercise per Session: 70 min   Stress: No Stress Concern Present (3/4/2024)    [employer] -  Occupational Stress Questionnaire     Feeling of Stress : Only a little   Housing Stability: Low Risk  (3/4/2024)    Housing Stability Vital Sign     Unable to Pay for Housing in the Last Year: No     Number of Places Lived in the Last Year: 1     Unstable Housing in the Last Year: No     Medication List with Changes/Refills   Current Medications    DOXYCYCLINE MONOHYDRATE 100 MG TAB    Take 1 tablet by mouth 2 (two) times daily.    HYDROXYZINE HCL (ATARAX) 25 MG TABLET    Take 1/2 to 1 tablet for itching as needed. **MAY CAUSE DROWSINESS**    KETOCONAZOLE (NIZORAL) 2 % SHAMPOO        MELOXICAM (MOBIC) 15 MG TABLET    Take 1 tablet (15 mg total) by mouth once daily.    MONTELUKAST (SINGULAIR) 10 MG TABLET    TAKE 1 TABLET BY MOUTH EVERY EVENING    OZEMPIC 0.25 MG OR 0.5 MG (2 MG/3 ML) PEN INJECTOR    Inject 0.5 mg into the skin once a week.     Review of patient's allergies indicates:   Allergen Reactions    Penicillins      Other reaction(s): Swelling    Sulfa (sulfonamide antibiotics)      Other reaction(s): Rash    Bactrim  [sulfamethoxazole-trimethoprim] Rash       Physical Exam:   There is no height or weight on file to calculate BMI.    GENERAL: Well appearing, in no acute distress.  HEAD: Normocephalic and atraumatic.  ENT: External ears and nose grossly normal.  EYES: EOMI bilaterally  PULMONARY: Respirations are grossly even and non-labored.  NEURO: Awake, alert, and oriented x 3.  SKIN: No obvious rashes appreciated.  PSYCH: Mood & affect are appropriate.    Detailed MSK exam:     Right hand/wrist exam:   -TTP: 4th MCP volar, active triggering 4th digit  -Swelling/ecchymosis: none  -Full ROM  - strength 5/5  -Sensation intact  -Pulses 2+  -Rotational deformity: negative  -Tinel sign: negative  -Phalen sign: negative  -Finkelstein sign: negative      Imaging:  X-Ray Hips Bilateral 2 View Incl AP Pelvis  Narrative: EXAMINATION:  XR HIPS BILATERAL 2 VIEW INCL AP PELVIS    CLINICAL HISTORY:  Pain in right  hip    TECHNIQUE:  AP view of the pelvis and frogleg lateral views of both hips were performed.    COMPARISON:  None.    FINDINGS:  The bony pelvis is intact. Both femoral heads are well seated within acetabula.  No significant joint space narrowing identified.  No plain film evidence to suggest AVN of either hip.  Prominent bilateral facet arthropathy seen at what appears to represent L4-L5 bilaterally.  Impression: As above    Electronically signed by: Shun Chandler,   Date:    09/17/2024  Time:    13:53        Relevant imaging results were reviewed and interpreted by me and per my read shows mild arthritic changes thumb IP joint.  This was discussed with the patient and / or family today.     Assessment:  Blayne Mendoza is a 69 y.o. male presenting with right hand pain.   History, physical and radiographs are consistent with a likely diagnosis of right 4th finger trigger finger.   Plan: Steroid injection given today (see separate procedure note for details). We discussed the proper protocols after the injection such as no submerging pools, baths tubs, or hot tubs for 24 hr.  Showering is okay today.  We also discussed that blood sugars can be elevated after an injection and asked patient to properly checked her sugars over the next few days and contact their PCP if there are any concerns.  We discussed red flags such as fevers, chills, red, warm, tender joint at the area of injection to please seek medical care immediately.   Voltaren gel as needed. OTC trigger finger splint at night. Continue conservative management for pain.   Follow up as needed. All questions answered.      Trigger ring finger of right hand  -     Tendon Sheath    Pain of finger of right hand  -     Sports Medicine US - Guidance for Needle Placement       Ultrasound guidance was used for needle localization. Images were saved and stored for documentation. The appropriate structures were visualized. Dynamic visualization of the needle was  continuous throughout the procedures and maintained good position.       A copy of today's visit note has been sent to the referring provider.     Electronically signed:  Christopher Bhakta MD, MPH  11/25/2024  10:02 AM

## 2024-11-25 NOTE — PATIENT INSTRUCTIONS
Assessment:  Blayne Mendoza is a 69 y.o. male   Chief Complaint   Patient presents with    Right Hand - Pain       Encounter Diagnosis   Name Primary?    Pain of finger of right hand Yes        Plan:  Ultrasound guided cortisone injection to the right finger for trigger finger  We discussed the proper protocols after the injection such as no submerging pools, baths tubs, or hot tubs for 24 hr.  Showering is okay today.  We also discussed that blood sugars can be elevated after an injection and asked patient to properly checked her sugars over the next few days and contact their PCP if there are any concerns.  We discussed red flags such as fevers, chills, red, warm, tender joint at the area of injection to please seek medical care immediately.    Follow up as needed    Follow-up: as needed.    Thank you for choosing [facility] and Dr. Christopher Bhakta for your orthopedic & sports medicine care. It is our goal to provide you with exceptional care that will help keep you healthy, active, and get you back in the game.    Please do not hesitate to reach out to us via email, phone, or MyChart with any questions, concerns, or feedback.    If you felt that you received exemplary care today, please consider leaving us feedback on [employer] at:  [email or web address]    If you are experiencing pain/discomfort ,or have questions after 5pm and would like to be connected to the [facility] on-call team, please call this number and specify which Sports Medicine provider is treating you: [phone]

## 2024-12-16 ENCOUNTER — PATIENT MESSAGE (OUTPATIENT)
Dept: AUDIOLOGY | Facility: CLINIC | Age: 69
End: 2024-12-16
Payer: COMMERCIAL

## 2024-12-17 ENCOUNTER — E-VISIT (OUTPATIENT)
Dept: FAMILY MEDICINE | Facility: CLINIC | Age: 69
End: 2024-12-17
Payer: COMMERCIAL

## 2024-12-17 DIAGNOSIS — J01.90 ACUTE BACTERIAL SINUSITIS: Primary | ICD-10-CM

## 2024-12-17 DIAGNOSIS — R05.1 ACUTE COUGH: ICD-10-CM

## 2024-12-17 DIAGNOSIS — B96.89 ACUTE BACTERIAL SINUSITIS: Primary | ICD-10-CM

## 2024-12-17 RX ORDER — PROMETHAZINE HYDROCHLORIDE AND DEXTROMETHORPHAN HYDROBROMIDE 6.25; 15 MG/5ML; MG/5ML
5 SYRUP ORAL EVERY 6 HOURS PRN
Qty: 118 ML | Refills: 0 | Status: SHIPPED | OUTPATIENT
Start: 2024-12-17 | End: 2024-12-27

## 2024-12-17 RX ORDER — AZITHROMYCIN 250 MG/1
TABLET, FILM COATED ORAL
Qty: 6 TABLET | Refills: 0 | Status: SHIPPED | OUTPATIENT
Start: 2024-12-17 | End: 2024-12-22

## 2024-12-17 RX ORDER — PREDNISONE 10 MG/1
10 TABLET ORAL 2 TIMES DAILY
Qty: 10 TABLET | Refills: 0 | Status: SHIPPED | OUTPATIENT
Start: 2024-12-17

## 2024-12-17 NOTE — PROGRESS NOTES
Patient ID: Blayne Mendoza is a 69 y.o. male.    Chief Complaint: General Illness (Entered automatically based on patient selection in [facility].)    The patient initiated a request through [facility] on 12/17/2024 for evaluation and management with a chief complaint of General Illness (Entered automatically based on patient selection in [facility].)     I evaluated the questionnaire submission on 12/17/2024.    [facility] Evisit [facility]-Cough And Cold    12/17/2024  7:41 AM CST - Filed by Patient   What do you need help with? Sinus Infection   Do you agree to participate in an E-Visit? Yes   If you have any of the following symptoms, go to your local emergency room or call 911: I acknowledge   What is the main issue you would like addressed today? Medication to combat sinus infectio/soar throat/cough/headache.  Well for [place] has work to do!   Do you think you might have COVID or the Flu? No   Have you tested positive for COVID or Flu? No   What symptoms do you currently have?  Cough;  Fatigue;  Headache;  Nasal Congestion;  Sore throat;  Pain around the nose and face   Describe your cough: Productive (containing mucus)   Describe the mucus: Thick   Have you had any of the following? Trouble swallowing   Please enter a few details about your swallowing, breathing, or visual problems. [facility].  It was a cold clear night!  Did not have a heavy coat. Rest if weekend in [place] and it got hot. [employer] Party [facility].   Have you ever smoked? I smoked in the past   Have you had a fever? Yes   What has been the range of your fever? I have not checked my temperature with a thermometer.   When did your symptoms first appear? 12/16/2024   In the last two weeks, have you been in close contact with someone who has COVID-19 or the Flu? No   List what you have done or taken to help your symptoms. 10 hours sleep last night.  Advil gell caps   How  severe are your symptoms? Moderate   Have your symptoms gotten better or worse since they started?  No change   Do you have transportation to get testing if it is needed and ordered for you at an [facility] location? Yes   Provide any additional information you feel is important. Stressful time of the year at work.  A lot going on with [employer].; Post election exhaustion I beleive.; Facing a [employer] Exam in three weeks.   Please attach any relevant images or files    Are you able to take your vital signs? No          Active Problem List with Overview Notes    Diagnosis Date Noted    Bacterial infection 06/25/2024     Subacute.  Patient having onset of bronchitis.  Was seen by urgent care.  Currently on albuterol, doxycycline, promethazine DM.  Was given prednisone also.  Mild improvement.      Stenosis of right carotid artery 06/25/2024     US Carotid Bilateral  Narrative: EXAM:  US CAROTID BILATERAL    CLINICAL HISTORY:  [R09.89]-Other specified symptoms and signs involving the circulatory and respiratory systems.    COMPARISON:  No relevant prior studies.    TECHNIQUE: Standard bilateral carotid duplex with Doppler imaging performed.    FINDINGS:  The right common carotid artery velocity is 52 cm/sec and the right internal carotid artery velocity is 205 cm/sec. The internal to common ratio is 3.9.  There is heterogeneous plaque at the bulb.    The left common carotid artery velocity is 52 cm/sec and the left internal carotid artery velocity is 86 cm/sec. The internal to common ratio is 1.6.  There is mild plaque at the bulb.  No significant stenoses.    Normal antegrade vertebral artery flow is present.  Impression: 50-69% stenosis of the proximal right internal carotid artery.    Report Date: 3/11/2024 1:23 PM    Finalized on: 3/11/2024 1:23 PM By:  Sho Odell MD  [facility]# [id number]      2024-03-11 13:25:45.156    [facility]          Overweight with body mass index (BMI) of 29 to 29.9 in adult  06/25/2024     BMI Readings from Last 10 Encounters:   06/25/24 29.29 kg/m²   03/11/24 31.33 kg/m²   03/06/24 31.40 kg/m²   07/28/23 32.02 kg/m²   02/03/23 32.29 kg/m²   01/04/23 32.29 kg/m²   11/21/22 31.87 kg/m²   09/12/22 31.99 kg/m²   07/28/22 32.24 kg/m²   07/28/21 32.33 kg/m²           Vitamin deficiency 03/06/2024    Pain of finger of right hand 07/28/2023    Carotid bruit 07/28/2023    Decreased range of motion of both ankles 07/24/2023    Decreased activity tolerance 07/24/2023    BMI 32.0-32.9,adult 02/03/2023     Encourage increased physical activity, healthy diet choices, and weight loss for prevention of progression of comorbid conditions. He states he has recently signed up for the gym and a . He plans to start working on diet and exercise.     Wt Readings from Last 3 Encounters:   02/03/23 0739 108 kg (238 lb 1.6 oz)   01/04/23 0949 108 kg (238 lb 1.6 oz)   11/21/22 1127 106.6 kg (235 lb)         Screening for skin cancer 02/03/2023     Requesting referral to dermatology for routine skin check. Reports having some lesions removed with Dr. Bangura and Dr. Escalante in the past ([phone]).       Hyperlipidemia 02/03/2023     -chronic condition. Currently stable.    -not currently on a statin; reports would like to avoid starting medication. Plans to work on lifestyle modifications.   -recent labs listed below:  Lab Results   Component Value Date    CHOL 232 (H) 07/28/2022     Lab Results   Component Value Date    HDL 43 07/28/2022     Lab Results   Component Value Date    LDLCALC 161.8 (H) 07/28/2022     Lab Results   Component Value Date    TRIG 136 07/28/2022     Lab Results   Component Value Date    ALT 25 07/28/2022    AST 19 07/28/2022    ALKPHOS 74 07/28/2022    BILITOT 0.8 07/28/2022           Elevated blood-pressure reading without diagnosis of hypertension 01/04/2023 Jan 2023: No prior diagnosis of hypertension and the blood pressure has been high on recent checks (178/88).  Not currently on any antihypertensives. He did test positive for covid x2 days ago. The patient has had no vision changes, chest pain, shortness of breath, palpitations.      Feb 2023: BP at goal today (138/84). Not currently on anti-hypertensive.   -continue lifestyle modification with low sodium diet and exercise   -discussed hypertension disease course and importance of treating high blood pressure  -patient understood and advised of risk of untreated blood pressure.  ER precautions were given for symptoms of hypertensive urgency and emergency.      Risk for coronary artery disease greater than 20% in next 10 years per [place] score 01/04/2023     The 10-year ASCVD risk score (Jesus CHILDERS, et al., 2019) is: 20.7%    Values used to calculate the score:      Age: 67 years      Sex: Male      Is Non- : No      Diabetic: No      Tobacco smoker: No      Systolic Blood Pressure: 138 mmHg      Is BP treated: No      HDL Cholesterol: 43 mg/dL      Total Cholesterol: 232 mg/dL     Reviewed with patient. Discussed consider low-dose ASA and statin for primary prevention. Patient reports he would like to avoid starting any additional medications at this time. He states he has recently signed up for the gym and a . He plans to start working on diet and exercise.       Dysfunction of both eustachian tubes 11/21/2022     Chronic.  Recurrent.  Patient has been taking a lot of flights over the past few months.  Patient reports that his right ear has discomfort.  Patient reports he has decreased hearing was recommended to get hearing aids.      Insulin resistance 07/28/2021 June 2024:  Doing well on Ozempic.  Patient desires to continue.  November 2022:  Patient tolerating Ozempic well.  However he has been off of it for a few weeks due to recent travel.    BMI Readings from Last 10 Encounters:   03/11/24 31.33 kg/m²   03/06/24 31.40 kg/m²   07/28/23 32.02 kg/m²   02/03/23 32.29 kg/m²  "  01/04/23 32.29 kg/m²   11/21/22 31.87 kg/m²   09/12/22 31.99 kg/m²   07/28/22 32.24 kg/m²   07/28/21 32.33 kg/m²   07/26/21 32.25 kg/m²   Patient denies any history of thyroid cancer, pancreatitis, MEN syndrome.   Management Status Statin: Not takingACE/ARB: Not taking  Diabetes Management StatusStatin: Not takingACE/ARB: Not taking    Screening or Prevention Patient's value Goal Complete/Controlled?   HgA1C Testing and Control   Lab Results   Component Value Date    HGBA1C 5.5 03/11/2024      Annually/Less than 8% Yes   Lipid profile : 03/11/2024 Annually Yes   LDL control Lab Results   Component Value Date    LDLCALC 146.8 03/11/2024    Annually/Less than 100 mg/dl  No   Nephropathy screening No results found for: "LABMICR"  Lab Results   Component Value Date    PROTEINUA Negative 07/28/2023     No results found for: "UTPCR"   Annually Yes   Blood pressure BP Readings from Last 1 Encounters:   06/25/24 138/82    Less than 140/90 Yes   Dilated retinal exam Most Recent Eye Exam Date: Not Found Annually No   Foot exam   Most Recent Foot Exam Date: Not Found Annually No           Hearing loss 07/28/2021     Chronic. Stable. Following with ENT and audiology. Using hearing aids bilaterally.       Rotator cuff syndrome, right 07/30/2020     X-ray shows degenerative change of AC joint and glenohumeral joint.  Patient with overuse of the right arm with extracurricular activities and at work.  Has not had any steroid injection.  Patient takes Tylenol or anti-inflammatory as needed for the pain.      Right shoulder pain 07/28/2020     X-ray Shoulder 2 or More Views Right  Narrative: EXAMINATION:  XR SHOULDER COMPLETE 2 OR MORE VIEWS RIGHT    CLINICAL HISTORY:  pain;Pain in right shoulder    TECHNIQUE:  Two or three views of the right shoulder were performed.    COMPARISON:  None    FINDINGS:  Advanced degenerative changes at the AC joint with marginal spurring including along the inferior aspect of the joint.  Mild " degenerative changes at the glenohumeral articulation noted as well.  No acute fracture or dislocation.  Soft tissues appear normal.  Impression: As above    Electronically signed by: Matty Small MD  Date:    07/28/2020  Time:    11:20          Encounter for long-term (current) use of medications 07/28/2020 June 2024:  Reviewed labs.  November 2022: Reviewed labs.  CHRONIC. Stable. Compliant with medications for managed conditions. See medication list. No SE reported.   Routine lab analysis is being monitored. Refills were addressed.  Lab Results   Component Value Date    WBC 5.46 03/11/2024    HGB 14.9 03/11/2024    HCT 45.7 03/11/2024    MCV 96 03/11/2024     03/11/2024         Chemistry        Component Value Date/Time     03/11/2024 0900    K 4.6 03/11/2024 0900     03/11/2024 0900    CO2 25 03/11/2024 0900    BUN 18 03/11/2024 0900    CREATININE 0.9 03/11/2024 0900     03/11/2024 0900        Component Value Date/Time    CALCIUM 9.6 03/11/2024 0900    ALKPHOS 73 03/11/2024 0900    AST 20 03/11/2024 0900    ALT 20 03/11/2024 0900    BILITOT 0.5 03/11/2024 0900    ESTGFRAFRICA >60.0 07/28/2022 0828    EGFRNONAA >60.0 07/28/2022 0828          Lab Results   Component Value Date    TSH 1.787 03/11/2024           Erectile dysfunction following radical prostatectomy 08/20/2018    Prostate cancer 08/20/2018    Colon polyps 12/01/2015      Recent Labs Obtained:  No visits with results within 7 Day(s) from this visit.   Latest known visit with results is:   Patient Outreach on 09/26/2024   Component Date Value Ref Range Status    Left Eye DM Retinopathy 10/08/2024 Negative   Final    Right Eye DM Retinopathy 10/08/2024 Negative   Final       Encounter Diagnoses   Name Primary?    Acute bacterial sinusitis Yes    Acute cough         No orders of the defined types were placed in this encounter.     Medications Ordered This Encounter   Medications    azithromycin (Z-NICOLASA) 250 MG tablet     Sig:  Take 2 tablets by mouth on day 1; Take 1 tablet by mouth on days 2-5     Dispense:  6 tablet     Refill:  0    predniSONE (DELTASONE) 10 MG tablet     Sig: Take 1 tablet (10 mg total) by mouth 2 (two) times daily.     Dispense:  10 tablet     Refill:  0    promethazine-dextromethorphan (PROMETHAZINE-DM) 6.25-15 mg/5 mL Syrp     Sig: Take 5 mLs by mouth every 6 (six) hours as needed (cough).     Dispense:  118 mL     Refill:  0      E-Visit Time Trackin minutes

## 2025-01-26 ENCOUNTER — PATIENT MESSAGE (OUTPATIENT)
Dept: FAMILY MEDICINE | Facility: CLINIC | Age: 70
End: 2025-01-26
Payer: COMMERCIAL

## 2025-01-26 DIAGNOSIS — H35.9 RETINA DISORDER: Primary | ICD-10-CM

## 2025-01-27 NOTE — TELEPHONE ENCOUNTER
I have signed for the following orders AND/OR meds.  Please call the patient and ask the patient to schedule the testing AND/OR inform about any medications that were sent.      Orders Placed This Encounter   Procedures    Ambulatory referral/consult to Ophthalmology     Standing Status:   Future     Standing Expiration Date:   2/27/2026     Referral Priority:   Routine     Referral Type:   Consultation     Referral Reason:   Specialty Services Required     Requested Specialty:   Ophthalmology     Number of Visits Requested:   1

## 2025-01-31 DIAGNOSIS — R05.1 ACUTE COUGH: ICD-10-CM

## 2025-01-31 RX ORDER — PROMETHAZINE HYDROCHLORIDE AND DEXTROMETHORPHAN HYDROBROMIDE 6.25; 15 MG/5ML; MG/5ML
SYRUP ORAL
Qty: 100 ML | Refills: 0 | Status: SHIPPED | OUTPATIENT
Start: 2025-01-31

## 2025-02-05 ENCOUNTER — PATIENT MESSAGE (OUTPATIENT)
Dept: AUDIOLOGY | Facility: CLINIC | Age: 70
End: 2025-02-05
Payer: COMMERCIAL

## 2025-02-25 ENCOUNTER — DOCUMENTATION ONLY (OUTPATIENT)
Dept: AUDIOLOGY | Facility: CLINIC | Age: 70
End: 2025-02-25
Payer: COMMERCIAL

## 2025-02-25 ENCOUNTER — PATIENT MESSAGE (OUTPATIENT)
Dept: OTOLARYNGOLOGY | Facility: CLINIC | Age: 70
End: 2025-02-25
Payer: COMMERCIAL

## 2025-02-25 ENCOUNTER — CLINICAL SUPPORT (OUTPATIENT)
Dept: AUDIOLOGY | Facility: CLINIC | Age: 70
End: 2025-02-25
Payer: COMMERCIAL

## 2025-02-25 DIAGNOSIS — Z97.4 WEARS HEARING AID IN BOTH EARS: Primary | ICD-10-CM

## 2025-02-25 DIAGNOSIS — H91.93 BILATERAL HEARING LOSS, UNSPECIFIED HEARING LOSS TYPE: Primary | ICD-10-CM

## 2025-02-25 PROCEDURE — 92567 TYMPANOMETRY: CPT | Mod: S$GLB,,, | Performed by: AUDIOLOGIST

## 2025-02-25 PROCEDURE — 92556 SPEECH AUDIOMETRY COMPLETE: CPT | Mod: S$GLB,,, | Performed by: AUDIOLOGIST

## 2025-02-25 PROCEDURE — 99999 PR PBB SHADOW E&M-EST. PATIENT-LVL I: CPT | Mod: PBBFAC,,, | Performed by: AUDIOLOGIST

## 2025-02-25 PROCEDURE — 92552 PURE TONE AUDIOMETRY AIR: CPT | Mod: S$GLB,,, | Performed by: AUDIOLOGIST

## 2025-02-25 PROCEDURE — 99499 UNLISTED E&M SERVICE: CPT | Mod: S$GLB,,, | Performed by: AUDIOLOGIST

## 2025-02-25 NOTE — PROGRESS NOTES
I cleaned & tested the patient's hearing aids before his annual adjustment. I changed his receivers from [employer] to [employer].

## 2025-02-25 NOTE — Clinical Note
Please see attached audiological test results and recommendations. The patient had a decrease in speech discrimination compared to two previous audiograms. Please contact patient if you have further recommendations.

## 2025-02-25 NOTE — PROGRESS NOTES
The patient was referred by Nicole Segura NP for a hearing evaluation.    The patient has a history of bilateral sensorineural hearing loss and use of hearing aid binaurally. Patient is here today for annual hearing evaluation.    Otoscopic screening revealed a clear view of TM AU    A hearing evaluation was performed today. Test results indicated a mild to profound hearing loss at 500- 8K Hz in the right ear and a moderate to profound hearing loss at 1K - 8K Hz in the left ear. (Air only since results were essentially the same as those from 9/12/22) A decrease in speech discrimination was noted for the right ear (re: audiogram from 9/12/22). Impedance testing showed a Type A tympanogram in each ear, consistent with normal middle ear function.    The recommendations were as follows:    (1)  Medical evaluation due to decrease in speech discrimination of right ear. Patient was informed that today's results will be forwarded to Nicole Segura NP with a             request that she contact him with any further recommendations.  (1)  Continued use of binaural amplification. The patient will be seen  for a hearing aid check today.  (2)  Ear protection in loud noise   (3)  Hearing evaluation in one year or sooner if hearing decrease is noted       Today's test results and recommendations were discussed with the patient.

## 2025-02-25 NOTE — PROGRESS NOTES
The patient was seen for a hearing aid follow-up appointment after having a hearing evaluation performed today. Hearing thresholds from today's evaluation were entered into the hearing aid software, and the programming for the left and right hearing aid was recalculated using the current hearing thresholds. The right and left hearing aid firmware was updated in the hearing aid software. The right and left hearing aid was cleaned and checked.  A listening check revealed each aid to sound good. The  on the right and left hearing aid was changed to a  using a ceruStop filter. The patient was instructed on how to change the ceruStop filters, and extra ceruStop filters were given to the patient. An annual audiological evaluation was recommended. The patient will contact us as needed.

## 2025-05-05 ENCOUNTER — E-VISIT (OUTPATIENT)
Dept: FAMILY MEDICINE | Facility: CLINIC | Age: 70
End: 2025-05-05
Payer: COMMERCIAL

## 2025-05-05 DIAGNOSIS — J01.90 ACUTE BACTERIAL SINUSITIS: Primary | ICD-10-CM

## 2025-05-05 DIAGNOSIS — B96.89 ACUTE BACTERIAL SINUSITIS: Primary | ICD-10-CM

## 2025-05-06 RX ORDER — METHYLPREDNISOLONE 4 MG/1
TABLET ORAL
Qty: 21 TABLET | Refills: 0 | Status: SHIPPED | OUTPATIENT
Start: 2025-05-06

## 2025-05-06 RX ORDER — AZITHROMYCIN 250 MG/1
TABLET, FILM COATED ORAL
Qty: 6 TABLET | Refills: 0 | Status: SHIPPED | OUTPATIENT
Start: 2025-05-06 | End: 2025-05-11

## 2025-05-06 NOTE — PROGRESS NOTES
Patient ID: Blayne Mendoza is a 69 y.o. male.    Chief Complaint: General Illness (Entered automatically based on patient selection in [employer].)    The patient initiated a request through [employer] on 5/5/2025 for evaluation and management with a chief complaint of General Illness (Entered automatically based on patient selection in [employer].)     I evaluated the questionnaire submission on 5/6/25.    [facility] Evisit [facility]-Cough And Cold    5/6/2025 11:22 AM CDT - Filed by Patient   What do you need help with? Other Concern   Do you agree to participate in an E-Visit? Yes   If you have any of the following symptoms, please go to the nearest emergency room or call 911: I acknowledge   What is the main issue you would like addressed today? Sinus infection. Went to NO walkin yesterday and got an injection of Decadron to help as I have a meeting with the [place] [employer] on two terrible bills passes by the house.   Please describe your symptoms. 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 [place], I also fell on vacation split my head over my right eye.  W[facility] to er [facility] after three hours of waiting.  Made a butterfly bandage.  Slippes on a wet tile floor and could not break my fall .  Neck and shoukder ache   On a scale of 1-10, where 10 is the worst you can imagine, how severe are your symptoms? (range: 1 - 10) 3   Have you had these symptoms before? Yes   How long have you had these symptoms? A few days   What helps with your symptoms? Z pac and dise pac   What makes your symptoms feel worse? Not treating   Are your symptoms related to a condition you currently have? Yes   What condition do you currently have? Sinus infection and post fall   When were you last seen for this condition?    Provide any additional information you feel is important. Leena, I had the old ban and just updated.  I need to schedule annual  physical and PSA for tracking post  prostate cancer removal.  I also need to get my records aent to you an Dustin from Dr Robyn Chowdhury and his retnia specialist at [facility]   Please attach any relevant images or files    Are you able to take your vital signs? No          Active Problem List with Overview Notes    Diagnosis Date Noted    Bacterial infection 06/25/2024     Subacute.  Patient having onset of bronchitis.  Was seen by urgent care.  Currently on albuterol, doxycycline, promethazine DM.  Was given prednisone also.  Mild improvement.      Stenosis of right carotid artery 06/25/2024     US Carotid Bilateral  Narrative: EXAM:  US CAROTID BILATERAL    CLINICAL HISTORY:  [R09.89]-Other specified symptoms and signs involving the circulatory and respiratory systems.    COMPARISON:  No relevant prior studies.    TECHNIQUE: Standard bilateral carotid duplex with Doppler imaging performed.    FINDINGS:  The right common carotid artery velocity is 52 cm/sec and the right internal carotid artery velocity is 205 cm/sec. The internal to common ratio is 3.9.  There is heterogeneous plaque at the bulb.    The left common carotid artery velocity is 52 cm/sec and the left internal carotid artery velocity is 86 cm/sec. The internal to common ratio is 1.6.  There is mild plaque at the bulb.  No significant stenoses.    Normal antegrade vertebral artery flow is present.  Impression: 50-69% stenosis of the proximal right internal carotid artery.    Report Date: 3/11/2024 1:23 PM    Finalized on: 3/11/2024 1:23 PM By:  Sho Odell MD  [facility]# [id number]      2024-03-11 13:25:[id number]    [facility]          Overweight with body mass index (BMI) of 29 to 29.9 in adult 06/25/2024     BMI Readings from Last 10 Encounters:   06/25/24 29.29 kg/m²   03/11/24 31.33 kg/m²   03/06/24 31.40 kg/m²   07/28/23 32.02 kg/m²   02/03/23 32.29 kg/m²   01/04/23 32.29 kg/m²   11/21/22 31.87 kg/m²   09/12/22 31.99 kg/m²   07/28/22 32.24 kg/m²    07/28/21 32.33 kg/m²           Vitamin deficiency 03/06/2024    Pain of finger of right hand 07/28/2023    Carotid bruit 07/28/2023    Decreased range of motion of both ankles 07/24/2023    Decreased activity tolerance 07/24/2023    BMI 32.0-32.9,adult 02/03/2023     Encourage increased physical activity, healthy diet choices, and weight loss for prevention of progression of comorbid conditions. He states he has recently signed up for the gym and a . He plans to start working on diet and exercise.     Wt Readings from Last 3 Encounters:   02/03/23 0739 108 kg (238 lb 1.6 oz)   01/04/23 0949 108 kg (238 lb 1.6 oz)   11/21/22 1127 106.6 kg (235 lb)         Screening for skin cancer 02/03/2023     Requesting referral to dermatology for routine skin check. Reports having some lesions removed with Dr. Bangura and Dr. Escalante in the past ([id number]).       Hyperlipidemia 02/03/2023     -chronic condition. Currently stable.    -not currently on a statin; reports would like to avoid starting medication. Plans to work on lifestyle modifications.   -recent labs listed below:  Lab Results   Component Value Date    CHOL 232 (H) 07/28/2022     Lab Results   Component Value Date    HDL 43 07/28/2022     Lab Results   Component Value Date    LDLCALC 161.8 (H) 07/28/2022     Lab Results   Component Value Date    TRIG 136 07/28/2022     Lab Results   Component Value Date    ALT 25 07/28/2022    AST 19 07/28/2022    ALKPHOS 74 07/28/2022    BILITOT 0.8 07/28/2022           Elevated blood-pressure reading without diagnosis of hypertension 01/04/2023 Jan 2023: No prior diagnosis of hypertension and the blood pressure has been high on recent checks (178/88). Not currently on any antihypertensives. He did test positive for covid x2 days ago. The patient has had no vision changes, chest pain, shortness of breath, palpitations.      Feb 2023: BP at goal today (138/84). Not currently on anti-hypertensive.   -continue  lifestyle modification with low sodium diet and exercise   -discussed hypertension disease course and importance of treating high blood pressure  -patient understood and advised of risk of untreated blood pressure.  ER precautions were given for symptoms of hypertensive urgency and emergency.      Risk for coronary artery disease greater than 20% in next 10 years per Clear score 01/04/2023     The 10-year ASCVD risk score (Jesus CHILDERS, et al., 2019) is: 20.7%    Values used to calculate the score:      Age: 67 years      Sex: Male      Is Non- : No      Diabetic: No      Tobacco smoker: No      Systolic Blood Pressure: 138 mmHg      Is BP treated: No      HDL Cholesterol: 43 mg/dL      Total Cholesterol: 232 mg/dL     Reviewed with patient. Discussed consider low-dose ASA and statin for primary prevention. Patient reports he would like to avoid starting any additional medications at this time. He states he has recently signed up for the gym and a . He plans to start working on diet and exercise.       Dysfunction of both eustachian tubes 11/21/2022     Chronic.  Recurrent.  Patient has been taking a lot of flights over the past few months.  Patient reports that his right ear has discomfort.  Patient reports he has decreased hearing was recommended to get hearing aids.      Insulin resistance 07/28/2021 June 2024:  Doing well on Ozempic.  Patient desires to continue.  November 2022:  Patient tolerating Ozempic well.  However he has been off of it for a few weeks due to recent travel.    BMI Readings from Last 10 Encounters:   03/11/24 31.33 kg/m²   03/06/24 31.40 kg/m²   07/28/23 32.02 kg/m²   02/03/23 32.29 kg/m²   01/04/23 32.29 kg/m²   11/21/22 31.87 kg/m²   09/12/22 31.99 kg/m²   07/28/22 32.24 kg/m²   07/28/21 32.33 kg/m²   07/26/21 32.25 kg/m²   Patient denies any history of thyroid cancer, pancreatitis, MEN syndrome.   Management Status Statin: Not takingACE/ARB:  "Not taking  Diabetes Management StatusStatin: Not takingACE/ARB: Not taking    Screening or Prevention Patient's value Goal Complete/Controlled?   HgA1C Testing and Control   Lab Results   Component Value Date    HGBA1C 5.5 03/11/2024      Annually/Less than 8% Yes   Lipid profile : 03/11/2024 Annually Yes   LDL control Lab Results   Component Value Date    LDLCALC 146.8 03/11/2024    Annually/Less than 100 mg/dl  No   Nephropathy screening No results found for: "LABMICR"  Lab Results   Component Value Date    PROTEINUA Negative 07/28/2023     No results found for: "UTPCR"   Annually Yes   Blood pressure BP Readings from Last 1 Encounters:   06/25/24 138/82    Less than 140/90 Yes   Dilated retinal exam Most Recent Eye Exam Date: Not Found Annually No   Foot exam   Most Recent Foot Exam Date: Not Found Annually No           Hearing loss 07/28/2021     Chronic. Stable. Following with ENT and audiology. Using hearing aids bilaterally.       Rotator cuff syndrome, right 07/30/2020     X-ray shows degenerative change of AC joint and glenohumeral joint.  Patient with overuse of the right arm with extracurricular activities and at work.  Has not had any steroid injection.  Patient takes Tylenol or anti-inflammatory as needed for the pain.      Right shoulder pain 07/28/2020     X-ray Shoulder 2 or More Views Right  Narrative: EXAMINATION:  XR SHOULDER COMPLETE 2 OR MORE VIEWS RIGHT    CLINICAL HISTORY:  pain;Pain in right shoulder    TECHNIQUE:  Two or three views of the right shoulder were performed.    COMPARISON:  None    FINDINGS:  Advanced degenerative changes at the AC joint with marginal spurring including along the inferior aspect of the joint.  Mild degenerative changes at the glenohumeral articulation noted as well.  No acute fracture or dislocation.  Soft tissues appear normal.  Impression: As above    Electronically signed by: Matty Small MD  Date:    07/28/2020  Time:    11:20          Encounter for " long-term (current) use of medications 2020:  Reviewed labs.  2022: Reviewed labs.  CHRONIC. Stable. Compliant with medications for managed conditions. See medication list. No SE reported.   Routine lab analysis is being monitored. Refills were addressed.  Lab Results   Component Value Date    WBC 5.46 2024    HGB 14.9 2024    HCT 45.7 2024    MCV 96 2024     2024         Chemistry        Component Value Date/Time     2024 0900    K 4.6 2024 0900     2024 0900    CO2 25 2024 0900    BUN 18 2024 0900    CREATININE 0.9 2024 0900     2024 0900        Component Value Date/Time    CALCIUM 9.6 2024 0900    ALKPHOS 73 2024 0900    AST 20 2024 0900    ALT 20 2024 0900    BILITOT 0.5 2024 0900    ESTGFRAFRICA >60.0 2022 0828    EGFRNONAA >60.0 2022 0828          Lab Results   Component Value Date    TSH 1.787 2024           Erectile dysfunction following radical prostatectomy 2018    Prostate cancer 2018    Colon polyps 2015      Recent Labs Obtained:  No visits with results within 7 Day(s) from this visit.   Latest known visit with results is:   Patient Outreach on 2024   Component Date Value Ref Range Status    Left Eye DM Retinopathy 10/08/2024 Negative   Final    Right Eye DM Retinopathy 10/08/2024 Negative   Final       Encounter Diagnosis   Name Primary?    Acute bacterial sinusitis Yes        No orders of the defined types were placed in this encounter.     Medications Ordered This Encounter   Medications    azithromycin (Z-NICOLASA) 250 MG tablet     Sig: Take 2 tablets by mouth on day 1; Take 1 tablet by mouth on days 2-5     Dispense:  6 tablet     Refill:  0    methylPREDNISolone (MEDROL DOSEPACK) 4 mg tablet     Sig: follow package directions     Dispense:  21 tablet     Refill:  0        E-Visit Time Trackin minutes

## 2025-05-07 ENCOUNTER — PATIENT OUTREACH (OUTPATIENT)
Dept: ADMINISTRATIVE | Facility: HOSPITAL | Age: 70
End: 2025-05-07
Payer: COMMERCIAL

## 2025-05-07 ENCOUNTER — PATIENT MESSAGE (OUTPATIENT)
Dept: FAMILY MEDICINE | Facility: CLINIC | Age: 70
End: 2025-05-07
Payer: COMMERCIAL

## 2025-05-07 NOTE — PROGRESS NOTES
VBC Program closed due to Does Not Meet Criteria for Program status. Patient is not due for any topics at this time.

## 2025-05-08 ENCOUNTER — OFFICE VISIT (OUTPATIENT)
Dept: FAMILY MEDICINE | Facility: CLINIC | Age: 70
End: 2025-05-08
Payer: COMMERCIAL

## 2025-05-08 DIAGNOSIS — L90.5 SCAR OF FOREHEAD: Primary | ICD-10-CM

## 2025-05-08 DIAGNOSIS — Z12.11 COLON CANCER SCREENING: ICD-10-CM

## 2025-05-08 DIAGNOSIS — E78.2 MIXED HYPERLIPIDEMIA: ICD-10-CM

## 2025-05-08 DIAGNOSIS — Z12.5 SCREENING PSA (PROSTATE SPECIFIC ANTIGEN): ICD-10-CM

## 2025-05-08 DIAGNOSIS — E66.3 OVERWEIGHT WITH BODY MASS INDEX (BMI) OF 29 TO 29.9 IN ADULT: ICD-10-CM

## 2025-05-08 DIAGNOSIS — Z86.0100 HISTORY OF COLON POLYPS: ICD-10-CM

## 2025-05-08 DIAGNOSIS — Z79.899 ENCOUNTER FOR LONG-TERM (CURRENT) USE OF MEDICATIONS: ICD-10-CM

## 2025-05-08 DIAGNOSIS — C61 PROSTATE CANCER: Chronic | ICD-10-CM

## 2025-05-08 PROBLEM — A49.9 BACTERIAL INFECTION: Status: RESOLVED | Noted: 2024-06-25 | Resolved: 2025-05-08

## 2025-05-08 PROBLEM — R03.0 ELEVATED BLOOD-PRESSURE READING WITHOUT DIAGNOSIS OF HYPERTENSION: Status: RESOLVED | Noted: 2023-01-04 | Resolved: 2025-05-08

## 2025-05-08 PROBLEM — M79.644 PAIN OF FINGER OF RIGHT HAND: Status: RESOLVED | Noted: 2023-07-28 | Resolved: 2025-05-08

## 2025-05-08 PROBLEM — Z12.83 SCREENING FOR SKIN CANCER: Status: RESOLVED | Noted: 2023-02-03 | Resolved: 2025-05-08

## 2025-05-08 PROBLEM — M25.511 RIGHT SHOULDER PAIN: Status: RESOLVED | Noted: 2020-07-28 | Resolved: 2025-05-08

## 2025-05-08 PROBLEM — R68.89 DECREASED ACTIVITY TOLERANCE: Status: RESOLVED | Noted: 2023-07-24 | Resolved: 2025-05-08

## 2025-05-08 PROBLEM — M75.101 ROTATOR CUFF SYNDROME, RIGHT: Status: RESOLVED | Noted: 2020-07-30 | Resolved: 2025-05-08

## 2025-05-08 PROBLEM — M25.671 DECREASED RANGE OF MOTION OF BOTH ANKLES: Status: RESOLVED | Noted: 2023-07-24 | Resolved: 2025-05-08

## 2025-05-08 PROBLEM — M25.672 DECREASED RANGE OF MOTION OF BOTH ANKLES: Status: RESOLVED | Noted: 2023-07-24 | Resolved: 2025-05-08

## 2025-05-08 NOTE — ASSESSMENT & PLAN NOTE
Update psa. The natural history of prostate cancer and ongoing controversy regarding screening and potential treatment outcomes of prostate cancer has been discussed with the patient. The meaning of a false positive PSA and a false negative PSA has been discussed. He indicates understanding of the limitations of this screening test and wishes to proceed with screening PSA testing

## 2025-05-08 NOTE — ASSESSMENT & PLAN NOTE
Update labs. Counseled on hyperlipidemia disease course, healthy diet and increased need for exercise.  Please be advised of the risk of cardiovascular disease, increase stroke and heart attack risk with uncontrolled/untreated hyperlipidemia. Patient voiced understanding and understood the treatment plan. All questions were answered.

## 2025-05-08 NOTE — ASSESSMENT & PLAN NOTE
Referral to endoscopy for scheduling.    For information on Fall & Injury Prevention, visit [email or web address]

## 2025-05-08 NOTE — PROGRESS NOTES
Primary Care Telemedicine Note  The patient location is:  Patient's Home - [place]  The chief complaint leading to consultation is:   Chief Complaint   Patient presents with    Follow-up      Total time: 26 minutes see MDM below. The total time spent on the encounter, which includes face to face time and non-face to face time preparing to see the patient (eg, review of previous medical records, tests), Obtaining and/or reviewing separately obtained history, documenting clinical information in the electronic or other health record, independently interpreting results (not separately reported)/communicating results to the patient/family/caregiver, and/or care coordination (not separately reported).      Visit type: Virtual visit with synchronous audio and video    Each patient to whom he or she provides medical services by telemedicine is:  (1) informed of the relationship between the physician and patient and the respective role of any other health care provider with respect to management of the patient; and (2) notified that he or she may decline to receive medical services by telemedicine and may withdraw from such care at any time.  =================================================================  Assessment/Plan:    1. Scar of forehead  Overview:  He reports a fall while out of state on vacation where he slipped in a bathroom and fell hitting his head on corner of a sink. He suspects the area needed stiches. He went to the ER for evaluation but left after waiting for a few hours. He has tried OTC creams and scar treatments. He reports the area is mildly tender. He is interested in having this evaluated for scar treatment.      Assessment & Plan:  Referral to plastic surgery, Dr. Weiler for consultation.     Orders:  -     Cancel: Ambulatory referral/consult to Plastic Surgery; Future; Expected date: 05/15/2025  -     Ambulatory referral/consult to Plastic Surgery; Future; Expected date: 05/15/2025    2. Mixed  hyperlipidemia  Overview:  CHRONIC. STABLE. Lab analysis reviewed.   (-) CP, SOB, abdominal pain, N/V/D, constipation, jaundice, skin changes.  (-) Myalgias    Lab Results   Component Value Date    CHOL 209 (H) 03/11/2024    CHOL 226 (H) 07/28/2023    CHOL 232 (H) 07/28/2022     Lab Results   Component Value Date    HDL 39 (L) 03/11/2024    HDL 46 07/28/2023    HDL 43 07/28/2022     Lab Results   Component Value Date    LDLCALC 146.8 03/11/2024    LDLCALC 146.8 07/28/2023    LDLCALC 161.8 (H) 07/28/2022     Lab Results   Component Value Date    TRIG 116 03/11/2024    TRIG 166 (H) 07/28/2023    TRIG 136 07/28/2022     Lab Results   Component Value Date    CHOLHDL 18.7 (L) 03/11/2024    CHOLHDL 20.4 07/28/2023    CHOLHDL 18.5 (L) 07/28/2022     Lab Results   Component Value Date    TOTALCHOLEST 5.4 (H) 03/11/2024    TOTALCHOLEST 4.9 07/28/2023    TOTALCHOLEST 5.4 (H) 07/28/2022     Lab Results   Component Value Date    ALT 20 03/11/2024    AST 20 03/11/2024    ALKPHOS 73 03/11/2024    BILITOT 0.5 03/11/2024     ======================================================  The 10-year ASCVD risk score (Jesus CHILDERS, et al., 2019) is: 31.5%    Values used to calculate the score:      Age: 69 years      Sex: Male      Is Non- : No      Diabetic: No      Tobacco smoker: No      Systolic Blood Pressure: 170 mmHg      Is BP treated: No      HDL Cholesterol: 39 mg/dL      Total Cholesterol: 209 mg/dL        Assessment & Plan:  Update labs. Counseled on hyperlipidemia disease course, healthy diet and increased need for exercise.  Please be advised of the risk of cardiovascular disease, increase stroke and heart attack risk with uncontrolled/untreated hyperlipidemia. Patient voiced understanding and understood the treatment plan. All questions were answered.     Orders:  -     Lipid Panel; Future; Expected date: 05/08/2025    3. Overweight with body mass index (BMI) of 29 to 29.9 in adult  Overview:  BMI  Readings from Last 10 Encounters:   09/17/24 30.12 kg/m²   06/25/24 29.29 kg/m²   03/11/24 31.33 kg/m²   03/06/24 31.40 kg/m²   07/28/23 32.02 kg/m²   02/03/23 32.29 kg/m²   01/04/23 32.29 kg/m²   11/21/22 31.87 kg/m²   09/12/22 31.99 kg/m²   07/28/22 32.24 kg/m²     He stopped ozempic approx 4 months ago due to severe constipation.     Assessment & Plan:  Encourage increased physical activity, healthy diet choices, and weight loss for prevention of progression of comorbid conditions.       4. Prostate cancer  Assessment & Plan:  Saw urology in the past. History of prostatectomy 2008. Follow up with urology.       Orders:  -     Prostate Specific Antigen, Diagnostic; Future; Expected date: 05/08/2025    5. Screening PSA (prostate specific antigen)  Assessment & Plan:  Update psa. The natural history of prostate cancer and ongoing controversy regarding screening and potential treatment outcomes of prostate cancer has been discussed with the patient. The meaning of a false positive PSA and a false negative PSA has been discussed. He indicates understanding of the limitations of this screening test and wishes to proceed with screening PSA testing      6. History of colon polyps  Overview:  Impression:           - One 1 mm polyp in the descending colon, removed                         with a cold biopsy forceps. Resected and retrieved.                         - Erythematous mucosa in the rectum and in the                         sigmoid colon. Biopsied.                         - Diverticulosis in the sigmoid colon.                         - The anus, descending colon, transverse colon,                         ascending colon, cecum, appendiceal orifice,                         ileocecal valve and terminal ileum are normal.                         - One 1 mm polyp in the descending colon, removed                         with a cold biopsy forceps. Resected and retrieved.   Recommendation:       - Patient has a contact  number available for                         emergencies. The signs and symptoms of potential                         delayed complications were discussed with the                         patient. Return to normal activities tomorrow.                         Written discharge instructions were provided to the                         patient.                         - Resume previous diet.                         - Continue present medications.                         - No aspirin, ibuprofen, naproxen, or other                         non-steroidal anti-inflammatory drugs.                         - Return to my office in 2 weeks.                         - Repeat colonoscopy in 5 years for surveillance.                         - Discharge patient to home (via wheelchair).   Rod Rayo MD   6/25/2020 1:59:57 PM     Assessment & Plan:  Colonoscopy due soon. Endoscopy referral placed.       7. Colon cancer screening  Assessment & Plan:  Referral to endoscopy for scheduling.     Orders:  -     Ambulatory referral/consult to Endo Procedure ; Future; Expected date: 06/25/2025    8. Encounter for long-term (current) use of medications  Overview:  CHRONIC. Stable. Compliant with medications for managed conditions. See medication list. No SE reported.   Routine lab analysis is being monitored. Refills were addressed. Reviewed labs.    Lab Results   Component Value Date    WBC 5.46 03/11/2024    HGB 14.9 03/11/2024    HCT 45.7 03/11/2024    MCV 96 03/11/2024     03/11/2024         Chemistry        Component Value Date/Time     03/11/2024 0900    K 4.6 03/11/2024 0900     03/11/2024 0900    CO2 25 03/11/2024 0900    BUN 18 03/11/2024 0900    CREATININE 0.9 03/11/2024 0900     03/11/2024 0900        Component Value Date/Time    CALCIUM 9.6 03/11/2024 0900    ALKPHOS 73 03/11/2024 0900    AST 20 03/11/2024 0900    ALT 20 03/11/2024 0900    BILITOT 0.5 03/11/2024 0900    ESTGFRAFRICA >60.0  07/28/2022 0828    EGFRNONAA >60.0 07/28/2022 0828          Lab Results   Component Value Date    TSH 1.787 03/11/2024         Assessment & Plan:  Update labs. Complete history and physical was completed today.  Complete and thorough medication reconciliation was performed.  Discussed risks and benefits of medications.  Advised patient on orders and health maintenance.  We discussed old records and old labs if available.  Will request any records not available through epic.  Continue current medications listed on your summary sheet.    Orders:  -     TSH; Future; Expected date: 05/08/2025  -     Lipid Panel; Future; Expected date: 05/08/2025  -     Hemoglobin A1C; Future; Expected date: 05/08/2025  -     Comprehensive Metabolic Panel; Future; Expected date: 05/08/2025  -     CBC Without Differential; Future; Expected date: 05/08/2025      Follow up if symptoms worsen or fail to improve.  ER precautions for severe or worsening symptoms.     Hannah Anguiano NP  _____________________________________________________________________________________________________________________________________________________    CC: follow up     HPI: Patient is a 69-year-old male who presents in clinic today as an established patient here for follow up. He completed an E-visit a couple days ago for some lingering sinus symptoms. He was started on steroids and antibiotics. He reports significant improvement in symptoms. There is no fever, chills, chest pain, SOB. He reports congestion, runny nose, cough, headache. He is taking medications as prescribed.     He is due for colonoscopy soon and would like to schedule.     He is due for labs and would like to schedule.     He reports a fall while out of state on vacation where he slipped in a bathroom and fell hitting his head on corner of a sink. He suspects the area needed stiches. He went to the ER for evaluation but left after waiting for a few hours. He has tried OTC creams and scar  treatments. He reports the area is mildly tender. He is interested in having this evaluated for scar treatment.     Other chronic conditions have been reviewed and remains stable. Further details as stated above.     Past Medical History:  Past Medical History:   Diagnosis Date    Allergy     Basal cell carcinoma     Cancer     Prostate    Colon polyp     Erosive gastritis 2020    Insulin resistance 2021     Past Surgical History:   Procedure Laterality Date    ADENOIDECTOMY  1966    Tonsils and adenoids removed    COLONOSCOPY N/A 2015    Procedure: COLONOSCOPY;  Surgeon: Rod Rayo MD;  Location: [facility];  Service: Endoscopy;  Laterality: N/A;    COLONOSCOPY N/A 2020    Procedure: COLONOSCOPY;  Surgeon: Rod Rayo MD;  Location: [facility];  Service: Endoscopy;  Laterality: N/A;    ESOPHAGOGASTRODUODENOSCOPY N/A 2020    Procedure: ESOPHAGOGASTRODUODENOSCOPY (EGD);  Surgeon: Rod Rayo MD;  Location: [facility];  Service: Endoscopy;  Laterality: N/A;    PROSTATE SURGERY      radical prostatectomy    TONSILLECTOMY      Child    VASECTOMY       Review of patient's allergies indicates:   Allergen Reactions    Penicillins      Other reaction(s): Swelling    Sulfa (sulfonamide antibiotics)      Other reaction(s): Rash    Bactrim  [sulfamethoxazole-trimethoprim] Rash     Social History[1]  Family History   Problem Relation Name Age of Onset    Cancer Mother Flora         Ovarian Cancer-     Medications Ordered Prior to Encounter[2]    Review of Systems   Constitutional:  Negative for appetite change, chills, fatigue and fever.   HENT:  Positive for congestion and sore throat. Negative for drooling, ear discharge, ear pain, rhinorrhea and trouble swallowing.    Eyes:  Negative for visual disturbance.   Respiratory:  Positive for cough. Negative for shortness of breath and stridor.    Cardiovascular:  Negative for chest pain, palpitations and leg swelling.    Gastrointestinal:  Negative for abdominal pain, diarrhea and vomiting.   Genitourinary:  Negative for difficulty urinating, dysuria and hematuria.   Musculoskeletal:  Negative for arthralgias, myalgias and neck pain.   Skin:  Negative for rash and wound.   Neurological:  Positive for headaches. Negative for dizziness.   Psychiatric/Behavioral:  Negative for behavioral problems. The patient is not nervous/anxious.      There were no vitals filed for this visit.    Wt Readings from Last 3 Encounters:   09/17/24 100.7 kg (222 lb 1.6 oz)   06/25/24 98 kg (216 lb)   03/11/24 104.8 kg (231 lb)     Physical Exam  Vitals reviewed.   Constitutional:       General: He is awake. He is not in acute distress.     Appearance: Normal appearance. He is not ill-appearing.   HENT:      Head: Normocephalic and atraumatic.        Comments: +healed scar to right side of forehead      Right Ear: External ear normal.      Left Ear: External ear normal.      Nose: Congestion present.   Eyes:      Extraocular Movements: Extraocular movements intact.      Conjunctiva/sclera: Conjunctivae normal.   Pulmonary:      Effort: Pulmonary effort is normal. No respiratory distress.   Musculoskeletal:      Cervical back: Normal range of motion.   Skin:     General: Skin is warm and dry.      Coloration: Skin is not pale.      Findings: No rash.   Neurological:      General: No focal deficit present.      Mental Status: He is alert and oriented to person, place, and time. Mental status is at baseline.      GCS: GCS eye subscore is 4. GCS verbal subscore is 5. GCS motor subscore is 6.   Psychiatric:         Mood and Affect: Mood normal.         Speech: Speech normal. Speech is not rapid and pressured, delayed or slurred.         Behavior: Behavior normal. Behavior is not agitated, slowed, aggressive, withdrawn or hyperactive. Behavior is cooperative.         Thought Content: Thought content normal.         Judgment: Judgment normal.       Health  Maintenance   Topic Date Due    High Dose Statin  Never done    RSV Vaccine (Age 60+ and Pregnant patients) (1 - Risk 60-74 years 1-dose series) Never done    COVID-19 Vaccine ( season) 2024    Lipid Panel  2025    Colorectal Cancer Screening  2025    Diabetic Eye Exam  10/08/2025    TETANUS VACCINE  2025    Hemoglobin A1c (Diabetic Prevention Screening)  2027    Hepatitis C Screening  Completed    Shingles Vaccine  Completed    Influenza Vaccine  Completed    Pneumococcal Vaccines (Age 50+)  Completed    Abdominal Aortic Aneurysm Screening  Completed     This note was generated with the assistance of ambient listening technology. Verbal consent was obtained by the patient and accompanying visitor(s) for the recording of patient appointment to facilitate this note. I attest to having reviewed and edited the generated note for accuracy, though some syntax or spelling errors may persist. Please contact the author of this note for any clarification.    Visit today included increased complexity associated with the care of the episodic problem - see above- addressed and managing the longitudinal care of the patient due to the serious and/or complex managed problem(s) - see above.         [1]   Social History  Tobacco Use    Smoking status: Former     Current packs/day: 0.00     Average packs/day: 0.3 packs/day for 5.0 years (1.3 ttl pk-yrs)     Types: Cigarettes, Cigars     Start date: 1977     Quit date: 1982     Years since quittin.4    Smokeless tobacco: Never    Tobacco comments:     Still crave one at times.  Makes me sick for days when I have one   Substance Use Topics    Alcohol use: Yes     Alcohol/week: 6.0 standard drinks of alcohol     Types: 6 Drinks containing 0.5 oz of alcohol per week     Comment: one a day    Drug use: No   [2]   Current Outpatient Medications on File Prior to Visit   Medication Sig Dispense Refill    azithromycin (Z-NICOLASA) 250 MG  tablet Take 2 tablets by mouth on day 1; Take 1 tablet by mouth on days 2-5 6 tablet 0    hydrOXYzine HCL (ATARAX) 25 MG tablet Take 1/2 to 1 tablet for itching as needed. **MAY CAUSE DROWSINESS** 60 tablet 11    ketoconazole (NIZORAL) 2 % shampoo       methylPREDNISolone (MEDROL DOSEPACK) 4 mg tablet follow package directions 21 tablet 0    montelukast (SINGULAIR) 10 mg tablet TAKE 1 TABLET BY MOUTH EVERY EVENING 90 tablet 3    promethazine-dextromethorphan (PROMETHAZINE-DM) 6.25-15 mg/5 mL Syrp Take 5 mLs by mouth 3 (three) times daily as needed for Cough (Medication may cause drowsiness.) for up to 7 days 100 mL 0     No current facility-administered medications on file prior to visit.

## 2025-05-09 ENCOUNTER — LAB VISIT (OUTPATIENT)
Dept: LAB | Facility: HOSPITAL | Age: 70
End: 2025-05-09
Attending: NURSE PRACTITIONER
Payer: COMMERCIAL

## 2025-05-09 ENCOUNTER — RESULTS FOLLOW-UP (OUTPATIENT)
Dept: FAMILY MEDICINE | Facility: CLINIC | Age: 70
End: 2025-05-09

## 2025-05-09 DIAGNOSIS — Z79.899 ENCOUNTER FOR LONG-TERM (CURRENT) USE OF MEDICATIONS: ICD-10-CM

## 2025-05-09 DIAGNOSIS — E87.5 HYPERKALEMIA: Primary | ICD-10-CM

## 2025-05-09 DIAGNOSIS — C61 PROSTATE CANCER: Chronic | ICD-10-CM

## 2025-05-09 DIAGNOSIS — E78.2 MIXED HYPERLIPIDEMIA: ICD-10-CM

## 2025-05-09 LAB
ALBUMIN SERPL BCP-MCNC: 4 G/DL (ref 3.5–5.2)
ALP SERPL-CCNC: 84 UNIT/L (ref 40–150)
ALT SERPL W/O P-5'-P-CCNC: 27 UNIT/L (ref 10–44)
ANION GAP (OHS): 7 MMOL/L (ref 8–16)
AST SERPL-CCNC: 23 UNIT/L (ref 11–45)
BILIRUB SERPL-MCNC: 0.3 MG/DL (ref 0.1–1)
BUN SERPL-MCNC: 20 MG/DL (ref 8–23)
CALCIUM SERPL-MCNC: 9.3 MG/DL (ref 8.7–10.5)
CHLORIDE SERPL-SCNC: 108 MMOL/L (ref 95–110)
CHOLEST SERPL-MCNC: 183 MG/DL (ref 120–199)
CHOLEST/HDLC SERPL: 3.5 {RATIO} (ref 2–5)
CO2 SERPL-SCNC: 28 MMOL/L (ref 23–29)
CREAT SERPL-MCNC: 0.9 MG/DL (ref 0.5–1.4)
EAG (OHS): 114 MG/DL (ref 68–131)
ERYTHROCYTE [DISTWIDTH] IN BLOOD BY AUTOMATED COUNT: 13.9 % (ref 11.5–14.5)
GFR SERPLBLD CREATININE-BSD FMLA CKD-EPI: >60 ML/MIN/1.73/M2
GLUCOSE SERPL-MCNC: 119 MG/DL (ref 70–110)
HBA1C MFR BLD: 5.6 % (ref 4–5.6)
HCT VFR BLD AUTO: 44.1 % (ref 40–54)
HDLC SERPL-MCNC: 53 MG/DL (ref 40–75)
HDLC SERPL: 29 % (ref 20–50)
HGB BLD-MCNC: 13.8 GM/DL (ref 14–18)
LDLC SERPL CALC-MCNC: 119.4 MG/DL (ref 63–159)
MCH RBC QN AUTO: 30.5 PG (ref 27–31)
MCHC RBC AUTO-ENTMCNC: 31.3 G/DL (ref 32–36)
MCV RBC AUTO: 98 FL (ref 82–98)
NONHDLC SERPL-MCNC: 130 MG/DL
PLATELET # BLD AUTO: 235 K/UL (ref 150–450)
PMV BLD AUTO: 11.3 FL (ref 9.2–12.9)
POTASSIUM SERPL-SCNC: 5.2 MMOL/L (ref 3.5–5.1)
PROT SERPL-MCNC: 7.3 GM/DL (ref 6–8.4)
PSA SERPL-MCNC: 0.02 NG/ML
RBC # BLD AUTO: 4.52 M/UL (ref 4.6–6.2)
SODIUM SERPL-SCNC: 143 MMOL/L (ref 136–145)
TRIGL SERPL-MCNC: 53 MG/DL (ref 30–150)
TSH SERPL-ACNC: 1.22 UIU/ML (ref 0.4–4)
WBC # BLD AUTO: 7.59 K/UL (ref 3.9–12.7)

## 2025-05-09 PROCEDURE — 80061 LIPID PANEL: CPT

## 2025-05-09 PROCEDURE — 36415 COLL VENOUS BLD VENIPUNCTURE: CPT | Mod: PO

## 2025-05-09 PROCEDURE — 84443 ASSAY THYROID STIM HORMONE: CPT

## 2025-05-09 PROCEDURE — 83036 HEMOGLOBIN GLYCOSYLATED A1C: CPT

## 2025-05-09 PROCEDURE — 84153 ASSAY OF PSA TOTAL: CPT

## 2025-05-09 PROCEDURE — 85027 COMPLETE CBC AUTOMATED: CPT

## 2025-05-09 PROCEDURE — 80053 COMPREHEN METABOLIC PANEL: CPT

## 2025-05-21 ENCOUNTER — OFFICE VISIT (OUTPATIENT)
Dept: FAMILY MEDICINE | Facility: CLINIC | Age: 70
End: 2025-05-21
Payer: COMMERCIAL

## 2025-05-21 VITALS
WEIGHT: 217.31 LBS | SYSTOLIC BLOOD PRESSURE: 138 MMHG | HEIGHT: 72 IN | OXYGEN SATURATION: 96 % | HEART RATE: 80 BPM | DIASTOLIC BLOOD PRESSURE: 84 MMHG | BODY MASS INDEX: 29.43 KG/M2

## 2025-05-21 DIAGNOSIS — B96.89 ACUTE BACTERIAL SINUSITIS: ICD-10-CM

## 2025-05-21 DIAGNOSIS — L90.5 SCAR OF FACE: ICD-10-CM

## 2025-05-21 DIAGNOSIS — Z79.899 ENCOUNTER FOR LONG-TERM (CURRENT) USE OF MEDICATIONS: ICD-10-CM

## 2025-05-21 DIAGNOSIS — Z13.6 ENCOUNTER FOR LIPID SCREENING FOR CARDIOVASCULAR DISEASE: ICD-10-CM

## 2025-05-21 DIAGNOSIS — Z00.00 WELL ADULT EXAM: Primary | ICD-10-CM

## 2025-05-21 DIAGNOSIS — E78.2 MIXED HYPERLIPIDEMIA: ICD-10-CM

## 2025-05-21 DIAGNOSIS — Z85.46 HISTORY OF PROSTATE CANCER: ICD-10-CM

## 2025-05-21 DIAGNOSIS — H35.9 RETINA DISORDER, LEFT: ICD-10-CM

## 2025-05-21 DIAGNOSIS — J01.90 ACUTE BACTERIAL SINUSITIS: ICD-10-CM

## 2025-05-21 DIAGNOSIS — E55.9 VITAMIN D DEFICIENCY: ICD-10-CM

## 2025-05-21 DIAGNOSIS — Z13.220 ENCOUNTER FOR LIPID SCREENING FOR CARDIOVASCULAR DISEASE: ICD-10-CM

## 2025-05-21 DIAGNOSIS — E87.5 HYPERKALEMIA: ICD-10-CM

## 2025-05-21 PROBLEM — C61 PROSTATE CANCER: Chronic | Status: RESOLVED | Noted: 2018-08-20 | Resolved: 2025-05-21

## 2025-05-21 PROCEDURE — 99397 PER PM REEVAL EST PAT 65+ YR: CPT | Mod: S$GLB,,, | Performed by: FAMILY MEDICINE

## 2025-05-21 PROCEDURE — 3044F HG A1C LEVEL LT 7.0%: CPT | Mod: CPTII,S$GLB,, | Performed by: FAMILY MEDICINE

## 2025-05-21 PROCEDURE — 3075F SYST BP GE 130 - 139MM HG: CPT | Mod: CPTII,S$GLB,, | Performed by: FAMILY MEDICINE

## 2025-05-21 PROCEDURE — 1159F MED LIST DOCD IN RCRD: CPT | Mod: CPTII,S$GLB,, | Performed by: FAMILY MEDICINE

## 2025-05-21 PROCEDURE — 99999 PR PBB SHADOW E&M-EST. PATIENT-LVL IV: CPT | Mod: PBBFAC,,, | Performed by: FAMILY MEDICINE

## 2025-05-21 PROCEDURE — 3079F DIAST BP 80-89 MM HG: CPT | Mod: CPTII,S$GLB,, | Performed by: FAMILY MEDICINE

## 2025-05-21 PROCEDURE — 3288F FALL RISK ASSESSMENT DOCD: CPT | Mod: CPTII,S$GLB,, | Performed by: FAMILY MEDICINE

## 2025-05-21 PROCEDURE — 1125F AMNT PAIN NOTED PAIN PRSNT: CPT | Mod: CPTII,S$GLB,, | Performed by: FAMILY MEDICINE

## 2025-05-21 PROCEDURE — 1100F PTFALLS ASSESS-DOCD GE2>/YR: CPT | Mod: CPTII,S$GLB,, | Performed by: FAMILY MEDICINE

## 2025-05-21 PROCEDURE — 3008F BODY MASS INDEX DOCD: CPT | Mod: CPTII,S$GLB,, | Performed by: FAMILY MEDICINE

## 2025-05-21 PROCEDURE — 1160F RVW MEDS BY RX/DR IN RCRD: CPT | Mod: CPTII,S$GLB,, | Performed by: FAMILY MEDICINE

## 2025-05-21 RX ORDER — CLINDAMYCIN PHOSPHATE 11.9 MG/ML
SOLUTION TOPICAL
COMMUNITY
Start: 2024-09-17

## 2025-05-21 RX ORDER — METHYLPREDNISOLONE 4 MG/1
TABLET ORAL
Qty: 21 TABLET | Refills: 0 | Status: SHIPPED | OUTPATIENT
Start: 2025-05-21

## 2025-05-21 RX ORDER — AZITHROMYCIN 250 MG/1
TABLET, FILM COATED ORAL
Qty: 6 TABLET | Refills: 0 | Status: SHIPPED | OUTPATIENT
Start: 2025-05-21 | End: 2025-05-26

## 2025-05-21 RX ORDER — TACROLIMUS 1 MG/G
OINTMENT TOPICAL
COMMUNITY
Start: 2024-09-17

## 2025-05-21 NOTE — PROGRESS NOTES
This note is specifically for wellness visit performed today.     WELLNESS EXAM      Patient ID: Blayne Mendoza is a 69 y.o. male.  has a past medical history of Allergy, Basal cell carcinoma, Cancer, Colon polyp, Erosive gastritis (6/25/2020), and Insulin resistance (7/28/2021).     Chief Complaint:  Encounter for wellness exam    Well Adult Physical: Patient here for a comprehensive physical exam.The patient reports chronic problems.  The patient's last visit with me was on 7/28/2023.    March 2024:  History of Present Illness  Previous history:  The patient presents for annual wellness visit. The patient needs repeat EKG. He is due for carotid ultrasound and blood work.    At some point, there was a discussion about an enlarged heart. He inquired if it was something to be discovered in his records or if it was just to do the EKG to make sure that it was not. He questioned the EKG that was ordered because the technician that did it, he has a real hairy chest and they put the probe on top of the hair and it was not connected directly to his skin. He never did the echocardiogram.    He is still on Ozempic 0.5 mg. He is not losing weight. He has an appointment at [facility] and is hiring a . He denies any side effects from the Ozempic. He used to be regular in the morning with bowel movements. He thinks he is losing muscle.   He tested positive for influenza A in 01/2024.  He had a rough course but is improving overall.  Still having lingering coughing congestion.      The patient is a 69-year-old male who presents for an annual wellness visit.    He reports overall good health, with the exception of a recent fall incident approximately 2 to 3 weeks ago. The fall occurred on a wet tile floor while he was wearing Crocs, resulting in a minor injury that has since healed. He sought medical attention at the ER but left after a 4-hour wait due to the high volume of patients. He managed the wound  himself using a butterfly bandage from his first aid kit.    He has recently undergone laboratory tests, which revealed elevated potassium levels. He discontinued Ozempic 6 months ago due to bowel issues but has since lost some weight. He has made dietary modifications, including portion control and the elimination of whiskey from his diet. He has replaced whiskey with iced tea.    He experienced a significant COVID-19 infection, during which he noticed some tissue detachment from the back of his left eye. He was referred to a retina specialist for further evaluation. He is scheduled for a follow-up appointment with the specialist in 06/2025.    He is planning a trip to [facility] in the fall and would like to have a Z-Jeffry and Medrol Dosepak on hand for travel.    SOCIAL HISTORY  He quit drinking whiskey completely and replaced it with ice tea.       Patient has seen PodiatrMireya Delgado DPM recently for plantar fasciitis.  He is currently in physical therapy for this.  Reports improvement.    July 2023: Patient doing well.  He has been off of Ozempic.  Insurance was giving him issues with getting the medication however it was actually approved until 2026 per insurance.  Patient did have issue with obtaining it from the local pharmacy but they eventually filled the medication for him.     Patient is concerned about carotid artery stenosis.  Patient had a friend who had a stroke from 90% blockage.  Patient has not had any recent screenings for cardiovascular health other than life insurance policy in the past.  He is due for updated EKG, ultrasound of the carotid and ultrasound of the abdominal aortic aneurysm screening.     Patient also having some right hand pain mainly in the 5th digit.  Patient denies any trauma or injury.    BMI Readings from Last 10 Encounters:   05/21/25 29.47 kg/m²   09/17/24 30.12 kg/m²   06/25/24 29.29 kg/m²   03/11/24 31.33 kg/m²   03/06/24 31.40 kg/m²   07/28/23 32.02 kg/m²  "  02/03/23 32.29 kg/m²   01/04/23 32.29 kg/m²   11/21/22 31.87 kg/m²   09/12/22 31.99 kg/m²           Reviewed Care team:Previous PCP: Dr. Christopher Méndez  Urology Dr. Jorgensen- prostate cancer removal       Latest Reference Range & Units 01/28/10 08:12 08/02/10 08:43 12/13/11 11:42 01/03/13 16:10 12/09/14 09:50 12/15/15 15:05 08/20/18 08:48 02/01/19 08:48 01/28/21 08:47 07/28/21 08:46   PSA, Screen 0.00 - 4.00 ng/mL 0.01 0.03 0.04 0.02 0.02 0.04    0.02   PSA Diagnostic 0.00 - 4.00 ng/mL       0.03  0.02    PSA Total 0.00 - 4.00 ng/mL        0.01 0.01    PSA, Free 0.01 - 1.50 ng/mL        0.01 0.01    PSA, Free % Not established %        100.00 100.00        July 2022:  It is been one year since our last visit.  Patient reports that he is feeling well.  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    Currently having the need for prophylactic treatment for bacterial infection.  Requesting refills.  He reports that he has been doing well on Ozempic.  He has not been taking the medication regularly however.  Weight has been stable.      Patient denies any history of thyroid cancer, pancreatitis, MEN syndrome.     Diabetes Management Status    Statin: Not taking  ACE/ARB: Not taking    Screening or Prevention Patient's value Goal Complete/Controlled?   HgA1C Testing and Control   Lab Results   Component Value Date    HGBA1C 5.6 05/09/2025      Annually/Less than 8% Yes   Lipid profile : 05/09/2025 Annually Yes   LDL control Lab Results   Component Value Date    LDLCALC 119.4 05/09/2025    Annually/Less than 100 mg/dl  No   Nephropathy screening No results found for: "LABMICR"  Lab Results   Component Value Date    PROTEINUA Negative 07/28/2023     No results found for: "UTPCR"   Annually Yes   Blood pressure BP Readings from Last 1 Encounters:   05/21/25 138/84    Less than 140/90 Yes   Dilated retinal exam : 10/08/2024 Annually No   Foot exam   Most Recent Foot Exam Date: " Not Found Annually No         Hyperlipidemia:  Patient was recently started on statin but never took the medication by Dr. WYATT.  Patient reports that he would like to try diet and exercise to reduce his cholesterol levels.      CHRONIC.  Uncontrolled LDL. Lab analysis reviewed.   (-) CP, SOB, abdominal pain, N/V/D, constipation, jaundice, skin changes.  (-) Myalgias  Lab Results   Component Value Date    CHOL 183 05/09/2025    CHOL 209 (H) 03/11/2024    CHOL 226 (H) 07/28/2023     Lab Results   Component Value Date    HDL 53 05/09/2025    HDL 39 (L) 03/11/2024    HDL 46 07/28/2023     Lab Results   Component Value Date    LDLCALC 119.4 05/09/2025    LDLCALC 146.8 03/11/2024    LDLCALC 146.8 07/28/2023     Lab Results   Component Value Date    TRIG 53 05/09/2025    TRIG 116 03/11/2024    TRIG 166 (H) 07/28/2023     Lab Results   Component Value Date    CHOLHDL 29.0 05/09/2025    CHOLHDL 18.7 (L) 03/11/2024    CHOLHDL 20.4 07/28/2023     Lab Results   Component Value Date    TOTALCHOLEST 3.5 05/09/2025    TOTALCHOLEST 5.4 (H) 03/11/2024    TOTALCHOLEST 4.9 07/28/2023     Lab Results   Component Value Date    ALT 27 05/09/2025    AST 23 05/09/2025    ALKPHOS 84 05/09/2025    BILITOT 0.3 05/09/2025     ======================================================  The 10-year ASCVD risk score (Jesus CHILDERS, et al., 2019) is: 19%    Values used to calculate the score:      Age: 69 years      Sex: Male      Is Non- : No      Diabetic: No      Tobacco smoker: No      Systolic Blood Pressure: 138 mmHg      Is BP treated: No      HDL Cholesterol: 53 mg/dL      Total Cholesterol: 183 mg/dL      Do you take any herbs or supplements that were not prescribed by a doctor? no   Are you taking calcium supplements? no      History:  History of prostate cancer detected by physical by Dr. Méndez and removed  by radical prostatectomy in 2008 per record I do not have a copy of the op report..  PSA has been very  low.  UROLOGIST: Kal Sprague MD  Date last PSA:  See diagnostic PSA above.  Lab Results   Component Value Date    PSA 0.02 05/09/2025    PSA 0.02 07/28/2021    PSA 0.04 12/15/2015      Colon cancer screening:Findings:        The perianal and digital rectal examinations were normal.        A 1 mm polyp was found in the descending colon. The polyp was        sessile. The polyp was removed with a cold biopsy forceps. Resection        and retrieval were complete. Estimated blood loss was minimal.        A diffuse area of moderately erythematous mucosa was found in the        rectum and in the sigmoid colon. Biopsies were taken with a cold        forceps for histology. Estimated blood loss was minimal. this is        suspicious for ischemic colitis.        A few small-mouthed diverticula were found in the sigmoid colon.        The anus, descending colon, transverse colon, ascending colon,        cecum, appendiceal orifice, ileocecal valve and ileum appeared        normal.        A 1 mm polyp was found in the descending colon. The polyp was        sessile. The polyp was removed with a cold biopsy forceps. Resection        and retrieval were complete. Estimated blood loss was minimal.   Impression:           - One 1 mm polyp in the descending colon, removed                         with a cold biopsy forceps. Resected and retrieved.                         - Erythematous mucosa in the rectum and in the                         sigmoid colon. Biopsied.                         - Diverticulosis in the sigmoid colon.                         - The anus, descending colon, transverse colon,                         ascending colon, cecum, appendiceal orifice,                         ileocecal valve and terminal ileum are normal.                         - One 1 mm polyp in the descending colon, removed                         with a cold biopsy forceps. Resected and retrieved.   Recommendation:       - Patient has a contact  number available for                         emergencies. The signs and symptoms of potential                         delayed complications were discussed with the                         patient. Return to normal activities tomorrow.                         Written discharge instructions were provided to the                         patient.                         - Resume previous diet.                         - Continue present medications.                         - No aspirin, ibuprofen, naproxen, or other                         non-steroidal anti-inflammatory drugs.                         - Return to my office in 2 weeks.                         - Repeat colonoscopy in 5 years for surveillance.                         - Discharge patient to home (via wheelchair).   Rod Rayo MD   6/25/2020 1:59:57 PM     Health Maintenance Topics with due status: Not Due       Topic Last Completion Date    TETANUS VACCINE 11/25/2015    Diabetic Eye Exam 10/08/2024    Hemoglobin A1c (Diabetic Prevention Screening) 05/09/2025    Lipid Panel 05/09/2025        ==============================================  History reviewed.     Health Maintenance Due   Topic Date Due    High Dose Statin  Never done    Colorectal Cancer Screening  06/25/2025       Past Medical History:  Past Medical History:   Diagnosis Date    Allergy     Basal cell carcinoma     Cancer     Prostate    Colon polyp     Erosive gastritis 6/25/2020    Insulin resistance 7/28/2021     Past Surgical History:   Procedure Laterality Date    ADENOIDECTOMY  1966    Tonsils and adenoids removed    COLONOSCOPY N/A 12/1/2015    Procedure: COLONOSCOPY;  Surgeon: Rod Rayo MD;  Location: [facility];  Service: Endoscopy;  Laterality: N/A;    COLONOSCOPY N/A 6/25/2020    Procedure: COLONOSCOPY;  Surgeon: Rod Rayo MD;  Location: [facility];  Service: Endoscopy;  Laterality: N/A;    ESOPHAGOGASTRODUODENOSCOPY N/A 6/25/2020    Procedure:  ESOPHAGOGASTRODUODENOSCOPY (EGD);  Surgeon: Rod Rayo MD;  Location[facility];  Service: Endoscopy;  Laterality: N/A;    PROSTATE SURGERY  2008    radical prostatectomy    TONSILLECTOMY      Child    VASECTOMY       Review of patient's allergies indicates:   Allergen Reactions    Penicillins      Other reaction(s): Swelling    Sulfa (sulfonamide antibiotics)      Other reaction(s): Rash    Bactrim  [sulfamethoxazole-trimethoprim] Rash     Current Outpatient Medications on File Prior to Visit   Medication Sig Dispense Refill    clindamycin (CLEOCIN T) 1 % external solution Apply 2-3 times per day for folliculitis.      hydrOXYzine HCL (ATARAX) 25 MG tablet Take 1/2 to 1 tablet for itching as needed. **MAY CAUSE DROWSINESS** 60 tablet 11    ketoconazole (NIZORAL) 2 % shampoo       montelukast (SINGULAIR) 10 mg tablet TAKE 1 TABLET BY MOUTH EVERY EVENING 90 tablet 3    tacrolimus (PROTOPIC) 0.1 % ointment Apply twice a day for itching in groin as needed.      [DISCONTINUED] azithromycin (Z-NICOLASA) 250 MG tablet Take 2 tablets by mouth on day 1; Take 1 tablet by mouth on days 2-5 6 tablet 0    [DISCONTINUED] methylPREDNISolone (MEDROL DOSEPACK) 4 mg tablet follow package directions (Patient not taking: Reported on 2025) 21 tablet 0    [DISCONTINUED] promethazine-dextromethorphan (PROMETHAZINE-DM) 6.25-15 mg/5 mL Syrp Take 5 mLs by mouth 3 (three) times daily as needed for Cough (Medication may cause drowsiness.) for up to 7 days (Patient not taking: Reported on 2025) 100 mL 0     No current facility-administered medications on file prior to visit.     Social History     Socioeconomic History    Marital status:    Tobacco Use    Smoking status: Former     Current packs/day: 0.00     Average packs/day: 0.3 packs/day for 5.0 years (1.3 ttl pk-yrs)     Types: Cigarettes, Cigars     Start date: 1977     Quit date: 1982     Years since quittin.5    Smokeless tobacco: Never    Tobacco  comments:     Still crave one at times.  Makes me sick for days when I have one   Substance and Sexual Activity    Alcohol use: Yes     Alcohol/week: 6.0 standard drinks of alcohol     Types: 6 Drinks containing 0.5 oz of alcohol per week     Comment: one a day    Drug use: No    Sexual activity: Not Currently     Partners: Female     Comment: My prostate removal has cause nerve damage-ED-must fix!     Social Drivers of Health     Financial Resource Strain: Low Risk  (2025)    Overall Financial Resource Strain (CARDIA)     Difficulty of Paying Living Expenses: Not hard at all   Food Insecurity: No Food Insecurity (2025)    Hunger Vital Sign     Worried About Running Out of Food in the Last Year: Never true     Ran Out of Food in the Last Year: Never true   Transportation Needs: No Transportation Needs (2025)    PRAPARE - Transportation     Lack of Transportation (Medical): No     Lack of Transportation (Non-Medical): No   Physical Activity: Sufficiently Active (2025)    Exercise Vital Sign     Days of Exercise per Week: 3 days     Minutes of Exercise per Session: 60 min   Stress: Stress Concern Present (2025)    [employer] - Occupational Stress Questionnaire     Feeling of Stress : To some extent   Housing Stability: Low Risk  (2025)    Housing Stability Vital Sign     Unable to Pay for Housing in the Last Year: No     Number of Times Moved in the Last Year: 0     Homeless in the Last Year: No     Family History   Problem Relation Name Age of Onset    Cancer Mother Flora         Ovarian Cancer-       Vitals:    25 0931   BP: 138/84   Pulse:       Body mass index is 29.47 kg/m².     Review of Systems   Constitutional:  Negative for activity change and unexpected weight change.   HENT:  Positive for hearing loss. Negative for rhinorrhea and trouble swallowing.    Eyes:  Negative for discharge and visual disturbance.   Respiratory:  Negative for chest  tightness and wheezing.    Cardiovascular:  Negative for chest pain and palpitations.   Gastrointestinal:  Negative for blood in stool, constipation, diarrhea and vomiting.   Endocrine: Negative for polydipsia and polyuria.   Genitourinary:  Negative for difficulty urinating, hematuria and urgency.   Musculoskeletal:  Positive for arthralgias. Negative for joint swelling and neck pain.   Neurological:  Negative for weakness and headaches.   Psychiatric/Behavioral:  Negative for confusion and dysphoric mood.           Objective:      Physical Exam  Vitals and nursing note reviewed.   Constitutional:       General: He is not in acute distress.     Appearance: He is well-developed. He is not diaphoretic.   HENT:      Head: Normocephalic and atraumatic.      Right Ear: External ear normal.      Left Ear: External ear normal.      Nose: Nose normal. No rhinorrhea.   Eyes:      Extraocular Movements: Extraocular movements intact.      Pupils: Pupils are equal, round, and reactive to light.   Neck:      Vascular: No carotid bruit.   Cardiovascular:      Rate and Rhythm: Normal rate and regular rhythm.      Pulses: Normal pulses.      Heart sounds: Normal heart sounds. No murmur heard.  Pulmonary:      Effort: Pulmonary effort is normal. No respiratory distress.      Breath sounds: Normal breath sounds. No wheezing.   Abdominal:      General: Bowel sounds are normal.      Palpations: Abdomen is soft.   Musculoskeletal:         General: Normal range of motion.      Cervical back: Normal range of motion and neck supple.   Lymphadenopathy:      Cervical: No cervical adenopathy.   Skin:     General: Skin is warm and dry.      Capillary Refill: Capillary refill takes less than 2 seconds.      Findings: No rash.   Neurological:      General: No focal deficit present.      Mental Status: He is alert and oriented to person, place, and time. Mental status is at baseline.      Cranial Nerves: No cranial nerve deficit.      Motor: No  weakness.      Gait: Gait normal.   Psychiatric:         Attention and Perception: He is attentive.         Mood and Affect: Mood normal. Mood is not anxious or depressed. Affect is not labile, blunt, angry or inappropriate.         Speech: He is communicative. Speech is not rapid and pressured, delayed, slurred or tangential.         Behavior: Behavior normal. Behavior is not agitated, slowed, aggressive, withdrawn, hyperactive or combative.         Thought Content: Thought content normal. Thought content is not paranoid or delusional. Thought content does not include homicidal or suicidal ideation. Thought content does not include homicidal or suicidal plan.         Cognition and Memory: Memory is not impaired.         Judgment: Judgment normal. Judgment is not impulsive or inappropriate.     Scar noted on the forehead clean dry intact  BMI Readings from Last 10 Encounters:   05/21/25 29.47 kg/m²   09/17/24 30.12 kg/m²   06/25/24 29.29 kg/m²   03/11/24 31.33 kg/m²   03/06/24 31.40 kg/m²   07/28/23 32.02 kg/m²   02/03/23 32.29 kg/m²   01/04/23 32.29 kg/m²   11/21/22 31.87 kg/m²   09/12/22 31.99 kg/m²       X-Ray Hand 3 View Right  Narrative: EXAM: XR HAND COMPLETE 3 VIEW RIGHT    CLINICAL HISTORY: Pain    COMPARISON: 07/28/2023    TECHNIQUE:  3 views of the left hand were performed.    FINDINGS: No fracture, dislocation or radiopaque foreign bodies are identified.  No significant soft tissue swelling is appreciated.  Impression:  No evidence of acute or recent injury.    Finalized on: 11/25/2024 3:03 PM By:  Bobo Canales  [facility]# [id number]      2024-11-25 15:05:59.004    [facility]  Sports Medicine US - Guidance for Needle Placement  Christopher Bhakta MD     11/25/2024 11:10 AM  Sports Medicine US - Guidance for Needle Placement    Date/Time: 11/25/2024 9:40 AM    Performed by: Christopher Bhakta MD  Authorized by: Christopher Bhakta MD  Preparation: Patient was prepped and   draped in the usual sterile fashion.  Local  anesthesia used: no    Anesthesia:  Local anesthesia used: no    Sedation:  Patient sedated: no    Patient tolerance: patient tolerated the procedure well with no immediate   complications  Comments: Ultrasound guidance was used for needle localization. Images   were saved and stored for documentation. The appropriate structures were   visualized. Dynamic visualization of the needle was continuous throughout   the procedures and maintained good position.     Assessment / Plan:      1.  Routine health exam-patient here for annual wellness exam.  Labs ordered.  Health maintenance was reviewed and ordered.    Assessment & Plan  Previous plan:  1. Abnormal EKG.  I will do an EKG today.  EKG is stable.  No bundle branch block on this EKG.  Possible LVH.  Echocardiogram pending.    2. Health maintenance.  He is due for carotid ultrasound and blood work. I will schedule an ultrasound of the abdominal aorta. I will recheck his cholesterol. I will refill his Medrol Dosepak. He will receive his influenza vaccine soon.  Proceed with vaccinations.    3. Constipation.  He will add a stool softener when he takes Ozempic.  Please be advised constipation condition course.  I recommend increase daily water intake to an acceptable level.  Increase fiber.  Recommend stool softener and laxative if needed.  Goal of 1 bowel movement daily.  Follow-up to clinic or notify me if no improvement or symptoms are persistent or worsening.  ER precautions were given.  Recommend daily exercise as tolerated, adequate water intake (six 8-oz glasses of water daily), and high fiber diet. OTC fiber supplements are recommended if diet does not reach daily fiber goal (20-30 grams daily), such as Metamucil, Citrucel, or FiberCon (take as directed, separate from other oral medications by >2 hours).  - CONTINUE OTC stool softener such as Colace as directed to avoid hard stools and straining with bowel movements PRN  -If still no improvement with these  measures, call/follow-up    Check full panel of vitamin deficiency labs including vitamin-D.  Chronic. Vit d deficiency. Takes vitamin d supplement. No SE reported. Fatigue is slightly improved.   Lab Results   Component Value Date    [id number] 17 (L) 03/11/2024      Lab Results   Component Value Date    IRON 90 03/11/2024    TRANSFERRIN 280 03/11/2024    TIBC 414 03/11/2024    FESATURATED 22 03/11/2024      Lab Results   Component Value Date    [id number] 393 03/11/2024     Lab Results   Component Value Date    FOLATE 4.2 03/11/2024     Lab Results   Component Value Date    WBC 7.59 05/09/2025    HGB 13.8 (L) 05/09/2025    HCT 44.1 05/09/2025    MCV 98 05/09/2025     05/09/2025               1. Annual wellness visit.  - Blood pressure readings were slightly elevated, likely due to recent caffeine intake.  - A recheck of blood pressure will be conducted, aiming for a reading <140/90.  - Advised to maintain current dietary habits and ensure balanced meals when at home.  - Laboratory tests will be repeated in one year.    2. Hyperkalemia.  - Potassium level was 5.2, slightly above the normal range of 5.1.  - This elevation is not considered significant.  - No immediate intervention required, potassium levels will be monitored in future labs.  - Reviewed lab results and discussed the implications of the potassium level.    3. Medication management.  - Prescriptions for Z-Jeffry and Medrol Dosepak provided for upcoming travel to [facility].  - Discussed the shelf life of medications and confirmed they will be effective for months, possibly up to a year.  - Patient generally feeling good with normal stress related to work.    4. Retinal detachment.  - History of retinal detachment in the left eye following a significant case of COVID-19.  - Seen by a retina specialist and will follow up in June.  - Record of release for [facility] will be signed to obtain medical records.  - Discussed the importance of maintaining  vision due to the patient's business needs and lack of disability income.   - risk of corticosteroids reviewed (elevated BP/glucose, insomnia, psychosis, bone loss, etc) and patient expressed understanding      Previous plan:  Right finger pain:  Check x-ray start Voltaren gel topically.  Trial of occupational therapy.  If no improvement recommend orthopedic hand consult.    Insulin resistance:  GLP 1 risk and benefits and common side effects of medication discussed with the patient at length.  All questions were answered.  Discussed with patient at length about potential pharmacologic options.  Patient desires consideration for Ozempic .  The risks, benefits and, side effects of this GLP 1 medication including nausea , constipation, diarrhea and pain injection site, etcetera.   Patient reports no personal or family history of pancreatitis, MEN or medullary thyroid cancer.  There is potential for gallbladder issues while taking this medication if not already removed.  Patient should be advised to caution with taking this medication if having history of thyroid cancer or biliary disease/gallstones.  Patient should be aware of risk of diabetic retinopathy if blood sugars lowered too quickly.  Patient is aware that weight loss can and will most likely occur quickly.  Discussed GLP 1 mechanism of action.    Hyperlipidemia:  Patient will continue lifestyle modification and increasing Ozempic dosage for weight loss.  Previously  Trial of diet and exercise.  Patient does have elevated ASCVD score however he does not want to take statin medication.  Will continue to monitor and recheck lipid panel in six months.Counseled on hyperlipidemia disease course, healthy diet and increased need for exercise.  Please be advised of the risk of cardiovascular disease, increase stroke and heart attack risk with uncontrolled/untreated hyperlipidemia.     Patient voiced understanding and understood the treatment plan. All questions were  answered.     History of prostate cancer:  Check diagnostic PSA yearly.  Insomnia/itching:  Continue hydroxyzine 25 milligrams at bedtime.Discussed insomnia condition course.  Advised of first-line medications for this condition.  Also discussed sleep hygiene.  Information was given below.  Good sleep habits (sometimes referred to as sleep hygiene) can help you get a good nights sleep.    Some habits that can improve your sleep health:  -Be consistent. Go to bed at the same time each night and get up at the same time each morning, including on the weekends  -Make sure your bedroom is quiet, dark, relaxing, and at a comfortable temperature  -Remove electronic devices, such as TVs, computers, and smart phones, from the bedroom  -Avoid large meals, caffeine, and alcohol before bedtime  -Get some exercise. Being physically active during the day can help you fall asleep more easily at night.    Weight gain/insulin resistance:  Continue Ozempic.  We will plan to monitor hemoglobin A1c at designated intervals 3 to 6 months.  I recommend ongoing Education for diabetic diet and exercise protocol.  We will continue to monitor for side effects.    Please be advised of symptoms to monitor for and to notify me immediately if persistent or worsening.  Follow up with Ophthalmology/Optometry and Podiatry at least annually.    Denies history of pancreatitis, thyroid cancer, MEN syndrome.    Hearing loss:  Follow-up with audiology.    Complete history and physical was completed today.  Complete and thorough medication reconciliation was performed.  Discussed risks and benefits of medications.  Advised patient on orders and health maintenance.  We discussed old records and old labs if available.  Will request any records not available through epic.  Continue current medications listed on your summary sheet.    All questions were answered. Patient had no further concerns. Advised of diagnoses and plan. Follow up as planned or return  sooner if symptoms persist or worsen.     Orders Placed This Encounter   Procedures    CBC Without Differential     Standing Status:   Future     Expected Date:   5/21/2025     Expiration Date:   7/20/2026    Comprehensive Metabolic Panel     Standing Status:   Future     Expected Date:   5/21/2025     Expiration Date:   7/20/2026    TSH     Standing Status:   Future     Expected Date:   5/21/2025     Expiration Date:   7/20/2026    Hemoglobin A1C     Standing Status:   Future     Expected Date:   5/21/2025     Expiration Date:   7/20/2026    Lipid Panel     Standing Status:   Future     Expected Date:   5/21/2025     Expiration Date:   7/20/2026    Vitamin D     Standing Status:   Future     Expected Date:   5/21/2025     Expiration Date:   5/21/2026     Send normal result to authorizing provider's In Basket if patient is active on MyChart::   Yes    Hemoglobin A1C     Standing Status:   Future     Expected Date:   5/21/2025     Expiration Date:   8/19/2026     Send normal result to authorizing provider's In Basket if patient is active on MyChart::   Yes    Prostate Specific Antigen, Diagnostic     Standing Status:   Future     Expected Date:   5/21/2025     Expiration Date:   7/20/2026     Send normal result to authorizing provider's In Basket if patient is active on MyChart::   Yes       Medications Ordered This Encounter   Medications    azithromycin (Z-NICOLASA) 250 MG tablet     Sig: Take 2 tablets by mouth on day 1; Take 1 tablet by mouth on days 2-5     Dispense:  6 tablet     Refill:  0    methylPREDNISolone (MEDROL DOSEPACK) 4 mg tablet     Sig: follow package directions     Dispense:  21 tablet     Refill:  0              Dustin Reyes MD

## 2025-05-21 NOTE — PATIENT INSTRUCTIONS
Follow up in about 6 months (around 11/21/2025), or if symptoms worsen or fail to improve.     Dear patient,   As a result of recent federal legislation (The Federal Cures Act), you may receive lab or pathology results from your visit in your [facility] account before your physician is able to contact you. Your physician or their representative will relay the results to you with their recommendations at their soonest availability.     If no improvement in symptoms or symptoms worsen, please be advised to call MD, follow-up at clinic and/or go to ER if becomes severe.    Dustin Reyes M.D.        We Offer TELEHEALTH & Same Day Appointments!   Book your Telehealth appointment with me through my nurse or   Clinic appointments on [employer]!    [address] [place] [address]    Office: [phone]   FAX: [phone]    Check out my Facebook Page and Follow Me at: [email or web address]    Check out my website at [employer] by clicking on: [email or web address]    To Schedule appointments online, go to [employer]: [email or web address]

## 2025-06-08 NOTE — PROGRESS NOTES
Patient came in requesting more wax filters and domes. I mailed him some on 07/10/2023. Patient was given more today.     
Fall Risk

## 2025-06-16 ENCOUNTER — PATIENT MESSAGE (OUTPATIENT)
Dept: FAMILY MEDICINE | Facility: CLINIC | Age: 70
End: 2025-06-16
Payer: COMMERCIAL

## 2025-06-16 DIAGNOSIS — R05.1 ACUTE COUGH: Primary | ICD-10-CM

## 2025-06-16 RX ORDER — PROMETHAZINE HYDROCHLORIDE AND DEXTROMETHORPHAN HYDROBROMIDE 6.25; 15 MG/5ML; MG/5ML
5 SYRUP ORAL EVERY 6 HOURS PRN
Qty: 473 ML | Refills: 0 | Status: SHIPPED | OUTPATIENT
Start: 2025-06-16 | End: 2025-07-10

## 2025-06-16 NOTE — TELEPHONE ENCOUNTER
I have signed for the following orders AND/OR meds.  Please call the patient and ask the patient to schedule the testing AND/OR inform about any medications that were sent. Medications have been sent to pharmacy listed below      Medications Ordered This Encounter   Medications    promethazine-dextromethorphan (PROMETHAZINE-DM) 6.25-15 mg/5 mL Syrp     Sig: Take 5 mLs by mouth every 6 (six) hours as needed (cough).     Dispense:  473 mL     Refill:  0       [facility] - [address] [address]  Phone: [phone] Fax: [phone]

## 2025-06-18 ENCOUNTER — HOSPITAL ENCOUNTER (OUTPATIENT)
Dept: PREADMISSION TESTING | Facility: HOSPITAL | Age: 70
Discharge: HOME | End: 2025-06-18
Attending: FAMILY MEDICINE
Payer: COMMERCIAL

## 2025-06-18 DIAGNOSIS — Z12.11 COLON CANCER SCREENING: Primary | ICD-10-CM

## 2025-06-18 RX ORDER — SODIUM, POTASSIUM,MAG SULFATES 17.5-3.13G
1 SOLUTION, RECONSTITUTED, ORAL ORAL DAILY
Qty: 1 KIT | Refills: 0 | Status: SHIPPED | OUTPATIENT
Start: 2025-06-18 | End: 2025-06-20

## 2025-07-14 ENCOUNTER — CLINICAL SUPPORT (OUTPATIENT)
Dept: AUDIOLOGY | Facility: CLINIC | Age: 70
End: 2025-07-14
Payer: COMMERCIAL

## 2025-07-14 DIAGNOSIS — Z97.4 WEARS HEARING AID IN BOTH EARS: Primary | ICD-10-CM

## 2025-07-14 NOTE — PROGRESS NOTES
The patient came to the clinic because his left Phonak hearing aid was not working for him. I inspected the aid and determined that it needed a new . I replaced the  at no charge since his hearing aid is in warranty. I also cleaned the hearing aids and completed a listening check and they sound good. I went over how to change the Cerustop wax filters and the patient will contact us as needed.

## 2025-07-18 ENCOUNTER — OFFICE VISIT (OUTPATIENT)
Dept: ORTHOPEDICS | Facility: CLINIC | Age: 70
End: 2025-07-18
Payer: COMMERCIAL

## 2025-07-18 VITALS — BODY MASS INDEX: 28.99 KG/M2 | WEIGHT: 214 LBS | HEIGHT: 72 IN

## 2025-07-18 DIAGNOSIS — M65.30 TRIGGER FINGER, ACQUIRED: Primary | ICD-10-CM

## 2025-07-18 PROCEDURE — 99999 PR PBB SHADOW E&M-EST. PATIENT-LVL III: CPT | Mod: PBBFAC,,, | Performed by: ORTHOPAEDIC SURGERY

## 2025-07-18 RX ORDER — TRIAMCINOLONE ACETONIDE 40 MG/ML
40 INJECTION, SUSPENSION INTRA-ARTICULAR; INTRAMUSCULAR
Status: DISCONTINUED | OUTPATIENT
Start: 2025-07-18 | End: 2025-07-18 | Stop reason: HOSPADM

## 2025-07-18 RX ADMIN — TRIAMCINOLONE ACETONIDE 40 MG: 40 INJECTION, SUSPENSION INTRA-ARTICULAR; INTRAMUSCULAR at 07:07

## 2025-07-18 NOTE — PROGRESS NOTES
Subjective:     Patient ID: Blayne Mendoza is a 69 y.o. male.    Chief Complaint: Pain of the Right Hand      HPI:  The patient is a 69-year-old male with a right ring trigger finger who wishes injection today.  He was last injected 2024 with good relief until recently.  This is his 3rd injection.  He wishes to schedule a right ring trigger finger release.    Past Medical History:   Diagnosis Date    Allergy     Basal cell carcinoma     Cancer     Prostate    Colon polyp     Erosive gastritis 2020    Insulin resistance 2021     Past Surgical History:   Procedure Laterality Date    ADENOIDECTOMY  1966    Tonsils and adenoids removed    COLONOSCOPY N/A 2015    Procedure: COLONOSCOPY;  Surgeon: Rod Rayo MD;  Location: [facility] ENDO;  Service: Endoscopy;  Laterality: N/A;    COLONOSCOPY N/A 2020    Procedure: COLONOSCOPY;  Surgeon: Rod Rayo MD;  Location: [facility] ENDO;  Service: Endoscopy;  Laterality: N/A;    ESOPHAGOGASTRODUODENOSCOPY N/A 2020    Procedure: ESOPHAGOGASTRODUODENOSCOPY (EGD);  Surgeon: Rod Rayo MD;  Location: [facility] ENDO;  Service: Endoscopy;  Laterality: N/A;    PROSTATE SURGERY      radical prostatectomy    TONSILLECTOMY      Child    VASECTOMY       Family History   Problem Relation Name Age of Onset    Cancer Mother Flora         Ovarian Cancer-     Social History[1]  Medication List with Changes/Refills   Current Medications    CLINDAMYCIN (CLEOCIN T) 1 % EXTERNAL SOLUTION    Apply 2-3 times per day for folliculitis.    HYDROXYZINE HCL (ATARAX) 25 MG TABLET    Take 1/2 to 1 tablet for itching as needed. **MAY CAUSE DROWSINESS**    KETOCONAZOLE (NIZORAL) 2 % SHAMPOO        METHYLPREDNISOLONE (MEDROL DOSEPACK) 4 MG TABLET    follow package directions    MONTELUKAST (SINGULAIR) 10 MG TABLET    TAKE 1 TABLET BY MOUTH EVERY EVENING    TACROLIMUS (PROTOPIC) 0.1 % OINTMENT    Apply twice a day for itching in groin as needed.     Review of patient's  allergies indicates:   Allergen Reactions    Penicillins      Other reaction(s): Swelling    Sulfa (sulfonamide antibiotics)      Other reaction(s): Rash    Bactrim  [sulfamethoxazole-trimethoprim] Rash     Review of Systems   Constitutional: Negative for malaise/fatigue.   HENT:  Positive for hearing loss.    Eyes:  Positive for visual disturbance. Negative for double vision.   Cardiovascular:  Negative for chest pain.   Respiratory:  Negative for shortness of breath.    Endocrine: Negative for cold intolerance.   Hematologic/Lymphatic: Does not bruise/bleed easily.   Skin:  Negative for poor wound healing and suspicious lesions.   Musculoskeletal:  Negative for gout, joint pain and joint swelling.   Gastrointestinal:  Negative for nausea and vomiting.   Genitourinary:  Positive for frequency, hesitancy, incomplete emptying and nocturia. Negative for dysuria.   Neurological:  Negative for numbness, paresthesias and sensory change.   Psychiatric/Behavioral:  Negative for depression, memory loss and substance abuse. The patient is not nervous/anxious.    Allergic/Immunologic: Negative for persistent infections.       Objective:   Body mass index is 29.02 kg/m².  There were no vitals filed for this visit.             General    Constitutional: He is oriented to person, place, and time. He appears well-developed and well-nourished. No distress.   HENT:   Head: Normocephalic.   Eyes: EOM are normal.   Pulmonary/Chest: Effort normal.   Neurological: He is oriented to person, place, and time.   Psychiatric: He has a normal mood and affect.             Right Hand/Wrist Exam     Inspection   Scars: Wrist - absent Hand -  absent  Effusion: Wrist - absent Hand -  absent    Pain   Hand - The patient exhibits pain of the ring MCP.    Other     Neuorologic Exam    Median Distribution: normal  Ulnar Distribution: normal  Radial Distribution: normal    Comments:  There is active triggering right ring finger with palpable nodule  that moves with tendon excursion.          Vascular Exam       Capillary Refill  Right Hand: normal capillary refill          Relevant imaging results reviewed and interpreted by me, discussed with the patient and / or family today radiographs were not obtained today  Assessment:     Encounter Diagnosis   Name Primary?    Trigger finger, acquired Yes      Right ring finger  Plan:     The patient was injected right ring trigger finger with 0.5 cc Kenalog and 0.5 cc 2% plain lidocaine under sterile technique.  He will wait at least 3 months between injections.  He wishes to schedule a right ring trigger finger release.  He was counseled regarding the surgery.  Risk complications and alternatives were discussed including the risk of infection, anesthetic risk, injury to nerves and vessels, loss of motion, and possible need for additional surgeries were discussed.  He seems to understand and agree to that surgery.  All questions were answered.                Disclaimer: This note was prepared using a voice recognition system and is likely to have sound alike errors within the text.          [1]   Social History  Socioeconomic History    Marital status:    Tobacco Use    Smoking status: Former     Current packs/day: 0.00     Average packs/day: 0.3 packs/day for 5.0 years (1.3 ttl pk-yrs)     Types: Cigarettes, Cigars     Start date: 1977     Quit date: 1982     Years since quittin.6    Smokeless tobacco: Never    Tobacco comments:     Still crave one at times.  Makes me sick for days when I have one   Substance and Sexual Activity    Alcohol use: Yes     Alcohol/week: 6.0 standard drinks of alcohol     Types: 6 Drinks containing 0.5 oz of alcohol per week     Comment: one a day    Drug use: No    Sexual activity: Not Currently     Partners: Female     Comment: My prostate removal has cause nerve damage-ED-must fix!     Social Drivers of Health     Financial Resource Strain: Low Risk  (2025)     Overall Financial Resource Strain (CARDIA)     Difficulty of Paying Living Expenses: Not hard at all   Food Insecurity: No Food Insecurity (5/5/2025)    Hunger Vital Sign     Worried About Running Out of Food in the Last Year: Never true     Ran Out of Food in the Last Year: Never true   Transportation Needs: No Transportation Needs (5/5/2025)    PRAPARE - Transportation     Lack of Transportation (Medical): No     Lack of Transportation (Non-Medical): No   Physical Activity: Sufficiently Active (5/5/2025)    Exercise Vital Sign     Days of Exercise per Week: 3 days     Minutes of Exercise per Session: 60 min   Stress: Stress Concern Present (5/5/2025)    [employer] [place] [employer] - Occupational Stress Questionnaire     Feeling of Stress : To some extent   Housing Stability: Low Risk  (5/5/2025)    Housing Stability Vital Sign     Unable to Pay for Housing in the Last Year: No     Number of Times Moved in the Last Year: 0     Homeless in the Last Year: No

## 2025-07-18 NOTE — PROCEDURES
Tendon Sheath    Date/Time: 7/18/2025 7:40 AM    Performed by: Spencer Flores MD  Authorized by: Spencer Flores MD    Consent Done?:  Yes (Verbal)  Indications:  Pain  Timeout: prior to procedure the correct patient, procedure, and site was verified    Prep: patient was prepped and draped in usual sterile fashion      Local anesthesia used?: Yes    Local anesthetic:  Lidocaine 2% without epinephrine  Anesthetic total (ml):  0.5    Location:  Ring finger  Site:  R ring flexor tendon sheath  Ultrasonic guidance for needle placement?: No    Needle size:  25 G  Approach:  Volar  Medications:  40 mg triamcinolone acetonide 40 mg/mL

## 2025-07-22 ENCOUNTER — HOSPITAL ENCOUNTER (OUTPATIENT)
Dept: ENDOSCOPY | Facility: HOSPITAL | Age: 70
Discharge: HOME | End: 2025-07-22
Attending: NURSE PRACTITIONER
Payer: COMMERCIAL

## 2025-07-22 ENCOUNTER — ANESTHESIA (OUTPATIENT)
Dept: ENDOSCOPY | Facility: HOSPITAL | Age: 70
End: 2025-07-22
Payer: COMMERCIAL

## 2025-07-22 ENCOUNTER — ANESTHESIA EVENT (OUTPATIENT)
Dept: ENDOSCOPY | Facility: HOSPITAL | Age: 70
End: 2025-07-22
Payer: COMMERCIAL

## 2025-07-22 DIAGNOSIS — K63.5 POLYP OF COLON, UNSPECIFIED PART OF COLON, UNSPECIFIED TYPE: ICD-10-CM

## 2025-07-22 DIAGNOSIS — Z12.11 COLON CANCER SCREENING: Primary | ICD-10-CM

## 2025-07-22 DIAGNOSIS — Z86.0100 HISTORY OF COLON POLYPS: ICD-10-CM

## 2025-07-22 DIAGNOSIS — K57.30 DIVERTICULOSIS OF COLON: ICD-10-CM

## 2025-07-22 PROCEDURE — 37000009 HC ANESTHESIA EA ADD 15 MINS

## 2025-07-22 PROCEDURE — 27201012 HC FORCEPS, HOT/COLD, DISP: Performed by: FAMILY MEDICINE

## 2025-07-22 PROCEDURE — 88305 TISSUE EXAM BY PATHOLOGIST: CPT | Mod: TC | Performed by: FAMILY MEDICINE

## 2025-07-22 PROCEDURE — 27201089 HC SNARE, DISP (ANY): Performed by: FAMILY MEDICINE

## 2025-07-22 PROCEDURE — 37000008 HC ANESTHESIA 1ST 15 MINUTES

## 2025-07-22 PROCEDURE — 63600175 PHARM REV CODE 636 W HCPCS: Performed by: NURSE ANESTHETIST, CERTIFIED REGISTERED

## 2025-07-22 RX ORDER — PROPOFOL 10 MG/ML
VIAL (ML) INTRAVENOUS
Status: DISCONTINUED | OUTPATIENT
Start: 2025-07-22 | End: 2025-07-22

## 2025-07-22 RX ORDER — LIDOCAINE HYDROCHLORIDE 10 MG/ML
INJECTION, SOLUTION EPIDURAL; INFILTRATION; INTRACAUDAL; PERINEURAL
Status: DISCONTINUED | OUTPATIENT
Start: 2025-07-22 | End: 2025-07-22

## 2025-07-22 RX ADMIN — PROPOFOL 30 MG: 10 INJECTION, EMULSION INTRAVENOUS at 09:07

## 2025-07-22 RX ADMIN — LIDOCAINE HYDROCHLORIDE 50 MG: 10 SOLUTION INTRAVENOUS at 09:07

## 2025-07-22 RX ADMIN — PROPOFOL 80 MG: 10 INJECTION, EMULSION INTRAVENOUS at 09:07

## 2025-07-22 NOTE — H&P
Short Stay Endoscopy History and Physical    PCP - Dustin Reyes MD    Procedure - Colonoscopy  ASA - 2  Mallampati - per anesthesia  History of Anesthesia problems - no  Family history Anesthesia problems -  no     HPI:  This is a 69 y.o. male here for evaluation of :   Active Hospital Problems    Diagnosis  POA    *Colon cancer screening [Z12.11]  Not Applicable    History of colon polyps [Z86.0100]  Not Applicable     Impression:           - One 1 mm polyp in the descending colon, removed                         with a cold biopsy forceps. Resected and retrieved.                         - Erythematous mucosa in the rectum and in the                         sigmoid colon. Biopsied.                         - Diverticulosis in the sigmoid colon.                         - The anus, descending colon, transverse colon,                         ascending colon, cecum, appendiceal orifice,                         ileocecal valve and terminal ileum are normal.                         - One 1 mm polyp in the descending colon, removed                         with a cold biopsy forceps. Resected and retrieved.   Recommendation:       - Patient has a contact number available for                         emergencies. The signs and symptoms of potential                         delayed complications were discussed with the                         patient. Return to normal activities tomorrow.                         Written discharge instructions were provided to the                         patient.                         - Resume previous diet.                         - Continue present medications.                         - No aspirin, ibuprofen, naproxen, or other                         non-steroidal anti-inflammatory drugs.                         - Return to my office in 2 weeks.                         - Repeat colonoscopy in 5 years for surveillance.                         - Discharge patient to home (via  wheelchair).   Rod Rayo MD   6/25/2020 1:59:57 PM         Resolved Hospital Problems   No resolved problems to display.         Health Maintenance         Date Due Completion Date    High Dose Statin Never done ---    Colorectal Cancer Screening 06/25/2025 6/25/2020    Override on 2/22/2011: Done    Override on 12/16/2008: Done    Override on 10/17/2005: Done    Influenza Vaccine (1) 09/01/2025 11/7/2024    TETANUS VACCINE 11/25/2025 11/25/2015    Lipid Panel 05/09/2026 5/9/2025    Diabetic Eye Exam 06/05/2026 6/5/2025    Hemoglobin A1c (Diabetic Prevention Screening) 05/09/2028 5/9/2025            Screening - Yes  History of polyps - Yes     Diarrhea - no  Anemia - no  Blood in stools - no  Abdominal pain - no  Other - no    ROS:  CONSTITUTIONAL: Denies weight change,  fatigue, fevers, chills, night sweats.  CARDIOVASCULAR: Denies chest pain, shortness of breath, orthopnea and edema.  RESPIRATORY: Denies cough, hemoptysis, dyspnea, and wheezing.  GI: See HPI.    Medical History:   Past Medical History:   Diagnosis Date    Allergy     Basal cell carcinoma     Cancer     Prostate    Colon polyp     Erosive gastritis 6/25/2020    Insulin resistance 7/28/2021       Surgical History:   Past Surgical History:   Procedure Laterality Date    ADENOIDECTOMY  1966    Tonsils and adenoids removed    COLONOSCOPY N/A 12/1/2015    Procedure: COLONOSCOPY;  Surgeon: Rod Rayo MD;  Location: [facility];  Service: Endoscopy;  Laterality: N/A;    COLONOSCOPY N/A 6/25/2020    Procedure: COLONOSCOPY;  Surgeon: Rod Rayo MD;  Location: [facility];  Service: Endoscopy;  Laterality: N/A;    ESOPHAGOGASTRODUODENOSCOPY N/A 6/25/2020    Procedure: ESOPHAGOGASTRODUODENOSCOPY (EGD);  Surgeon: Rod Rayo MD;  Location: [facility];  Service: Endoscopy;  Laterality: N/A;    PROSTATE SURGERY  2008    radical prostatectomy    TONSILLECTOMY      Child    VASECTOMY         Family History:   Family History   Problem Relation  Name Age of Onset    Cancer Mother Flora         Ovarian Cancer-       Social History:   Social History[1]    Allergies:   Review of patient's allergies indicates:   Allergen Reactions    Penicillins      Other reaction(s): Swelling    Sulfa (sulfonamide antibiotics)      Other reaction(s): Rash    Bactrim  [sulfamethoxazole-trimethoprim] Rash       Medications:   Medications Ordered Prior to Encounter[2]    Physical Exam:  Vital Signs: There were no vitals filed for this visit.  General Appearance: Well appearing in no acute distress  ENT: OP clear  Chest: CTA B  CV: RRR, no m/r/g  Abd: s/nt/nd/nabs  Ext: no edema    Labs:Reviewed    IMP:  Active Hospital Problems    Diagnosis  POA    *Colon cancer screening [Z12.11]  Not Applicable    History of colon polyps [Z86.0100]  Not Applicable     Impression:           - One 1 mm polyp in the descending colon, removed                         with a cold biopsy forceps. Resected and retrieved.                         - Erythematous mucosa in the rectum and in the                         sigmoid colon. Biopsied.                         - Diverticulosis in the sigmoid colon.                         - The anus, descending colon, transverse colon,                         ascending colon, cecum, appendiceal orifice,                         ileocecal valve and terminal ileum are normal.                         - One 1 mm polyp in the descending colon, removed                         with a cold biopsy forceps. Resected and retrieved.   Recommendation:       - Patient has a contact number available for                         emergencies. The signs and symptoms of potential                         delayed complications were discussed with the                         patient. Return to normal activities tomorrow.                         Written discharge instructions were provided to the                         patient.                         - Resume previous diet.                          - Continue present medications.                         - No aspirin, ibuprofen, naproxen, or other                         non-steroidal anti-inflammatory drugs.                         - Return to my office in 2 weeks.                         - Repeat colonoscopy in 5 years for surveillance.                         - Discharge patient to home (via wheelchair).   Rod Rayo MD   2020 1:59:57 PM         Resolved Hospital Problems   No resolved problems to display.         Plan:   I have explained the risks and benefits of colonoscopy to the patient including but not limited to bleeding, perforation, infection, and death. The patient wishes to proceed.         [1]   Social History  Tobacco Use    Smoking status: Former     Current packs/day: 0.00     Average packs/day: 0.3 packs/day for 5.0 years (1.3 ttl pk-yrs)     Types: Cigarettes, Cigars     Start date: 1977     Quit date: 1982     Years since quittin.6    Smokeless tobacco: Never    Tobacco comments:     Still crave one at times.  Makes me sick for days when I have one   Vaping Use    Vaping status: Never Used   Substance Use Topics    Alcohol use: Yes     Alcohol/week: 6.0 standard drinks of alcohol     Types: 6 Drinks containing 0.5 oz of alcohol per week     Comment: one a day    Drug use: No   [2]   Current Outpatient Medications on File Prior to Encounter   Medication Sig Dispense Refill    clindamycin (CLEOCIN T) 1 % external solution Apply 2-3 times per day for folliculitis.      hydrOXYzine HCL (ATARAX) 25 MG tablet Take 1/2 to 1 tablet for itching as needed. **MAY CAUSE DROWSINESS** 60 tablet 11    ketoconazole (NIZORAL) 2 % shampoo  (Patient not taking: Reported on 2025)      methylPREDNISolone (MEDROL DOSEPACK) 4 mg tablet follow package directions (Patient not taking: Reported on 2025) 21 tablet 0    montelukast (SINGULAIR) 10 mg tablet TAKE 1 TABLET BY MOUTH EVERY EVENING 90 tablet 3    tacrolimus  (PROTOPIC) 0.1 % ointment Apply twice a day for itching in groin as needed. (Patient not taking: Reported on 7/18/2025)       No current facility-administered medications on file prior to encounter.

## 2025-07-22 NOTE — TRANSFER OF CARE
"Anesthesia Transfer of Care Note    Patient: Blayne Mendoza    Procedure(s) Performed: * No procedures listed *    Patient location: GI    Anesthesia Type: MAC    Transport from OR: Transported from OR on room air with adequate spontaneous ventilation    Post pain: adequate analgesia    Post assessment: no apparent anesthetic complications and tolerated procedure well    Post vital signs: stable    Level of consciousness: responds to stimulation    Nausea/Vomiting: no nausea/vomiting    Complications: none    Transfer of care protocol was followed      Last vitals: Visit Vitals  BP (!) 189/91   Pulse 64   Temp 36.6 °C (97.9 °F)   Resp 18   Ht 6' 1" (1.854 m)   Wt 97.1 kg (214 lb)   SpO2 98%   BMI 28.23 kg/m²     "

## 2025-07-22 NOTE — ANESTHESIA POSTPROCEDURE EVALUATION
Anesthesia Post Evaluation    Patient: Blayne Mendoza    Procedure(s) Performed: * No procedures listed *    Final Anesthesia Type: MAC      Patient location during evaluation: GI PACU  Patient participation: Yes- Able to Participate  Level of consciousness: awake and alert and oriented  Post-procedure vital signs: reviewed and stable  Pain management: adequate  Airway patency: patent  ALEJANDRO mitigation strategies: Multimodal analgesia  PONV status at discharge: No PONV  Anesthetic complications: no      Cardiovascular status: hemodynamically stable  Respiratory status: unassisted, spontaneous ventilation and room air  Hydration status: euvolemic  Follow-up not needed.              Vitals Value Taken Time   /100 07/22/25 09:45   Temp 36.7 °C (98 °F) 07/22/25 09:45   Pulse 72 07/22/25 09:45   Resp 16 07/22/25 09:45   SpO2 98 % 07/22/25 09:45         Event Time   Out of Recovery 09:57:50         Pain/Maryellen Score: Maryellen Score: 10 (7/22/2025  9:45 AM)

## 2025-07-22 NOTE — ANESTHESIA PREPROCEDURE EVALUATION
07/22/2025  Blayne Mendoza is a 69 y.o., male.      Pre-op Assessment    I have reviewed the Patient Summary Reports.    I have reviewed the NPO Status.   I have reviewed the Medications.     Review of Systems  Anesthesia Hx:  No problems with previous Anesthesia                Social:  Non-Smoker, No Alcohol Use       Hematology/Oncology:  Hematology Normal   Oncology Normal                                   EENT/Dental:  EENT/Dental Normal           Cardiovascular:  Cardiovascular Normal                                              Pulmonary:  Pulmonary Normal                       Renal/:  Renal/ Normal                 Hepatic/GI:  Bowel Prep. PUD,                  Musculoskeletal:  Musculoskeletal Normal                Neurological:  Neurology Normal                                      Endocrine:  Endocrine Normal            Dermatological:  Skin Normal    Psych:  Psychiatric Normal                    Physical Exam  General: Well nourished, Cooperative, Alert and Oriented    Airway:  Mallampati: II   Mouth Opening: Normal  TM Distance: Normal  Tongue: Normal  Neck ROM: Normal ROM    Dental:  Intact        Anesthesia Plan  Type of Anesthesia, risks & benefits discussed:    Anesthesia Type: MAC  Intra-op Monitoring Plan: Standard ASA Monitors  Post Op Pain Control Plan: IV/PO Opioids PRN and multimodal analgesia  Informed Consent: Informed consent signed with the Patient and all parties understand the risks and agree with anesthesia plan.  All questions answered.   ASA Score: 2  Day of Surgery Review of History & Physical: H&P Update referred to the surgeon/provider.    Ready For Surgery From Anesthesia Perspective.     .

## 2025-07-22 NOTE — PLAN OF CARE
Dr. Rayo  reviewed results bedside with pt and wife. No pain, no n/v. No bleeding. VSS. BP still elevated.  Discussed with MD and pt. MD suggests pt following up with primary. Instructed pt and wife to monitor BP at home as well. Discharge folder received. Pt discharged.

## 2025-07-23 VITALS
HEART RATE: 72 BPM | BODY MASS INDEX: 28.36 KG/M2 | TEMPERATURE: 98 F | DIASTOLIC BLOOD PRESSURE: 100 MMHG | RESPIRATION RATE: 16 BRPM | WEIGHT: 214 LBS | SYSTOLIC BLOOD PRESSURE: 190 MMHG | OXYGEN SATURATION: 98 % | HEIGHT: 73 IN

## 2025-07-23 LAB
ESTROGEN SERPL-MCNC: NORMAL PG/ML
INSULIN SERPL-ACNC: NORMAL U[IU]/ML
LAB AP CLINICAL INFORMATION: NORMAL
LAB AP GROSS DESCRIPTION: NORMAL
LAB AP PERFORMING LOCATION(S): NORMAL
LAB AP REPORT FOOTNOTES: NORMAL

## 2025-08-29 ENCOUNTER — OFFICE VISIT (OUTPATIENT)
Dept: FAMILY MEDICINE | Facility: CLINIC | Age: 70
End: 2025-08-29
Payer: COMMERCIAL

## 2025-08-29 VITALS — DIASTOLIC BLOOD PRESSURE: 78 MMHG | SYSTOLIC BLOOD PRESSURE: 128 MMHG

## 2025-08-29 DIAGNOSIS — R41.840 INATTENTION: Primary | ICD-10-CM

## 2025-08-29 DIAGNOSIS — B96.89 ACUTE BACTERIAL SINUSITIS: ICD-10-CM

## 2025-08-29 DIAGNOSIS — Z79.899 ENCOUNTER FOR LONG-TERM (CURRENT) USE OF MEDICATIONS: ICD-10-CM

## 2025-08-29 DIAGNOSIS — J01.90 ACUTE BACTERIAL SINUSITIS: ICD-10-CM

## 2025-08-29 RX ORDER — DEXTROAMPHETAMINE SACCHARATE, AMPHETAMINE ASPARTATE, DEXTROAMPHETAMINE SULFATE AND AMPHETAMINE SULFATE 2.5; 2.5; 2.5; 2.5 MG/1; MG/1; MG/1; MG/1
1 TABLET ORAL DAILY
Qty: 30 TABLET | Refills: 0 | Status: SHIPPED | OUTPATIENT
Start: 2025-08-29

## 2025-08-29 RX ORDER — METHYLPREDNISOLONE 4 MG/1
TABLET ORAL
Qty: 21 TABLET | Refills: 0 | Status: SHIPPED | OUTPATIENT
Start: 2025-08-29